# Patient Record
Sex: FEMALE | Race: WHITE | NOT HISPANIC OR LATINO | Employment: OTHER | URBAN - METROPOLITAN AREA
[De-identification: names, ages, dates, MRNs, and addresses within clinical notes are randomized per-mention and may not be internally consistent; named-entity substitution may affect disease eponyms.]

---

## 2017-04-18 DIAGNOSIS — I20.1 PRINZMETAL ANGINA (H): ICD-10-CM

## 2017-04-18 LAB
ALT SERPL W P-5'-P-CCNC: <5 U/L (ref 5–30)
CHOLEST SERPL-MCNC: 184 MG/DL
HDLC SERPL-MCNC: 75 MG/DL
LDLC SERPL CALC-MCNC: 85 MG/DL
NONHDLC SERPL-MCNC: 109 MG/DL
TRIGL SERPL-MCNC: 120 MG/DL

## 2017-04-18 PROCEDURE — 84460 ALANINE AMINO (ALT) (SGPT): CPT | Performed by: NURSE PRACTITIONER

## 2017-04-18 PROCEDURE — 36415 COLL VENOUS BLD VENIPUNCTURE: CPT | Performed by: NURSE PRACTITIONER

## 2017-04-18 PROCEDURE — 80061 LIPID PANEL: CPT | Performed by: NURSE PRACTITIONER

## 2017-04-24 ENCOUNTER — OFFICE VISIT (OUTPATIENT)
Dept: CARDIOLOGY | Facility: CLINIC | Age: 82
End: 2017-04-24
Attending: NURSE PRACTITIONER
Payer: MEDICARE

## 2017-04-24 VITALS
HEIGHT: 62 IN | BODY MASS INDEX: 23.41 KG/M2 | WEIGHT: 127.2 LBS | DIASTOLIC BLOOD PRESSURE: 52 MMHG | HEART RATE: 76 BPM | SYSTOLIC BLOOD PRESSURE: 136 MMHG

## 2017-04-24 DIAGNOSIS — I10 ESSENTIAL HYPERTENSION, BENIGN: Primary | ICD-10-CM

## 2017-04-24 DIAGNOSIS — E78.2 MIXED HYPERLIPIDEMIA: ICD-10-CM

## 2017-04-24 DIAGNOSIS — I47.10 PAROXYSMAL SUPRAVENTRICULAR TACHYCARDIA (H): ICD-10-CM

## 2017-04-24 DIAGNOSIS — I20.1 PRINZMETAL ANGINA (H): ICD-10-CM

## 2017-04-24 PROCEDURE — 99213 OFFICE O/P EST LOW 20 MIN: CPT | Performed by: INTERNAL MEDICINE

## 2017-04-24 RX ORDER — LOSARTAN POTASSIUM 25 MG/1
25 TABLET ORAL DAILY
COMMUNITY
End: 2017-08-04

## 2017-04-24 RX ORDER — NITROGLYCERIN 0.4 MG/1
TABLET SUBLINGUAL
Qty: 25 TABLET | Refills: 3 | Status: SHIPPED | OUTPATIENT
Start: 2017-04-24 | End: 2017-08-04

## 2017-04-24 RX ORDER — NITROGLYCERIN 0.4 MG/1
0.4 TABLET SUBLINGUAL EVERY 5 MIN PRN
COMMUNITY
End: 2019-06-20

## 2017-04-24 RX ORDER — DILTIAZEM HCL 60 MG
60 TABLET ORAL PRN
Qty: 10 TABLET | Refills: 2 | Status: SHIPPED | OUTPATIENT
Start: 2017-04-24 | End: 2019-07-30

## 2017-04-24 RX ORDER — ESTRADIOL 0.1 MG/G
2 CREAM VAGINAL WEEKLY
COMMUNITY
End: 2018-06-18

## 2017-04-24 NOTE — MR AVS SNAPSHOT
After Visit Summary   4/24/2017    Ralph Whitten    MRN: [id number]           Patient Information     Date Of Birth          10/16/1930        Visit Information        Provider Department      4/24/2017 3:45 PM Nnamdi De Jesus MD [facility]        Today's Diagnoses     Essential hypertension, benign    -  1    Prinzmetal angina (H)        Mixed hyperlipidemia        Paroxysmal supraventricular tachycardia (H)           Follow-ups after your visit        Additional Services     Follow-Up with Cardiologist                 Future tests that were ordered for you today     Open Future Orders        Priority Expected Expires Ordered    EKG 12-lead complete w/read - Clinics (to be scheduled) Routine 4/24/2018 5/29/2018 4/24/2017    Basic metabolic panel Routine 4/24/2018 5/29/2018 4/24/2017    Lipid Profile Routine 4/24/2018 5/29/2018 4/24/2017    ALT Routine 4/24/2018 5/29/2018 4/24/2017    Follow-Up with Cardiologist Routine 4/24/2018 9/6/2018 4/24/2017            Who to contact     If you have questions or need follow up information about today's clinic visit or your schedule please contact [facility] directly at [phone].  Normal or non-critical lab and imaging results will be communicated to you by MyChart, letter or phone within 4 business days after the clinic has received the results. If you do not hear from us within 7 days, please contact the clinic through [employer] or phone. If you have a critical or abnormal lab result, we will notify you by phone as soon as possible.  Submit refill requests through [employer] or call your pharmacy and they will forward the refill request to us. Please allow 3 business days for your refill to be completed.          Additional Information About Your Visit        MyChart Information     [employer] lets you send messages to your doctor, view your test results, renew your  "prescriptions, schedule appointments and more. To sign up, go to [email or web address] . Click on \"Log in\" on the left side of the screen, which will take you to the Welcome page. Then click on \"Sign up Now\" on the right side of the page.     You will be asked to enter the access code listed below, as well as some personal information. Please follow the directions to create your username and password.     Your access code is: [email or web address]  Expires: 2017  4:26 PM     Your access code will  in 90 days. If you need help or a new code, please call your [place] clinic or [phone].        Care EveryWhere ID     This is your Care EveryWhere ID. This could be used by other organizations to access your [place] medical records  [id number]        Your Vitals Were     Pulse Height BMI (Body Mass Index)             76 1.562 m (5' 1.5\") 23.65 kg/m2          Blood Pressure from Last 3 Encounters:   17 136/52   16 120/58   16 122/60    Weight from Last 3 Encounters:   17 57.7 kg (127 lb 3.2 oz)   16 60.8 kg (134 lb)   16 59.4 kg (131 lb)              We Performed the Following     Follow-Up with Cardiologist          Today's Medication Changes          These changes are accurate as of: 17  4:26 PM.  If you have any questions, ask your nurse or doctor.               These medicines have changed or have updated prescriptions.        Dose/Directions    * nitroglycerin 0.4 MG sublingual tablet   Commonly known as:  NITROSTAT   This may have changed:  Another medication with the same name was added. Make sure you understand how and when to take each.   Changed by:  Nnamdi De Jesus MD        Dose:  0.4 mg   Place 0.4 mg under the tongue every 5 minutes as needed for chest pain For chest pain place 1 tablet under the tongue every 5 minutes for 3 doses. If symptoms persist 5 minutes after 1st dose call 911.   Refills:  0       * nitroglycerin 0.4 MG sublingual tablet "   Commonly known as:  NITROSTAT   This may have changed:  You were already taking a medication with the same name, and this prescription was added. Make sure you understand how and when to take each.   Used for:  Prinzmetal angina (H)   Changed by:  Nnamdi De Jesus MD        For chest pain place 1 tablet under the tongue every 5 minutes for 3 doses. If symptoms persist 5 minutes after 1st dose call 911.   Quantity:  25 tablet   Refills:  3       * Notice:  This list has 2 medication(s) that are the same as other medications prescribed for you. Read the directions carefully, and ask your doctor or other care provider to review them with you.         Where to get your medicines      These medications were sent to [facility] [address] [place] [address] [facility]  [address] [place] [id number]     Phone:  [phone]     diltiazem 60 MG tablet    nitroglycerin 0.4 MG sublingual tablet                Primary Care Provider Office Phone # Fax #    Ramin Garvey [phone]       [facility] [address] [facility] [address]        Thank you!     Thank you for choosing [facility] [place]  for your care. Our goal is always to provide you with excellent care. Hearing back from our patients is one way we can continue to improve our services. Please take a few minutes to complete the written survey that you may receive in the mail after your visit with us. Thank you!             Your Updated Medication List - Protect others around you: Learn how to safely use, store and throw away your medicines at www.disposemymeds.org.          This list is accurate as of: 4/24/17  4:26 PM.  Always use your most recent med list.                   Brand Name Dispense Instructions for use    aspirin 81 MG tablet      Take 81 mg by mouth daily       DAILY MULTIVITAMIN PO      Take by mouth daily       * diltiazem 240 MG 24 hr capsule       Take 240 mg by mouth daily       * diltiazem 60 MG tablet    CARDIZEM    10 tablet    Take 1 tablet (60 mg) by mouth as needed Take 1 tablet only as needed for increased palpitatons       ESTRACE VAGINAL 0.1 MG/GM cream   Generic drug:  estradiol      Place 2 g vaginally once a week       LORAZEPAM PO      Take 0.5 mg by mouth daily as needed       losartan 25 MG tablet    COZAAR     Take 25 mg by mouth daily       metFORMIN 500 MG tablet    GLUCOPHAGE     Take 500 mg by mouth 2 times daily (with meals)       * nitroglycerin 0.4 MG sublingual tablet    NITROSTAT     Place 0.4 mg under the tongue every 5 minutes as needed for chest pain For chest pain place 1 tablet under the tongue every 5 minutes for 3 doses. If symptoms persist 5 minutes after 1st dose call 911.       * nitroglycerin 0.4 MG sublingual tablet    NITROSTAT    25 tablet    For chest pain place 1 tablet under the tongue every 5 minutes for 3 doses. If symptoms persist 5 minutes after 1st dose call 911.       OMEPRAZOLE PO      Take 20 mg by mouth       pravastatin 40 MG tablet    PRAVACHOL    90 tablet    Take 1 tablet (40 mg) by mouth At Bedtime       PREMARIN 0.3 MG tablet   Generic drug:  estrogens (conjugated)      Take 0.3 mg by mouth daily       PROBIOTIC ADVANCED Caps      Take by mouth daily       sertraline 50 MG tablet    ZOLOFT     Take 50 mg by mouth daily       SYNTHROID 75 MCG tablet   Generic drug:  levothyroxine      Take 75 mcg by mouth daily       VITAMIN D3 PO      Take by mouth daily       * Notice:  This list has 4 medication(s) that are the same as other medications prescribed for you. Read the directions carefully, and ask your doctor or other care provider to review them with you.

## 2017-04-24 NOTE — PROGRESS NOTES
HPI and Plan:   See dictation    Orders Placed This Encounter   Procedures     Basic metabolic panel     Lipid Profile     ALT     Follow-Up with Cardiologist     EKG 12-lead complete w/read - Clinics (to be scheduled)     Orders Placed This Encounter   Medications     estrogens, conjugated, (PREMARIN) 0.3 MG tablet     Sig: Take 0.3 mg by mouth daily     estradiol (ESTRACE VAGINAL) 0.1 MG/GM cream     Sig: Place 2 g vaginally once a week     sertraline (ZOLOFT) 50 MG tablet     Sig: Take 50 mg by mouth daily     Probiotic Product (PROBIOTIC ADVANCED) CAPS     Sig: Take by mouth daily     nitroglycerin (NITROSTAT) 0.4 MG sublingual tablet     Sig: Place 0.4 mg under the tongue every 5 minutes as needed for chest pain For chest pain place 1 tablet under the tongue every 5 minutes for 3 doses. If symptoms persist 5 minutes after 1st dose call 911.     losartan (COZAAR) 25 MG tablet     Sig: Take 25 mg by mouth daily     nitroglycerin (NITROSTAT) 0.4 MG sublingual tablet     Sig: For chest pain place 1 tablet under the tongue every 5 minutes for 3 doses. If symptoms persist 5 minutes after 1st dose call 911.     Dispense:  25 tablet     Refill:  3     diltiazem (CARDIZEM) 60 MG tablet     Sig: Take 1 tablet (60 mg) by mouth as needed Take 1 tablet only as needed for increased palpitatons     Dispense:  10 tablet     Refill:  2     Medications Discontinued During This Encounter   Medication Reason     calcium-vitamin D 500-125 MG-UNIT TABS Medication Reconciliation Clean Up     losartan-hydrochlorothiazide (HYZAAR) 50-12.5 MG per tablet Medication Reconciliation Clean Up     NITROGLYCERIN SL Medication Reconciliation Clean Up     diltiazem (CARDIZEM) 60 MG tablet Reorder         Encounter Diagnoses   Name Primary?     Prinzmetal angina (H)      Essential hypertension, benign Yes     Mixed hyperlipidemia      Paroxysmal supraventricular tachycardia (H)        CURRENT MEDICATIONS:  Current Outpatient Prescriptions    Medication Sig Dispense Refill     estrogens, conjugated, (PREMARIN) 0.3 MG tablet Take 0.3 mg by mouth daily       estradiol (ESTRACE VAGINAL) 0.1 MG/GM cream Place 2 g vaginally once a week       sertraline (ZOLOFT) 50 MG tablet Take 50 mg by mouth daily       Probiotic Product (PROBIOTIC ADVANCED) CAPS Take by mouth daily       nitroglycerin (NITROSTAT) 0.4 MG sublingual tablet Place 0.4 mg under the tongue every 5 minutes as needed for chest pain For chest pain place 1 tablet under the tongue every 5 minutes for 3 doses. If symptoms persist 5 minutes after 1st dose call 911.       losartan (COZAAR) 25 MG tablet Take 25 mg by mouth daily       nitroglycerin (NITROSTAT) 0.4 MG sublingual tablet For chest pain place 1 tablet under the tongue every 5 minutes for 3 doses. If symptoms persist 5 minutes after 1st dose call 911. 25 tablet 3     diltiazem (CARDIZEM) 60 MG tablet Take 1 tablet (60 mg) by mouth as needed Take 1 tablet only as needed for increased palpitatons 10 tablet 2     pravastatin (PRAVACHOL) 40 MG tablet Take 1 tablet (40 mg) by mouth At Bedtime 90 tablet 3     diltiazem 240 MG 24 hr ER capsule Take 240 mg by mouth daily       Multiple Vitamin (DAILY MULTIVITAMIN PO) Take by mouth daily        OMEPRAZOLE PO Take 20 mg by mouth       levothyroxine (SYNTHROID) 75 MCG tablet Take 75 mcg by mouth daily       Cholecalciferol (VITAMIN D3 PO) Take by mouth daily       aspirin 81 MG tablet Take 81 mg by mouth daily       LORAZEPAM PO Take 0.5 mg by mouth daily as needed        metFORMIN (GLUCOPHAGE) 500 MG tablet Take 500 mg by mouth 2 times daily (with meals)       [DISCONTINUED] NITROGLYCERIN SL Place 0.4 mg under the tongue       [DISCONTINUED] diltiazem (CARDIZEM) 60 MG tablet Take 1 tablet (60 mg) by mouth as needed Take 1 tablet only as needed for increased palpitatons 10 tablet 2       ALLERGIES     Allergies   Allergen Reactions     Diovan [Valsartan] Other (See Comments) and GI Disturbance      Arthralgia     Hmg-Coa-R Inhibitors Other (See Comments)     Statin Medications give patient Myalgias---simvastatin     Sulfa Drugs Rash       PAST MEDICAL HISTORY:  Past Medical History:   Diagnosis Date     Degenerative disk disease     C 6/7     Diabetes mellitus (H)      Diabetic neuropathy (H)      Essential hypertension, benign      Generalized osteoarthrosis, unspecified site (aka ARTHRITIS)     osteoporosis and osteoarthritis     Hyperlipidemia      Hypothyroid      Mitral valve disorder     mild/mod     PAT (paroxysmal atrial tachycardia) (H)      Prinzmetal angina (H)     no cad 2000     PUD (peptic ulcer disease)      Rheumatic fever      Tricuspid regurgitation     mod     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       PAST SURGICAL HISTORY:  Past Surgical History:   Procedure Laterality Date     APPENDECTOMY       C RAD RESEC TONSIL/PILLARS       CORONARY ANGIOGRAPHY ADULT ORDER  2002    normal Coronary arteries, Vasospasms     HYSTERECTOMY      total     rotator cuff surgery      right     TONSILLECTOMY         FAMILY HISTORY:  Family History   Problem Relation Age of Onset     Coronary Artery Disease Mother      Myocardial Infarction Mother      Heart Failure Father      Unknown/Adopted Maternal Grandmother      Unknown/Adopted Maternal Grandfather      Unknown/Adopted Paternal Grandmother      Unknown/Adopted Paternal Grandfather        SOCIAL HISTORY:  Social History     Social History     Marital status:      Spouse name: N/A     Number of children: N/A     Years of education: N/A     Social History Main Topics     Smoking status: Never Smoker     Smokeless tobacco: None     Alcohol use Yes      Comment: occ. wine     Drug use: None     Sexual activity: Not Asked     Other Topics Concern     Caffeine Concern No     coffee: 3-4 cups a day     Sleep Concern No     Stress Concern No     Weight Concern No     Special Diet Yes     diabetic diet, healthy      "Exercise Yes     walking x3 a week     Seat Belt Yes     Social History Narrative       Review of Systems:  Skin:  Negative     Eyes:  Positive for glasses  ENT:  Negative    Respiratory:  Negative    Cardiovascular:    Positive for;palpitations;chest pain;dizziness;lightheadedness;edema  Gastroenterology:      Genitourinary:  not assessed    Musculoskeletal:  Positive for joint pain  Neurologic:  Negative    Psychiatric:  Negative    Heme/Lymph/Imm:  Positive for allergies  Endocrine:  Positive for thyroid disorder;diabetes    Physical Exam:  Vitals: /52  Pulse 76  Ht 1.562 m (5' 1.5\")  Wt 57.7 kg (127 lb 3.2 oz)  BMI 23.65 kg/m2    Constitutional:  cooperative, alert and oriented, well developed, well nourished, in no acute distress        Skin:  warm and dry to the touch, no apparent skin lesions or masses noted        Head:  normocephalic, no masses or lesions        Eyes:  pupils equal and round, conjunctivae and lids unremarkable, sclera white, no xanthalasma, EOMS intact, no nystagmus        ENT:  no pallor or cyanosis, dentition good        Neck:  carotid pulses are full and equal bilaterally, JVP normal, no carotid bruit, no thyromegaly        Chest:  normal breath sounds, clear to auscultation, normal A-P diameter, normal symmetry, normal respiratory excursion, no use of accessory muscles          Cardiac: regular rhythm, normal S1/S2, no S3 or S4, apical impulse not displaced, no murmurs, gallops or rubs                  Abdomen:  abdomen soft, non-tender, BS normoactive, no mass, no HSM, no bruits        Vascular: pulses full and equal, no bruits auscultated                                        Extremities and Back:  no deformities, clubbing, cyanosis, erythema observed              Neurological:  affect appropriate, oriented to time, person and place          Recent Lab Results:  LIPID RESULTS:  Lab Results   Component Value Date    CHOL 184 04/18/2017    HDL 75 04/18/2017    LDL 85 " 04/18/2017    TRIG 120 04/18/2017    CHOLHDLRATIO 3.1 01/12/2011       LIVER ENZYME RESULTS:  Lab Results   Component Value Date    AST 23 04/22/2016    ALT <5 (L) 04/18/2017       CBC RESULTS:  Lab Results   Component Value Date    WBC 7.0 03/31/2015    RBC 3.97 03/31/2015    HGB 12.1 03/31/2015    HCT 36.7 03/31/2015    MCV 92 03/31/2015    MCH 30.5 03/31/2015    MCHC 33 03/31/2015    RDW 13.2 03/31/2015     03/31/2015       BMP RESULTS:  Lab Results   Component Value Date     03/31/2015    POTASSIUM 4.6 03/31/2015    CHLORIDE 105 03/31/2015    CO2 24 01/12/2011    ANIONGAP 11 03/31/2015     (A) 03/31/2015    BUN 23 03/31/2015    CR 1.13 03/31/2015    GFRESTBLACK 56 03/31/2015    CHUN 9.9 03/31/2015        A1C RESULTS:  Lab Results   Component Value Date    A1C 6.9 (A) 04/22/2016       INR RESULTS:  No results found for: INR        CC  Gwyn Camara, APRN CNP   [facility]  [address]  [place] [id number]

## 2017-04-24 NOTE — LETTER
4/24/2017    Ramin Garvey  [facility]   [address]  [facility] [address]    RE: Ralph Whitten       Dear Colleague,    I had the pleasure of seeing Ralph Whitten in the [facility].    I had the pleasure of following up on our mutual patient, Ralph Whitten.  She is a delightful 86-year-old woman who looks and acts much younger than stated age.  She has heart catheterization confirmed Printzmetal's angina.  She also has a brief history of PAT.  She reports that the PAT is not a problem but she has increased isolated flip-flops since she started sertraline for depression and anxiety from her 's recent medical condition.  I told the patient it is unusual for palpitations to be part of the SSRI drugs as opposed to other drugs such as Wellbutrin but indeed it is on the list and I told her basically if she is doing fine I would not do anything different.  She had a prescription for p.r.n. 60 mg of diltiazem for palpitation and she is welcome to use that on top of her standard long-acting diltiazem.  I do not know if it is a continued problem with a different member of the SSRI family would be useful.  I would not use Wellbutrin.  I would not use SNRI drugs, however.  I did tell her that that drug should not be stopped abruptly since there is a withdrawal syndrome of dizziness.  I also told her if it becomes more of a problem, we may have her wear a 30-day heart monitor just to make sure that we were not dealing with a secondary problem such as atrial fibrillation, etc.        With regard to her Printzmetal she had been quiescent for a very long time but she states that she had 1 particularly bad episode where she woke up at 5:00 in the morning with very significant chest tightness and throat tightness.  It went away before she even needed to take a nitroglycerin.  Again, I am going to assume its Printzmetal angina but I told her esophageal spasm could feel similar.  We  last looked at her arteries in 2009 with a stress test which was negative for ischemia and the heart catheterization in 2000 showed crystal clear arteries but only spasm.  I gave her instructions on how to use sublingual nitroglycerin and when to call the ambulance if it does not go away.        Blood work was reviewed today.  Her ALT was 5, total cholesterol 184, HDL 75, LDL 85.  Our goal is to keep it less than 70 and that would be ideal, but again she did not have fixed coronary disease.  Triglycerides were 120.  I thought those numbers were quite satisfactory.        Outpatient Encounter Prescriptions as of 4/24/2017   Medication Sig Dispense Refill     estrogens, conjugated, (PREMARIN) 0.3 MG tablet Take 0.3 mg by mouth daily       estradiol (ESTRACE VAGINAL) 0.1 MG/GM cream Place 2 g vaginally once a week       Probiotic Product (PROBIOTIC ADVANCED) CAPS Take by mouth daily       nitroglycerin (NITROSTAT) 0.4 MG sublingual tablet Place 0.4 mg under the tongue every 5 minutes as needed for chest pain For chest pain place 1 tablet under the tongue every 5 minutes for 3 doses. If symptoms persist 5 minutes after 1st dose call 911.       losartan (COZAAR) 25 MG tablet Take 25 mg by mouth daily       nitroglycerin (NITROSTAT) 0.4 MG sublingual tablet For chest pain place 1 tablet under the tongue every 5 minutes for 3 doses. If symptoms persist 5 minutes after 1st dose call 911. 25 tablet 3     diltiazem (CARDIZEM) 60 MG tablet Take 1 tablet (60 mg) by mouth as needed Take 1 tablet only as needed for increased palpitatons (Patient taking differently: Take 60 mg by mouth as needed ) 10 tablet 2     [DISCONTINUED] sertraline (ZOLOFT) 50 MG tablet Take 50 mg by mouth daily       pravastatin (PRAVACHOL) 40 MG tablet Take 1 tablet (40 mg) by mouth At Bedtime 90 tablet 3     diltiazem 240 MG 24 hr ER capsule Take 240 mg by mouth daily       Multiple Vitamin (DAILY MULTIVITAMIN PO) Take by mouth daily        OMEPRAZOLE PO  Take 20 mg by mouth       levothyroxine (SYNTHROID) 75 MCG tablet Take 75 mcg by mouth daily       Cholecalciferol (VITAMIN D3 PO) Take by mouth daily       aspirin 81 MG tablet Take 81 mg by mouth daily       LORAZEPAM PO Take 0.5 mg by mouth daily as needed        metFORMIN (GLUCOPHAGE) 500 MG tablet Take 500 mg by mouth 2 times daily (with meals)       [DISCONTINUED] losartan-hydrochlorothiazide (HYZAAR) 50-12.5 MG per tablet Take 1 tablet by mouth daily       [DISCONTINUED] NITROGLYCERIN SL Place 0.4 mg under the tongue       [DISCONTINUED] calcium-vitamin D 500-125 MG-UNIT TABS Take by mouth daily        [DISCONTINUED] diltiazem (CARDIZEM) 60 MG tablet Take 1 tablet (60 mg) by mouth as needed Take 1 tablet only as needed for increased palpitatons 10 tablet 2     No facility-administered encounter medications on file as of 4/24/2017.      At this juncture then I have made no additional changes.  I did review with her, again as I mentioned, when to take diltiazem, when to take nitroglycerin and when to call the ambulance.      This was a 30-minute visit; greater than 50% counseling.     Again, thank you for allowing me to participate in the care of your patient.      Sincerely,    Nnamdi De Jesus MD     [facility]

## 2017-04-25 NOTE — PROGRESS NOTES
HISTORY OF PRESENT ILLNESS:  I had the pleasure of following up on our mutual patient, Ralph Whitten.  She is a delightful 86-year-old woman who looks and acts much younger than stated age.  She has heart catheterization confirmed Printzmetal's angina.  She also has a brief history of PAT.  She reports that the PAT is not a problem but she has increased isolated flip-flops since she started sertraline for depression and anxiety from her 's recent medical condition.  I told the patient it is unusual for palpitations to be part of the SSRI drugs as opposed to other drugs such as Wellbutrin but indeed it is on the list and I told her basically if she is doing fine I would not do anything different.  She had a prescription for p.r.n. 60 mg of diltiazem for palpitation and she is welcome to use that on top of her standard long-acting diltiazem.  I do not know if it is a continued problem with a different member of the SSRI family would be useful.  I would not use Wellbutrin.  I would not use SNRI drugs, however.  I did tell her that that drug should not be stopped abruptly since there is a withdrawal syndrome of dizziness.  I also told her if it becomes more of a problem, we may have her wear a 30-day heart monitor just to make sure that we were not dealing with a secondary problem such as atrial fibrillation, etc.        With regard to her Printzmetal she had been quiescent for a very long time but she states that she had 1 particularly bad episode where she woke up at 5:00 in the morning with very significant chest tightness and throat tightness.  It went away before she even needed to take a nitroglycerin.  Again, I am going to assume its Printzmetal angina but I told her esophageal spasm could feel similar.  We last looked at her arteries in 2009 with a stress test which was negative for ischemia and the heart catheterization in 2000 showed crystal clear arteries but only spasm.  I gave her instructions on how  to use sublingual nitroglycerin and when to call the ambulance if it does not go away.        Blood work was reviewed today.  Her ALT was 5, total cholesterol 184, HDL 75, LDL 85.  Our goal is to keep it less than 70 and that would be ideal, but again she did not have fixed coronary disease.  Triglycerides were 120.  I thought those numbers were quite satisfactory.        At this juncture then I have made no additional changes.  I did review with her, again as I mentioned, when to take diltiazem, when to take nitroglycerin and when to call the ambulance.      This was a 30-minute visit; greater than 50% counseling.      Nnamdi Hannon MD      cc:   [place] [address]         NNAMDI HANNON MD             D: 2017 16:26   T: 2017 07:21   MT: [email or web address]      Name:     PARADISE TIWARI   MRN:      [id number]        Account:      [id number]   :      10/16/1930           Service Date: 2017      Document: [id number]

## 2017-05-18 ENCOUNTER — TELEPHONE (OUTPATIENT)
Dept: CARDIOLOGY | Facility: CLINIC | Age: 82
End: 2017-05-18

## 2017-05-18 NOTE — TELEPHONE ENCOUNTER
Call from PMD's office asking about Pt. She was in there today stating she had arm pain and CP, and it lasted one evening for 20 minutes.  Asked if Pt used nitro or diltiazem and RN was not sure. Went over Pt's Hx with PMD's  RN, and reviewed chart and Dr De Jesus last note he had just seen Pt at end of April. Offered to call Pt and RN said she would first speak with provider and call back. ISAAC Martinez RN

## 2017-05-18 NOTE — TELEPHONE ENCOUNTER
Call out to Pt to discuss angina. Left message with  to call clinic. Pt return call and did go over pain. Pt says yesterday she was in kitchen getting supper ready, and had chest pressure and left arm pain. Pt says pain in arm was 7 out of 0-10 scale.  Pt says pain lasted for 15-20 minutes and she sat down to rest until it went away. Pt did not use any nitro. Pt denies any pain at present or today. Went over use of nitro with Pt if she again has pain and going to ER if pain does not resolve with use of nitro up to 3 tablets .   Informed Pt she can see DR De Jesus 5/23/17 at 245 and she agreed to appointment. ISAAC Martinez RN

## 2017-05-23 ENCOUNTER — OFFICE VISIT (OUTPATIENT)
Dept: CARDIOLOGY | Facility: CLINIC | Age: 82
End: 2017-05-23
Payer: MEDICARE

## 2017-05-23 VITALS
WEIGHT: 127.9 LBS | SYSTOLIC BLOOD PRESSURE: 134 MMHG | BODY MASS INDEX: 23.53 KG/M2 | DIASTOLIC BLOOD PRESSURE: 52 MMHG | HEART RATE: 76 BPM | HEIGHT: 62 IN

## 2017-05-23 DIAGNOSIS — I47.10 PAROXYSMAL SUPRAVENTRICULAR TACHYCARDIA (H): ICD-10-CM

## 2017-05-23 DIAGNOSIS — I20.0 UNSTABLE ANGINA (H): Primary | ICD-10-CM

## 2017-05-23 PROCEDURE — 99215 OFFICE O/P EST HI 40 MIN: CPT | Performed by: INTERNAL MEDICINE

## 2017-05-23 RX ORDER — METOPROLOL SUCCINATE 25 MG/1
25 TABLET, EXTENDED RELEASE ORAL AT BEDTIME
Qty: 1 TABLET | Refills: 0 | Status: SHIPPED | OUTPATIENT
Start: 2017-05-23 | End: 2018-06-18

## 2017-05-23 RX ORDER — CITALOPRAM HYDROBROMIDE 10 MG/1
10 TABLET ORAL DAILY
COMMUNITY

## 2017-05-23 NOTE — LETTER
"5/23/2017    Ramin Garvey  [facility]   [address]  [facility] [address]    RE: Ralph Whitten       Dear Colleague,    I had the pleasure of seeing Ralph Whitten in the [facility].    I had the pleasure of following up on our mutual patient, Ralph Whitten.  As you know, she is an 86-year-old woman who looks and acts much younger.  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  She had a stress test in 2009 that was negative for ischemia.  Her last echo was in 2016 that showed preserved LV and RV function, mild MR and moderate TR.  The patient also has a history of PAT.  She was placed on diltiazem, both for coronary spasm and her PAT.  She called our office because for the last several weeks, particularly the last week or two, she has noticed left shoulder pain.  It comes on out of the blue, not related to arm motion, not related to exercise.  Usually the episodes lasted for 15-20 minutes.  She called our office.  We instructed her to try nitroglycerin the next day, and she used nitroglycerin and stated the pain went away promptly.  She will also get a nonspecific \"sick\" feeling in her chest we are not sure if that also means that she was having palpitations or not.  She has no dizziness, no syncope.  She does have a history of right rotator cuff surgery, but this is the left shoulder that is bothering her now.      At this juncture, I am going to recommend the following.  Although the story is moderately suspicious for angina, it does not tell me if it is vasospastic angina or new fixed disease which was not present years ago.  I am going to continue the medicines that she is on, and I told her to feel free to use the sublingual nitroglycerin.  We are going to order a CT coronary angiogram to determine if she has new fixed blockage of her coronary arteries.  If it is not covered by the insurance company, we could try a Lexiscan, " although she did not want to do that again.  I am not suspicious enough to go to a standard angiogram quite yet.  If the CT coronary angiogram is negative, then the decision is the left shoulder pain Prinzmetal angina which we are going to treat with escalating doses of nitrates and diltiazem or is it simply an orthopedic problem and I will ask you to weigh in on that if you could.  I am also going to have her wear a 30-day cardiac event monitor just to make sure it is not PAT that she is feeling since she states there was a nonspecific fullness or fluttering in her chest at the same time and she did have a history of PAT in the past.  We will probably want to do a followup echocardiogram next year to follow the tricuspid mitral valve insufficiency, but I do not think that is weighing in.  Lastly, I should comment that apparently she was just started on Celexa about 3 weeks ago.  We are not certain if that is connected or not, but the timing of the Celexa is similar to the timing of the shoulder pain.  Generally, the SSRI medicines do not have any cardiovascular effects, so I do not think that is likely an issue.  Thank you for allowing me to see Ms. Ralph Whitten.      This was a 40 minute visit; greater than 50% counseling.     Again, thank you for allowing me to participate in the care of your patient.      Sincerely,    Nnamdi De Jesus MD     [facility]

## 2017-05-23 NOTE — MR AVS SNAPSHOT
After Visit Summary   5/23/2017    Ralph Whitten    MRN: [id number]           Patient Information     Date Of Birth          10/16/1930        Visit Information        Provider Department      5/23/2017 2:45 PM Nnamdi De Jesus MD [facility] AT [place]        Today's Diagnoses     Unstable angina (H)    -  1    Paroxysmal supraventricular tachycardia (H)           Follow-ups after your visit        Additional Services     Follow-Up with Cardiac Advanced Practice Provider       Or itz or any np  After ct cor angiogram NOTE-IF CANT DO CT COR ANGIOGRAM THEN ORDER LEXISCAN NUC GXT                  Your next 10 appointments already scheduled     May 24, 2017  1:00 PM CDT   CTA ANGIOGRAM CORONARY ARTERY with SCICT1   [facility] (Cardiovascular Imaging at [facility])    [address] [place] [address]  [address]  [facility] [address] [phone]           Please allow two hours for this test.  Follow the instructions below:   The day before your exam, drink extra fluids at least six 8-ounce glasses (unless your doctor wants you to restrict your fluids).   No caffeine and no smoking the day of the test.   Do not eat or drink for 3 hours before your exam. You may take your morning medicines with small sips of water.   You may have or need a blood test (creatinine test) within 30 days of your exam. Go to your clinic or Diagnostic Imaging Department for this test.   If you take Viagra, Levitra or Cialis, stop taking it for 48 hours before your test.   If you are diabetic and take oral hypoglycemics, do not take them on the day of your test. Also, wait 48 hours before re-starting metformin (Avandamet, Glucophage, Glucovance, Metaglip).   Do not take diuretics on day of the test. This includes Furosemide (Lasix), Torsemide, Bumetanide (Bumex), Metolazone (Zaroxolyn) and Hydrochlorothiazide.   Do not take NSAIDS on the day of the test. This includes  ibuprofen (Advil or Motrin), Naproxen and Indomethacin.  Bring any scans or X-rays taken at other hospitals, if similar tests were done. Also bring a list of your medicines, including vitamins, minerals and over-the-counter drugs. It is safest to leave personal items at home.  Be sure to tell your doctor:   If you have any allergies, including any reaction to contrast.   If there s any chance you are pregnant.   If you are breastfeeding.   If you have any special needs.  Please wear loose clothing, such as a sweat suit or jogging clothes. Avoid snaps, zippers and other metal. We may ask you to undress and put on a hospital gown.  If you have any questions, please call the Imaging Department where you will have your exam.            May 24, 2017  2:00 PM CDT   Event Monitor with BRAD   [facility] ([facility])    [address]  [place] [address] [phone]              Future tests that were ordered for you today     Open Future Orders        Priority Expected Expires Ordered    Cardiac Event Monitor - Peds/Adult Routine 5/30/2017 5/23/2018 5/23/2017    CT Angiogram coronary artery Routine 5/30/2017 5/23/2018 5/23/2017    Follow-Up with Cardiac Advanced Practice Provider Routine 5/30/2017 5/23/2018 5/23/2017            Who to contact     If you have questions or need follow up information about today's clinic visit or your schedule please contact [facility] PHYS[facility] AT [place] directly at [phone].  Normal or non-critical lab and imaging results will be communicated to you by MyChart, letter or phone within 4 business days after the clinic has received the results. If you do not hear from us within 7 days, please contact the clinic through [employer] or phone. If you have a critical or abnormal lab result, we will notify you by phone as soon as possible.  Submit refill requests through [employer] or call your pharmacy and  "they will forward the refill request to us. Please allow 3 business days for your refill to be completed.          Additional Information About Your Visit        [employer] Information     [employer] lets you send messages to your doctor, view your test results, renew your prescriptions, schedule appointments and more. To sign up, go to [email or web address] . Click on \"Log in\" on the left side of the screen, which will take you to the Welcome page. Then click on \"Sign up Now\" on the right side of the page.     You will be asked to enter the access code listed below, as well as some personal information. Please follow the directions to create your username and password.     Your access code is: [email or web address]  Expires: 2017  4:26 PM     Your access code will  in 90 days. If you need help or a new code, please call your [place] clinic or [phone].        Care EveryWhere ID     This is your [facility] EveryWhere ID. This could be used by other organizations to access your [place] medical records  [id number]        Your Vitals Were     Pulse Height BMI (Body Mass Index)             76 1.562 m (5' 1.5\") 23.78 kg/m2          Blood Pressure from Last 3 Encounters:   17 134/52   17 136/52   16 120/58    Weight from Last 3 Encounters:   17 58 kg (127 lb 14.4 oz)   17 57.7 kg (127 lb 3.2 oz)   16 60.8 kg (134 lb)              We Performed the Following     EKG 12-lead complete w/read - Clinics (to be scheduled)          Today's Medication Changes          These changes are accurate as of: 17  3:37 PM.  If you have any questions, ask your nurse or doctor.               Start taking these medicines.        Dose/Directions    metoprolol 25 MG 24 hr tablet   Commonly known as:  TOPROL-XL   Used for:  Unstable angina (H)   Started by:  Nnamdi De Jesus MD        Dose:  25 mg   Take 1 tablet (25 mg) by mouth At Bedtime for 1 dose   Quantity:  1 tablet   Refills:  0         Stop " taking these medicines if you haven't already. Please contact your care team if you have questions.     sertraline 50 MG tablet   Commonly known as:  ZOLOFT   Stopped by:  Nnamdi De Jesus MD                Where to get your medicines      These medications were sent to [facility] [address] [place]  [address] [facility] [address]     Phone:  [phone]     metoprolol 25 MG 24 hr tablet                Primary Care Provider Office Phone # Fax #    Ramin Garvey [phone]       [facility] [address] [facility] [address]        Thank you!     Thank you for choosing [facility] AT [place]  for your care. Our goal is always to provide you with excellent care. Hearing back from our patients is one way we can continue to improve our services. Please take a few minutes to complete the written survey that you may receive in the mail after your visit with us. Thank you!             Your Updated Medication List - Protect others around you: Learn how to safely use, store and throw away your medicines at www.disposemymeds.org.          This list is accurate as of: 5/23/17  3:37 PM.  Always use your most recent med list.                   Brand Name Dispense Instructions for use    aspirin 81 MG tablet      Take 81 mg by mouth daily       celeXA 10 MG tablet   Generic drug:  citalopram      Take 10 mg by mouth daily       DAILY MULTIVITAMIN PO      Take by mouth daily       * diltiazem 240 MG 24 hr capsule      Take 240 mg by mouth daily       * diltiazem 60 MG tablet    CARDIZEM    10 tablet    Take 1 tablet (60 mg) by mouth as needed Take 1 tablet only as needed for increased palpitatons       ESTRACE VAGINAL 0.1 MG/GM cream   Generic drug:  estradiol      Place 2 g vaginally once a week       LORAZEPAM PO      Take 0.5 mg by mouth daily as needed       losartan 25 MG tablet    COZAAR     Take 25 mg by  mouth daily       metFORMIN 500 MG tablet    GLUCOPHAGE     Take 500 mg by mouth 2 times daily (with meals)       metoprolol 25 MG 24 hr tablet    TOPROL-XL    1 tablet    Take 1 tablet (25 mg) by mouth At Bedtime for 1 dose       * nitroglycerin 0.4 MG sublingual tablet    NITROSTAT     Place 0.4 mg under the tongue every 5 minutes as needed for chest pain For chest pain place 1 tablet under the tongue every 5 minutes for 3 doses. If symptoms persist 5 minutes after 1st dose call 911.       * nitroglycerin 0.4 MG sublingual tablet    NITROSTAT    25 tablet    For chest pain place 1 tablet under the tongue every 5 minutes for 3 doses. If symptoms persist 5 minutes after 1st dose call 911.       OMEPRAZOLE PO      Take 20 mg by mouth       pravastatin 40 MG tablet    PRAVACHOL    90 tablet    Take 1 tablet (40 mg) by mouth At Bedtime       PREMARIN 0.3 MG tablet   Generic drug:  estrogens (conjugated)      Take 0.3 mg by mouth daily       PROBIOTIC ADVANCED Caps      Take by mouth daily       SYNTHROID 75 MCG tablet   Generic drug:  levothyroxine      Take 75 mcg by mouth daily       VITAMIN D3 PO      Take by mouth daily       * Notice:  This list has 4 medication(s) that are the same as other medications prescribed for you. Read the directions carefully, and ask your doctor or other care provider to review them with you.

## 2017-05-24 ENCOUNTER — HOSPITAL ENCOUNTER (OUTPATIENT)
Dept: CARDIOLOGY | Facility: CLINIC | Age: 82
Discharge: HOME | End: 2017-05-24
Attending: INTERNAL MEDICINE | Admitting: INTERNAL MEDICINE
Payer: MEDICARE

## 2017-05-24 ENCOUNTER — HOSPITAL ENCOUNTER (OUTPATIENT)
Dept: CARDIOLOGY | Facility: CLINIC | Age: 82
End: 2017-05-24
Attending: INTERNAL MEDICINE
Payer: MEDICARE

## 2017-05-24 VITALS — DIASTOLIC BLOOD PRESSURE: 62 MMHG | SYSTOLIC BLOOD PRESSURE: 115 MMHG | HEART RATE: 57 BPM

## 2017-05-24 DIAGNOSIS — I20.0 UNSTABLE ANGINA (H): ICD-10-CM

## 2017-05-24 DIAGNOSIS — I47.10 PAROXYSMAL SUPRAVENTRICULAR TACHYCARDIA (H): ICD-10-CM

## 2017-05-24 LAB
CREAT BLD-MCNC: 1.1 MG/DL (ref 0.52–1.04)
GFR SERPL CREATININE-BSD FRML MDRD: 47 ML/MIN/1.7M2

## 2017-05-24 PROCEDURE — 93270 REMOTE 30 DAY ECG REV/REPORT: CPT

## 2017-05-24 PROCEDURE — 25000128 H RX IP 250 OP 636: Performed by: INTERNAL MEDICINE

## 2017-05-24 PROCEDURE — 75574 CT ANGIO HRT W/3D IMAGE: CPT

## 2017-05-24 PROCEDURE — 25000132 ZZH RX MED GY IP 250 OP 250 PS 637: Mod: GY | Performed by: INTERNAL MEDICINE

## 2017-05-24 PROCEDURE — 82565 ASSAY OF CREATININE: CPT

## 2017-05-24 PROCEDURE — 75574 CT ANGIO HRT W/3D IMAGE: CPT | Mod: 26 | Performed by: INTERNAL MEDICINE

## 2017-05-24 PROCEDURE — A9270 NON-COVERED ITEM OR SERVICE: HCPCS | Mod: GY | Performed by: INTERNAL MEDICINE

## 2017-05-24 PROCEDURE — 93272 ECG/REVIEW INTERPRET ONLY: CPT | Performed by: INTERNAL MEDICINE

## 2017-05-24 RX ORDER — DIPHENHYDRAMINE HYDROCHLORIDE 50 MG/ML
25-50 INJECTION INTRAMUSCULAR; INTRAVENOUS
Status: DISCONTINUED | OUTPATIENT
Start: 2017-05-24 | End: 2017-05-25 | Stop reason: HOSPADM

## 2017-05-24 RX ORDER — IOPAMIDOL 755 MG/ML
150 INJECTION, SOLUTION INTRAVASCULAR ONCE
Status: COMPLETED | OUTPATIENT
Start: 2017-05-24 | End: 2017-05-24

## 2017-05-24 RX ORDER — DIPHENHYDRAMINE HCL 25 MG
25 CAPSULE ORAL
Status: DISCONTINUED | OUTPATIENT
Start: 2017-05-24 | End: 2017-05-25 | Stop reason: HOSPADM

## 2017-05-24 RX ORDER — METHYLPREDNISOLONE SODIUM SUCCINATE 125 MG/2ML
125 INJECTION, POWDER, LYOPHILIZED, FOR SOLUTION INTRAMUSCULAR; INTRAVENOUS
Status: DISCONTINUED | OUTPATIENT
Start: 2017-05-24 | End: 2017-05-25 | Stop reason: HOSPADM

## 2017-05-24 RX ORDER — ONDANSETRON 2 MG/ML
4 INJECTION INTRAMUSCULAR; INTRAVENOUS
Status: DISCONTINUED | OUTPATIENT
Start: 2017-05-24 | End: 2017-05-25 | Stop reason: HOSPADM

## 2017-05-24 RX ORDER — ACYCLOVIR 200 MG/1
0-1 CAPSULE ORAL
Status: DISCONTINUED | OUTPATIENT
Start: 2017-05-24 | End: 2017-05-25 | Stop reason: HOSPADM

## 2017-05-24 RX ORDER — METOPROLOL TARTRATE 1 MG/ML
5-15 INJECTION, SOLUTION INTRAVENOUS
Status: DISCONTINUED | OUTPATIENT
Start: 2017-05-24 | End: 2017-05-25 | Stop reason: HOSPADM

## 2017-05-24 RX ORDER — NITROGLYCERIN 0.4 MG/1
0.4 TABLET SUBLINGUAL
Status: DISCONTINUED | OUTPATIENT
Start: 2017-05-24 | End: 2017-05-25 | Stop reason: HOSPADM

## 2017-05-24 RX ORDER — METOPROLOL TARTRATE 50 MG
50-100 TABLET ORAL
Status: DISCONTINUED | OUTPATIENT
Start: 2017-05-24 | End: 2017-05-25 | Stop reason: HOSPADM

## 2017-05-24 RX ADMIN — SODIUM CHLORIDE 100 ML: 9 INJECTION, SOLUTION INTRAVENOUS at 13:52

## 2017-05-24 RX ADMIN — IOPAMIDOL 110 ML: 755 INJECTION, SOLUTION INTRAVENOUS at 13:52

## 2017-05-24 RX ADMIN — NITROGLYCERIN 0.4 MG: 0.4 TABLET SUBLINGUAL at 13:41

## 2017-05-24 NOTE — PROGRESS NOTES
"HISTORY OF PRESENT ILLNESS:  I had the pleasure of following up on our mutual patient, Ralph Whitten.  As you know, she is an 86-year-old woman who looks and acts much younger.  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  She had a stress test in 2009 that was negative for ischemia.  Her last echo was in 2016 that showed preserved LV and RV function, mild MR and moderate TR.  The patient also has a history of PAT.  She was placed on diltiazem, both for coronary spasm and her PAT.  She called our office because for the last several weeks, particularly the last week or two, she has noticed left shoulder pain.  It comes on out of the blue, not related to arm motion, not related to exercise.  Usually the episodes lasted for 15-20 minutes.  She called our office.  We instructed her to try nitroglycerin the next day, and she used nitroglycerin and stated the pain went away promptly.  She will also get a nonspecific \"sick\" feeling in her chest we are not sure if that also means that she was having palpitations or not.  She has no dizziness, no syncope.  She does have a history of right rotator cuff surgery, but this is the left shoulder that is bothering her now.      At this juncture, I am going to recommend the following.  Although the story is moderately suspicious for angina, it does not tell me if it is vasospastic angina or new fixed disease which was not present years ago.  I am going to continue the medicines that she is on, and I told her to feel free to use the sublingual nitroglycerin.  We are going to order a CT coronary angiogram to determine if she has new fixed blockage of her coronary arteries.  If it is not covered by the insurance company, we could try a Lexiscan, although she did not want to do that again.  I am not suspicious enough to go to a standard angiogram quite yet.  If the CT coronary angiogram is negative, then the decision is the left shoulder pain " Prinzmetal angina which we are going to treat with escalating doses of nitrates and diltiazem or is it simply an orthopedic problem and I will ask you to weigh in on that if you could.  I am also going to have her wear a 30-day cardiac event monitor just to make sure it is not PAT that she is feeling since she states there was a nonspecific fullness or fluttering in her chest at the same time and she did have a history of PAT in the past.  We will probably want to do a followup echocardiogram next year to follow the tricuspid mitral valve insufficiency, but I do not think that is weighing in.  Lastly, I should comment that apparently she was just started on Celexa about 3 weeks ago.  We are not certain if that is connected or not, but the timing of the Celexa is similar to the timing of the shoulder pain.  Generally, the SSRI medicines do not have any cardiovascular effects, so I do not think that is likely an issue.  Thank you for allowing me to see Ms. Ralph Whitten.      This was a 40 minute visit; greater than 50% counseling.      cc:   [place] [address]         NEDA HANNON MD             D: 2017 15:36   T: 2017 15:39   MT: luke      Name:     RALPH WHITTEN   MRN:      [id number]        Account:      [id number]   :      10/16/1930           Service Date: 2017      Document: [id number]

## 2017-05-26 LAB
CREAT SERPL-MCNC: 1.07 MG/DL (ref 0.57–1.11)
GFR SERPL CREATININE-BSD FRML MDRD: 49 ML/MIN/1.73M2

## 2017-05-30 ENCOUNTER — DOCUMENTATION ONLY (OUTPATIENT)
Dept: CARDIOLOGY | Facility: CLINIC | Age: 82
End: 2017-05-30

## 2017-05-30 NOTE — PROGRESS NOTES
Received fax from [facility] with 5/26/17 Creatinine POCT results.  Results entered into Epic.  Per chart review, pt has OV with WENDY Gage tomorrow, 5/31/17.  DONNY Rosenbaum 2:30 PM 5/30/2017

## 2017-05-31 ENCOUNTER — OFFICE VISIT (OUTPATIENT)
Dept: CARDIOLOGY | Facility: CLINIC | Age: 82
End: 2017-05-31
Attending: INTERNAL MEDICINE
Payer: MEDICARE

## 2017-05-31 VITALS
DIASTOLIC BLOOD PRESSURE: 61 MMHG | HEART RATE: 76 BPM | SYSTOLIC BLOOD PRESSURE: 121 MMHG | BODY MASS INDEX: 23.37 KG/M2 | HEIGHT: 62 IN | WEIGHT: 127 LBS

## 2017-05-31 DIAGNOSIS — I20.1 PRINZMETAL'S ANGINA (H): Primary | ICD-10-CM

## 2017-05-31 DIAGNOSIS — R91.8 PULMONARY NODULES: ICD-10-CM

## 2017-05-31 DIAGNOSIS — I10 ESSENTIAL HYPERTENSION, BENIGN: ICD-10-CM

## 2017-05-31 DIAGNOSIS — R07.1 PAINFUL RESPIRATION: ICD-10-CM

## 2017-05-31 DIAGNOSIS — I20.0 UNSTABLE ANGINA (H): ICD-10-CM

## 2017-05-31 PROCEDURE — 99215 OFFICE O/P EST HI 40 MIN: CPT | Performed by: NURSE PRACTITIONER

## 2017-05-31 NOTE — MR AVS SNAPSHOT
After Visit Summary   5/31/2017    Ralph Whitten    MRN: [id number]           Patient Information     Date Of Birth          10/16/1930        Visit Information        Provider Department      5/31/2017 3:10 PM Gwyn Camara APRN CNP [facility]        Today's Diagnoses     Essential hypertension, benign    -  1    Unstable angina (H)        Painful respiration        Prinzmetal's angina (H)        Pulmonary nodules          Care Instructions    Follow up in 3 weeks    Scan of the right lung nodule in one year              Follow-ups after your visit        Additional Services     Follow-Up with Cardiac Advanced Practice Provider                 Future tests that were ordered for you today     Open Future Orders        Priority Expected Expires Ordered    CT Chest w/o Contrast Routine 5/24/2018 5/15/2019 5/31/2017    Follow-Up with Cardiac Advanced Practice Provider Routine 6/21/2017 5/31/2018 5/31/2017            Who to contact     If you have questions or need follow up information about today's clinic visit or your schedule please contact [facility] directly at [phone].  Normal or non-critical lab and imaging results will be communicated to you by [employer] Systemshart, letter or phone within 4 business days after the clinic has received the results. If you do not hear from us within 7 days, please contact the clinic through [employer] Messagingt or phone. If you have a critical or abnormal lab result, we will notify you by phone as soon as possible.  Submit refill requests through [employer]Jax or call your pharmacy and they will forward the refill request to us. Please allow 3 business days for your refill to be completed.          Additional Information About Your Visit        MyChart Information     [employer]ax lets you send messages to your doctor, view your test results, renew your prescriptions, schedule appointments and more. To  "sign up, go to [email or web address] . Click on \"Log in\" on the left side of the screen, which will take you to the Welcome page. Then click on \"Sign up Now\" on the right side of the page.     You will be asked to enter the access code listed below, as well as some personal information. Please follow the directions to create your username and password.     Your access code is: [email or web address]  Expires: 2017  4:26 PM     Your access code will  in 90 days. If you need help or a new code, please call your [facility] clinic or [phone].        Care EveryWhere ID     This is your Care EveryWhere ID. This could be used by other organizations to access your [facility] medical records  [id number]        Your Vitals Were     Pulse Height BMI (Body Mass Index)             76 1.562 m (5' 1.5\") 23.61 kg/m2          Blood Pressure from Last 3 Encounters:   17 121/61   17 115/62   17 134/52    Weight from Last 3 Encounters:   17 57.6 kg (127 lb)   17 58 kg (127 lb 14.4 oz)   17 57.7 kg (127 lb 3.2 oz)              We Performed the Following     Follow-Up with Cardiac Advanced Practice Provider          Today's Medication Changes          These changes are accurate as of: 17  3:58 PM.  If you have any questions, ask your nurse or doctor.               These medicines have changed or have updated prescriptions.        Dose/Directions    * diltiazem 240 MG 24 hr capsule   This may have changed:  Another medication with the same name was changed. Make sure you understand how and when to take each.   Changed by:  Nnamdi De Jesus MD        Dose:  240 mg   Take 240 mg by mouth daily   Refills:  0       * diltiazem 60 MG tablet   Commonly known as:  CARDIZEM   This may have changed:  additional instructions   Used for:  Paroxysmal supraventricular tachycardia (H)   Changed by:  Nnamdi De Jesus MD        Dose:  60 mg   Take 1 tablet (60 mg) by mouth as needed Take 1 tablet only as " needed for increased palpitatons   Quantity:  10 tablet   Refills:  2       * Notice:  This list has 2 medication(s) that are the same as other medications prescribed for you. Read the directions carefully, and ask your doctor or other care provider to review them with you.             Primary Care Provider Office Phone # Fax #    Ramin Garvey [phone]       [facility] [place] [address]  [facility] [address]        Thank you!     Thank you for choosing [facility] [place]  for your care. Our goal is always to provide you with excellent care. Hearing back from our patients is one way we can continue to improve our services. Please take a few minutes to complete the written survey that you may receive in the mail after your visit with us. Thank you!             Your Updated Medication List - Protect others around you: Learn how to safely use, store and throw away your medicines at www.disposemymeds.org.          This list is accurate as of: 5/31/17  3:58 PM.  Always use your most recent med list.                   Brand Name Dispense Instructions for use    aspirin 81 MG tablet      Take 81 mg by mouth daily       celeXA 10 MG tablet   Generic drug:  citalopram      Take 10 mg by mouth daily       DAILY MULTIVITAMIN PO      Take by mouth daily       * diltiazem 240 MG 24 hr capsule      Take 240 mg by mouth daily       * diltiazem 60 MG tablet    CARDIZEM    10 tablet    Take 1 tablet (60 mg) by mouth as needed Take 1 tablet only as needed for increased palpitatons       ESTRACE VAGINAL 0.1 MG/GM cream   Generic drug:  estradiol      Place 2 g vaginally once a week       LORAZEPAM PO      Take 0.5 mg by mouth daily as needed       losartan 25 MG tablet    COZAAR     Take 25 mg by mouth daily       metFORMIN 500 MG tablet    GLUCOPHAGE     Take 500 mg by mouth 2 times daily (with meals)       metoprolol 25 MG 24 hr tablet    TOPROL-XL    1 tablet    Take 1 tablet  (25 mg) by mouth At Bedtime for 1 dose       * nitroglycerin 0.4 MG sublingual tablet    NITROSTAT     Place 0.4 mg under the tongue every 5 minutes as needed for chest pain For chest pain place 1 tablet under the tongue every 5 minutes for 3 doses. If symptoms persist 5 minutes after 1st dose call 911.       * nitroglycerin 0.4 MG sublingual tablet    NITROSTAT    25 tablet    For chest pain place 1 tablet under the tongue every 5 minutes for 3 doses. If symptoms persist 5 minutes after 1st dose call 911.       OMEPRAZOLE PO      Take 20 mg by mouth       pravastatin 40 MG tablet    PRAVACHOL    90 tablet    Take 1 tablet (40 mg) by mouth At Bedtime       PREMARIN 0.3 MG tablet   Generic drug:  estrogens (conjugated)      Take 0.3 mg by mouth daily       PROBIOTIC ADVANCED Caps      Take by mouth daily       SYNTHROID 75 MCG tablet   Generic drug:  levothyroxine      Take 75 mcg by mouth daily       VITAMIN D3 PO      Take by mouth daily       * Notice:  This list has 4 medication(s) that are the same as other medications prescribed for you. Read the directions carefully, and ask your doctor or other care provider to review them with you.

## 2017-05-31 NOTE — PROGRESS NOTES
HPI and Plan:   See dictation  [id number]    Orders Placed This Encounter   Procedures     CT Chest w/o Contrast     Follow-Up with Cardiac Advanced Practice Provider       No orders of the defined types were placed in this encounter.      There are no discontinued medications.      Encounter Diagnoses   Name Primary?     Unstable angina (H)      Essential hypertension, benign Yes     Painful respiration      Prinzmetal's angina (H)      Pulmonary nodules        CURRENT MEDICATIONS:  Current Outpatient Prescriptions   Medication Sig Dispense Refill     citalopram (CELEXA) 10 MG tablet Take 10 mg by mouth daily       estrogens, conjugated, (PREMARIN) 0.3 MG tablet Take 0.3 mg by mouth daily       estradiol (ESTRACE VAGINAL) 0.1 MG/GM cream Place 2 g vaginally once a week       Probiotic Product (PROBIOTIC ADVANCED) CAPS Take by mouth daily       losartan (COZAAR) 25 MG tablet Take 25 mg by mouth daily       nitroglycerin (NITROSTAT) 0.4 MG sublingual tablet For chest pain place 1 tablet under the tongue every 5 minutes for 3 doses. If symptoms persist 5 minutes after 1st dose call 911. 25 tablet 3     diltiazem (CARDIZEM) 60 MG tablet Take 1 tablet (60 mg) by mouth as needed Take 1 tablet only as needed for increased palpitatons (Patient taking differently: Take 60 mg by mouth as needed ) 10 tablet 2     pravastatin (PRAVACHOL) 40 MG tablet Take 1 tablet (40 mg) by mouth At Bedtime 90 tablet 3     diltiazem 240 MG 24 hr ER capsule Take 240 mg by mouth daily       Multiple Vitamin (DAILY MULTIVITAMIN PO) Take by mouth daily        OMEPRAZOLE PO Take 20 mg by mouth       levothyroxine (SYNTHROID) 75 MCG tablet Take 75 mcg by mouth daily       Cholecalciferol (VITAMIN D3 PO) Take by mouth daily       aspirin 81 MG tablet Take 81 mg by mouth daily       LORAZEPAM PO Take 0.5 mg by mouth daily as needed        metFORMIN (GLUCOPHAGE) 500 MG tablet Take 500 mg by mouth 2 times daily (with meals)       metoprolol (TOPROL-XL) 25  MG 24 hr tablet Take 1 tablet (25 mg) by mouth At Bedtime for 1 dose 1 tablet 0     nitroglycerin (NITROSTAT) 0.4 MG sublingual tablet Place 0.4 mg under the tongue every 5 minutes as needed for chest pain For chest pain place 1 tablet under the tongue every 5 minutes for 3 doses. If symptoms persist 5 minutes after 1st dose call 911.         ALLERGIES     Allergies   Allergen Reactions     Diovan [Valsartan] Other (See Comments) and GI Disturbance     Arthralgia     Hmg-Coa-R Inhibitors Other (See Comments)     Statin Medications give patient Myalgias---simvastatin     Sulfa Drugs Rash       PAST MEDICAL HISTORY:  Past Medical History:   Diagnosis Date     Degenerative disk disease     C 6/7     Diabetes mellitus (H)      Diabetic neuropathy (H)      Essential hypertension, benign      Generalized osteoarthrosis, unspecified site (aka ARTHRITIS)     osteoporosis and osteoarthritis     Hyperlipidemia      Hypothyroid      Mitral valve disorder     mild/mod     PAT (paroxysmal atrial tachycardia) (H)      Prinzmetal angina (H)     no cad 2000     PUD (peptic ulcer disease)      Rheumatic fever      Tricuspid regurgitation     mod     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       PAST SURGICAL HISTORY:  Past Surgical History:   Procedure Laterality Date     APPENDECTOMY       C RAD RESEC TONSIL/PILLARS       CORONARY ANGIOGRAPHY ADULT ORDER  2002    normal Coronary arteries, Vasospasms     HYSTERECTOMY      total     rotator cuff surgery      right     TONSILLECTOMY         FAMILY HISTORY:  Family History   Problem Relation Age of Onset     Coronary Artery Disease Mother      Myocardial Infarction Mother      Heart Failure Father      Unknown/Adopted Maternal Grandmother      Unknown/Adopted Maternal Grandfather      Unknown/Adopted Paternal Grandmother      Unknown/Adopted Paternal Grandfather        SOCIAL HISTORY:  Social History     Social History     Marital status:       "Spouse name: N/A     Number of children: N/A     Years of education: N/A     Social History Main Topics     Smoking status: Never Smoker     Smokeless tobacco: Never Used     Alcohol use Yes      Comment: occ. wine     Drug use: None     Sexual activity: Not Asked     Other Topics Concern     Caffeine Concern No     coffee: 3-4 cups a day     Sleep Concern No     Stress Concern No     Weight Concern No     Special Diet Yes     diabetic diet, healthy     Exercise Yes     walking x3 a week     Seat Belt Yes     Social History Narrative       Review of Systems:  Skin:  Negative       Eyes:  Positive for glasses    ENT:  Negative      Respiratory:  Negative       Cardiovascular:  Negative      Gastroenterology: Negative      Genitourinary:         Musculoskeletal:  Positive for joint pain (left shoulder pain in the middle of night. did not take a nitro tablet.)  (slight discomfort this morning and took a nitro, pain was better)  Neurologic:  Negative      Psychiatric:  Negative      Heme/Lymph/Imm:  Positive for allergies    Endocrine:  Positive for thyroid disorder;diabetes      Physical Exam:  Vitals: /61  Pulse 76  Ht 1.562 m (5' 1.5\")  Wt 57.6 kg (127 lb)  BMI 23.61 kg/m2    Constitutional:  cooperative, alert and oriented, well developed, well nourished, in no acute distress appears younger than stated age      Skin:  warm and dry to the touch, no apparent skin lesions or masses noted        Head:  normocephalic, no masses or lesions        Eyes:  pupils equal and round, conjunctivae and lids unremarkable, sclera white, no xanthalasma, EOMS intact, no nystagmus        ENT:  no pallor or cyanosis, dentition good        Neck:  carotid pulses are full and equal bilaterally, JVP normal, no carotid bruit, no thyromegaly        Chest:  normal breath sounds, clear to auscultation, normal A-P diameter, normal symmetry, normal respiratory excursion, no use of accessory muscles          Cardiac: regular rhythm, " normal S1/S2, no S3 or S4, apical impulse not displaced, no murmurs, gallops or rubs       grade 1;systolic murmur     minimal syst murmru at apex ( echo mild mr, mild+ tr)    Abdomen:  abdomen soft, non-tender, BS normoactive, no mass, no HSM, no bruits        Vascular: pulses full and equal, no bruits auscultated                                        Extremities and Back:  no deformities, clubbing, cyanosis, erythema observed   bilateral LE edema;trace;R greater than L          Neurological:  affect appropriate, oriented to time, person and place              CC  Nnamdi De Jesus MD   [facility]  [address]  [place] [address]

## 2017-05-31 NOTE — LETTER
"5/31/2017    Ramin Garvey  [facility]   [address]  [facility] [address]      RE: Ralph Guthrierenato       Dear Colleague,    I had the pleasure of seeing Ralph Whitten in the [facility].    Ralph Whitten is a very pleasant, spry 86-year-old female followed here by Dr. De Jesus.  In 2000, she had a coronary angiogram showing no significant coronary disease.  She has a confirmed diagnosis of Prinzmetal angina and has done well on diltiazem.  Stress testing in 2009 was negative for ischemia.  Echocardiogram in 2016 showed preserved biventricular function with mild MR and moderate tricuspid insufficiency.      She has had some PAT and has been on diltiazem for both coronary spasm and PAT for a number of years.  When she skips her diltiazem by accident, she has significant chest tightening that she describes as shortness of breath occurring in her trachea.  This will resolve once the diltiazem is on board.      She has recently had episodic left shoulder pain which radiates up into her neck and even to her temporal area in her head.  She has tried nitroglycerin and it does seem to help this discomfort.  She has not used any nitro in the week since she has seen Dr. De Jesus.  Based on this recurrent symptom; however, she was sent for a CT coronary angiogram as well as a 30-day event recorder.  This CT coronary angiogram shows a perhaps 25%-49% stenosis in the mid LAD with a 25% stenosis in the proximal LAD.  The distal LAD courses intramyocardial and was not optimally visualized.  The circumflex and right coronary have no disease.  The total calcium score is 31.  The radiology portion of the test shows 5 mm right lower lobe lung nodule.  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  She " will scan this lung nodule in 1 year to look for any changes.      She has had struggles this year with some depression.  Her  had a lesion on his right temporal area that was deemed benign by exam.  However, when it was removed, it was a squamous cell cancer requiring extensive excision causing nerve paralysis and Bell palsy.  This has profoundly limited activity and social life.  She has become his caregiver, which has caused her a great deal of stress.  She is now on Celexa and this seems to be helping.      I have reviewed her angiogram results with Dr. De Jesus and the patient and I had an extensive discussion.  The option would be to continue medical therapy or do an angiogram with an acetylcholine challenge to assess the LAD occlusion and confirm her history of Prinzmetal angina or see primary care to have the left shoulder examined to rule out any structural causes and return for more discussion about an angiogram after that.  At that point her event recorder will be completed as well as she has only had it for 1 week and has had no palpitations or symptoms to record any tracings.  Exam today is unremarkable.      Lipids are controlled.     Outpatient Encounter Prescriptions as of 5/31/2017   Medication Sig Dispense Refill     citalopram (CELEXA) 10 MG tablet Take 10 mg by mouth daily       estrogens, conjugated, (PREMARIN) 0.3 MG tablet Take 0.3 mg by mouth daily       estradiol (ESTRACE VAGINAL) 0.1 MG/GM cream Place 2 g vaginally once a week       Probiotic Product (PROBIOTIC ADVANCED) CAPS Take by mouth daily       diltiazem (CARDIZEM) 60 MG tablet Take 1 tablet (60 mg) by mouth as needed Take 1 tablet only as needed for increased palpitatons (Patient taking differently: Take 60 mg by mouth as needed ) 10 tablet 2     [DISCONTINUED] losartan (COZAAR) 25 MG tablet Take 25 mg by mouth daily       [DISCONTINUED] nitroglycerin (NITROSTAT) 0.4 MG sublingual tablet For chest pain place 1 tablet under the  tongue every 5 minutes for 3 doses. If symptoms persist 5 minutes after 1st dose call 911. 25 tablet 3     pravastatin (PRAVACHOL) 40 MG tablet Take 1 tablet (40 mg) by mouth At Bedtime 90 tablet 3     diltiazem 240 MG 24 hr ER capsule Take 240 mg by mouth daily       Multiple Vitamin (DAILY MULTIVITAMIN PO) Take by mouth daily        OMEPRAZOLE PO Take 20 mg by mouth       levothyroxine (SYNTHROID) 75 MCG tablet Take 75 mcg by mouth daily       Cholecalciferol (VITAMIN D3 PO) Take by mouth daily       aspirin 81 MG tablet Take 81 mg by mouth daily       LORAZEPAM PO Take 0.5 mg by mouth daily as needed        metFORMIN (GLUCOPHAGE) 500 MG tablet Take 500 mg by mouth 2 times daily (with meals)       metoprolol (TOPROL-XL) 25 MG 24 hr tablet Take 1 tablet (25 mg) by mouth At Bedtime for 1 dose (Patient not taking: Reported on 8/4/2017) 1 tablet 0     nitroglycerin (NITROSTAT) 0.4 MG sublingual tablet Place 0.4 mg under the tongue every 5 minutes as needed for chest pain For chest pain place 1 tablet under the tongue every 5 minutes for 3 doses. If symptoms persist 5 minutes after 1st dose call 911.       No facility-administered encounter medications on file as of 5/31/2017.       IMPRESSION AND PLAN:   1.  Prinzmetal angina with some progression of her LAD, but certainly not flow limiting.  She is having minimal shortness of breath which is typically her angina, but the shoulder discomfort is present but rare, responds to nitroglycerin and unclear what the etiology is.  She will see her primary care physician.  She will return and see me in 3 weeks and if there is no structural cause for her shoulder pain and she continues to have responsiveness to nitro, we will likely pursue a coronary angiogram with acetylcholine challenge on Dr. De Jesus' Cath schedule.  For now, continue her diltiazem and her metoprolol.   2.  Dyslipidemia, controlled.  Continue pravastatin.   3.  Recent depression.  Caregiver for her ,  on Celexa tolerating this well.   4.  History of PAT in the past.  No significant palpitations but we will await the final results of her event recorder and assess at the next visit.     5.  Right lower lobe lung nodule with secondhand smoke exposure.  We will do a CT in 1 year to followup.      Greater than 50% of this 45-minute visit today was spent in counseling.       addendum: monitor strips so far show self limiting atrial tachycardia without associated symptoms. dtk     Again, thank you for allowing me to participate in the care of your patient.      Sincerely,    ANDREZ Arcos CNP     [facility]

## 2017-06-01 ENCOUNTER — TELEPHONE (OUTPATIENT)
Dept: CARDIOLOGY | Facility: CLINIC | Age: 82
End: 2017-06-01

## 2017-06-01 ENCOUNTER — DOCUMENTATION ONLY (OUTPATIENT)
Dept: CARDIOLOGY | Facility: CLINIC | Age: 82
End: 2017-06-01

## 2017-06-01 NOTE — PROGRESS NOTES
Cardionet strips 5/24/17 showing HR 53, 52, 55.  Strip from 5/29/17 showing Non sustained atrial tachycardia HR 89 reviewed by DR JACOME. ISAAC Martinez RN

## 2017-06-01 NOTE — PROGRESS NOTES
"HISTORY OF PRESENT ILLNESS:  Ralph Whitten is a very pleasant, spry 86-year-old female followed here by Dr. De Jesus.  In 2000, she had a coronary angiogram showing no significant coronary disease.  She has a confirmed diagnosis of Prinzmetal angina and has done well on diltiazem.  Stress testing in 2009 was negative for ischemia.  Echocardiogram in 2016 showed preserved biventricular function with mild MR and moderate tricuspid insufficiency.      She has had some PAT and has been on diltiazem for both coronary spasm and PAT for a number of years.  When she skips her diltiazem by accident, she has significant chest tightening that she describes as shortness of breath occurring in her trachea.  This will resolve once the diltiazem is on board.      She has recently had episodic left shoulder pain which radiates up into her neck and even to her temporal area in her head.  She has tried nitroglycerin and it does seem to help this discomfort.  She has not used any nitro in the week since she has seen Dr. De Jesus.  Based on this recurrent symptom; however, she was sent for a CT coronary angiogram as well as a 30-day event recorder.  This CT coronary angiogram shows a perhaps 25%-49% stenosis in the mid LAD with a 25% stenosis in the proximal LAD.  The distal LAD courses intramyocardial and was not optimally visualized.  The circumflex and right coronary have no disease.  The total calcium score is 31.  The radiology portion of the test shows 5 mm right lower lobe lung nodule.  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  She will scan this lung nodule in 1 year to look for any changes.      She has had struggles this year with some depression.  Her  had a lesion on his right temporal area that was deemed benign by exam. "  However, when it was removed, it was a squamous cell cancer requiring extensive excision causing nerve paralysis and Bell palsy.  This has profoundly limited activity and social life.  She has become his caregiver, which has caused her a great deal of stress.  She is now on Celexa and this seems to be helping.      I have reviewed her angiogram results with Dr. De Jesus and the patient and I had an extensive discussion.  The option would be to continue medical therapy or do an angiogram with an acetylcholine challenge to assess the LAD occlusion and confirm her history of Prinzmetal angina or see primary care to have the left shoulder examined to rule out any structural causes and return for more discussion about an angiogram after that.  At that point her event recorder will be completed as well as she has only had it for 1 week and has had no palpitations or symptoms to record any tracings.  Exam today is unremarkable.      Lipids are controlled.      IMPRESSION AND PLAN:   1.  Prinzmetal angina with some progression of her LAD, but certainly not flow limiting.  She is having minimal shortness of breath which is typically her angina, but the shoulder discomfort is present but rare, responds to nitroglycerin and unclear what the etiology is.  She will see her primary care physician.  She will return and see me in 3 weeks and if there is no structural cause for her shoulder pain and she continues to have responsiveness to nitro, we will likely pursue a coronary angiogram with acetylcholine challenge on Dr. De Jesus' Cath schedule.  For now, continue her diltiazem and her metoprolol.   2.  Dyslipidemia, controlled.  Continue pravastatin.   3.  Recent depression.  Caregiver for her , on Celexa tolerating this well.   4.  History of PAT in the past.  No significant palpitations but we will await the final results of her event recorder and assess at the next visit.     5.  Right lower lobe lung nodule with  secondhand smoke exposure.  We will do a CT in 1 year to followup.      Greater than 50% of this 45-minute visit today was spent in counseling.         MARIELA WADE NP        addendum: monitor strips so far show self limiting atrial tachycardia without associated symptoms. sherrie     D: 2017 16:23   T: 2017 11:51   MT: [email or web address]      Name:     PARADISE TIWARI   MRN:      [id number]        Account:      [id number]   :      10/16/1930           Service Date: 2017      Document: [id number]

## 2017-06-13 ENCOUNTER — DOCUMENTATION ONLY (OUTPATIENT)
Dept: CARDIOLOGY | Facility: CLINIC | Age: 82
End: 2017-06-13

## 2017-06-13 NOTE — PROGRESS NOTES
Cardionet strips showing 5/31/17 HR 91 SR, 6/6/17 possible sinus rhythm with afib,/flutter less than 10 seconds.  6/10/17 SR HR 91. Results to arley Martinez RN

## 2017-06-14 ENCOUNTER — TRANSFERRED RECORDS (OUTPATIENT)
Dept: CARDIOLOGY | Facility: CLINIC | Age: 82
End: 2017-06-14

## 2017-06-22 ENCOUNTER — CARE COORDINATION (OUTPATIENT)
Dept: CARDIOLOGY | Facility: CLINIC | Age: 82
End: 2017-06-22

## 2017-06-22 NOTE — PROGRESS NOTES
"Received VM from pt stating that she needs to reschedule 6/27/17 OV with [facility] due to schedule conflict.  She reports she saw PCP regarding shoulder pain, shoulder X-ray did show some structural issues, she got a cortisone injection, which has helped with the shoulder pain.   Pt is wondering if she needs to reschedule f/u with [facility] since shoulder pain seems to be non-cardiac related and is being followed by her PCP.    Per [facility]'s OV note on 5/31/17, \"She will return and see me in 3 weeks and if there is no structural cause for her shoulder pain and she continues to have responsiveness to nitro, we will likely pursue a coronary angiogram with acetylcholine challenge on Dr. De Jesus' Cath schedule.\" \  Prior to calling pt back, will route to [facility] to inquire if/when pt should reschedule OV since shoulder pain is structural in nature.  DONNY Rosenbaum 1:55 PM 6/22/2017    "

## 2017-06-22 NOTE — PROGRESS NOTES
Called pt and reviewed that per [facility], ok for her to reschedule appt to August.  Pt agrees to OV with [facility] 8/4/17 at 10:10am.  Per request, cancelled OV on 6/27.  DONNY Rosenbaum 3:49 PM 6/22/2017

## 2017-08-04 ENCOUNTER — OFFICE VISIT (OUTPATIENT)
Dept: CARDIOLOGY | Facility: CLINIC | Age: 82
End: 2017-08-04
Payer: MEDICARE

## 2017-08-04 VITALS
BODY MASS INDEX: 23.98 KG/M2 | HEIGHT: 61 IN | HEART RATE: 64 BPM | DIASTOLIC BLOOD PRESSURE: 60 MMHG | WEIGHT: 127 LBS | SYSTOLIC BLOOD PRESSURE: 137 MMHG

## 2017-08-04 DIAGNOSIS — I20.1 PRINZMETAL'S ANGINA (H): Primary | ICD-10-CM

## 2017-08-04 DIAGNOSIS — I10 BENIGN ESSENTIAL HYPERTENSION: ICD-10-CM

## 2017-08-04 PROCEDURE — 99214 OFFICE O/P EST MOD 30 MIN: CPT | Performed by: NURSE PRACTITIONER

## 2017-08-04 RX ORDER — LOSARTAN POTASSIUM 50 MG/1
50 TABLET ORAL DAILY
Qty: 90 TABLET | Refills: 3 | Status: SHIPPED | OUTPATIENT
Start: 2017-08-04 | End: 2019-06-20

## 2017-08-04 NOTE — LETTER
8/4/2017    Ramin Garvey  [facility]  [address] [place] [address]    RE: Ralph Whitten       Dear Colleague,    I had the pleasure of seeing Ralph Whitten in the [facility].    History of Present Illness:  Ralph Whitten is a 86 year old female followed here by Dr. De Jesus. She returns today for follow-up. She been having episodic left shoulder pain radiating into her neck. 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    Ralph  had an invasive angiogram in 2000 showing no significant coronary artery disease. She has confirmed diagnosis of Prinzmetal angina. She's done very well on diltiazem. Stress testing in 2009 was unremarkable for ischemia. Echocardiography in 2016 shows preserved biventricular function with mild mitral regurgitation and moderate tricuspid insufficiency    She's had some paroxysmal atrial tachycardia. An event recorder was done relatively recent and shows only short episodes. The computer read this is atrial flutter/fibrillation; this was reviewed by one of our electrophysiologists who stated it was SVT and I concur    She saw an orthopedist recently who injected her left shoulder and interestingly all the discomfort has resolved, as has her neck pain. She now has twinges of discomfort only after physical therapy. She has not used nitroglycerin since her last office visit.     Based on some change in symptoms she underwent a CT coronary angiogram in May showing 25-40% stenosis in the LAD, 25% in the proximal LAD, and with poor visualization of the distal LAD. Circumflex and right coronary had no stenosis. The calcium score total was 31. An incidental finding of a 5 mm right lower lobe lung nodule was found; she will have a repeat CT next spring. 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 She  is now being treated for some depression. Her  had a lesion in his right temporal area that was deemed benign by exam. However when it was surgically removed, it turned out to be an extensive squamous cell carcinoma resulting in nerve paralysis and Bell's palsy.. This has limited his activity and socialization. They were very active couple prior to this and the changes cause her much stress and anxiety.    I note today that her blood pressure here and at her office visits at primary care have been slightly elevated. She's been taking a lot of her blood pressures at home and her cuff has been checked for accuracy. I will increase her losartan and have her come back for a follow-up visit.    She is asking for a referral to a female Primary Care MD within the [facility]. I have given her some names.      Impression/Plan:   1. Prinzmetal angina currently under good control. We had considered a coronary angiogram given her shoulder discomfort which at times was responsive to nitroglycerin. However this has responded beautifully to an injection of steroids through orthopedics as well as physical therapy. For now we will continue to monitor closely.  2. Hypertension uncontrolled increase losartan to 50 mg per day. See me back in 4-6 weeks with a basic metabolic panel  3. Situational depression. On Celexa. This has helped but she still has periods of time where she is feeling quite anxious.  4. Palpitations with event recorder identifying self-limited supra-ventricular tachycardia--continue diltiazem  5. Dyslipidemia LDL 85 HDL 75 continue pravastatin 40 mg daily     It has been a pleasure seeing Ralph Whitten today. I will see her back in 2-4 weks       Gwyn Camara, MSN, APRN-BC, CNP  Cardiology    Orders Placed This Encounter   Procedures     Basic metabolic panel     Follow-Up with Cardiac Advanced Practice Provider     Orders Placed This Encounter   Medications     losartan (COZAAR) 50 MG tablet      Sig: Take 1 tablet (50 mg) by mouth daily     Dispense:  90 tablet     Refill:  3     Medications Discontinued During This Encounter   Medication Reason     nitroglycerin (NITROSTAT) 0.4 MG sublingual tablet Erroneous Entry     losartan (COZAAR) 25 MG tablet Reorder         Encounter Diagnoses   Name Primary?     Prinzmetal's angina (H) Yes     Benign essential hypertension        CURRENT MEDICATIONS:  Current Outpatient Prescriptions   Medication Sig Dispense Refill     losartan (COZAAR) 50 MG tablet Take 1 tablet (50 mg) by mouth daily 90 tablet 3     citalopram (CELEXA) 10 MG tablet Take 10 mg by mouth daily       estrogens, conjugated, (PREMARIN) 0.3 MG tablet Take 0.3 mg by mouth daily       estradiol (ESTRACE VAGINAL) 0.1 MG/GM cream Place 2 g vaginally once a week       Probiotic Product (PROBIOTIC ADVANCED) CAPS Take by mouth daily       nitroglycerin (NITROSTAT) 0.4 MG sublingual tablet Place 0.4 mg under the tongue every 5 minutes as needed for chest pain For chest pain place 1 tablet under the tongue every 5 minutes for 3 doses. If symptoms persist 5 minutes after 1st dose call 911.       diltiazem (CARDIZEM) 60 MG tablet Take 1 tablet (60 mg) by mouth as needed Take 1 tablet only as needed for increased palpitatons (Patient taking differently: Take 60 mg by mouth as needed ) 10 tablet 2     pravastatin (PRAVACHOL) 40 MG tablet Take 1 tablet (40 mg) by mouth At Bedtime 90 tablet 3     diltiazem 240 MG 24 hr ER capsule Take 240 mg by mouth daily       Multiple Vitamin (DAILY MULTIVITAMIN PO) Take by mouth daily        OMEPRAZOLE PO Take 20 mg by mouth       levothyroxine (SYNTHROID) 75 MCG tablet Take 75 mcg by mouth daily       Cholecalciferol (VITAMIN D3 PO) Take by mouth daily       aspirin 81 MG tablet Take 81 mg by mouth daily       LORAZEPAM PO Take 0.5 mg by mouth daily as needed        metFORMIN (GLUCOPHAGE) 500 MG tablet Take 500 mg by mouth 2 times daily (with meals)       metoprolol (TOPROL-XL)  25 MG 24 hr tablet Take 1 tablet (25 mg) by mouth At Bedtime for 1 dose (Patient not taking: Reported on 8/4/2017) 1 tablet 0     [DISCONTINUED] losartan (COZAAR) 25 MG tablet Take 25 mg by mouth daily       [DISCONTINUED] nitroglycerin (NITROSTAT) 0.4 MG sublingual tablet For chest pain place 1 tablet under the tongue every 5 minutes for 3 doses. If symptoms persist 5 minutes after 1st dose call 911. 25 tablet 3       ALLERGIES     Allergies   Allergen Reactions     Diovan [Valsartan] Other (See Comments) and GI Disturbance     Arthralgia     Hmg-Coa-R Inhibitors Other (See Comments)     Statin Medications give patient Myalgias---simvastatin     Sulfa Drugs Rash       PAST MEDICAL HISTORY:  Past Medical History:   Diagnosis Date     Degenerative disk disease     C 6/7     Diabetes mellitus (H)      Diabetic neuropathy (H)      Essential hypertension, benign      Generalized osteoarthrosis, unspecified site (aka ARTHRITIS)     osteoporosis and osteoarthritis     Hyperlipidemia      Hypothyroid      Mitral valve disorder     mild/mod     PAT (paroxysmal atrial tachycardia) (H)      Prinzmetal angina (H)     no cad 2000     PUD (peptic ulcer disease)      Rheumatic fever      Tricuspid regurgitation     mod     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       PAST SURGICAL HISTORY:  Past Surgical History:   Procedure Laterality Date     APPENDECTOMY       C RAD RESEC TONSIL/PILLARS       CORONARY ANGIOGRAPHY ADULT ORDER  2002    normal Coronary arteries, Vasospasms     HYSTERECTOMY      total     rotator cuff surgery      right     TONSILLECTOMY         FAMILY HISTORY:  Family History   Problem Relation Age of Onset     Coronary Artery Disease Mother      Myocardial Infarction Mother      Heart Failure Father      Unknown/Adopted Maternal Grandmother      Unknown/Adopted Maternal Grandfather      Unknown/Adopted Paternal Grandmother      Unknown/Adopted Paternal Grandfather   "      SOCIAL HISTORY:  Social History     Social History     Marital status:      Spouse name: N/A     Number of children: N/A     Years of education: N/A     Social History Main Topics     Smoking status: Never Smoker     Smokeless tobacco: Never Used     Alcohol use Yes      Comment: occ. wine     Drug use: None     Sexual activity: Not Asked     Other Topics Concern     Caffeine Concern No     coffee: 3-4 cups a day     Sleep Concern No     Stress Concern No     Weight Concern No     Special Diet Yes     diabetic diet, healthy     Exercise Yes     walking x3 a week     Seat Belt Yes     Social History Narrative       Review of Systems:  Skin:  Negative     Eyes:  Positive for glasses  ENT:  Negative    Respiratory:  Negative    Cardiovascular:  Negative    Gastroenterology: Negative    Genitourinary:  Positive for urinary frequency  Musculoskeletal:  Positive for joint pain;arthritis (left shoulder pain in the middle of night. did not take a nitro tablet.)  Neurologic:  Negative headaches;numbness or tingling of feet;numbness or tingling of hands  Psychiatric:  Negative    Heme/Lymph/Imm:  Positive for allergies;easy bruising  Endocrine:  Positive for thyroid disorder;diabetes    Physical Exam:  Vitals: /60  Pulse 64  Ht 1.562 m (5' 1.5\")  Wt 57.6 kg (127 lb)  BMI 23.61 kg/m2    Constitutional:  cooperative, alert and oriented, well developed, well nourished, in no acute distress appears younger than stated age      Skin:  warm and dry to the touch, no apparent skin lesions or masses noted        Head:  normocephalic, no masses or lesions        Eyes:  pupils equal and round, conjunctivae and lids unremarkable, sclera white, no xanthalasma, EOMS intact, no nystagmus        ENT:  no pallor or cyanosis, dentition good        Neck:  carotid pulses are full and equal bilaterally, JVP normal, no carotid bruit, no thyromegaly        Chest:  normal breath sounds, clear to auscultation, normal A-P " diameter, normal symmetry, normal respiratory excursion, no use of accessory muscles        Cardiac: regular rhythm, normal S1/S2, no S3 or S4, apical impulse not displaced, no murmurs, gallops or rubs       grade 1;systolic murmur     minimal syst murmru at apex ( echo mild mr, mild+ tr)    Abdomen:  abdomen soft, non-tender, BS normoactive, no mass, no HSM, no bruits        Vascular: pulses full and equal, no bruits auscultated                                      Extremities and Back:  no deformities, clubbing, cyanosis, erythema observed        Neurological:  affect appropriate, oriented to time, person and place          Recent Lab Results:  LIPID RESULTS:  Lab Results   Component Value Date    CHOL 184 04/18/2017    HDL 75 04/18/2017    LDL 85 04/18/2017    TRIG 120 04/18/2017    CHOLHDLRATIO 3.1 01/12/2011       LIVER ENZYME RESULTS:  Lab Results   Component Value Date    AST 23 04/22/2016    ALT <5 (L) 04/18/2017       CBC RESULTS:  Lab Results   Component Value Date    WBC 7.0 03/31/2015    RBC 3.97 03/31/2015    HGB 12.1 03/31/2015    HCT 36.7 03/31/2015    MCV 92 03/31/2015    MCH 30.5 03/31/2015    MCHC 33 03/31/2015    RDW 13.2 03/31/2015     03/31/2015       BMP RESULTS:  Lab Results   Component Value Date     03/31/2015    POTASSIUM 4.6 03/31/2015    CHLORIDE 105 03/31/2015    CO2 24 01/12/2011    ANIONGAP 11 03/31/2015     (A) 03/31/2015    BUN 23 03/31/2015    CR 1.07 05/26/2017    GFRESTIMATED 49 05/26/2017    GFRESTBLACK 59 05/26/2017    CHUN 9.9 03/31/2015        A1C RESULTS:  Lab Results   Component Value Date    A1C 6.9 (A) 04/22/2016       INR RESULTS:  No results found for: INR    Thank you for allowing me to participate in the care of your patient.    Sincerely,     ANDREZ Arcos [facility]

## 2017-08-04 NOTE — PATIENT INSTRUCTIONS
Increase Losartan to 50mg daily    See me back in 4-6 weeks with nonfasting labs    Check a few blood pressure at home and bring those

## 2017-08-04 NOTE — PROGRESS NOTES
History of Present Illness:  Ralph Whitten is a 86 year old female followed here by Dr. De Jesus. She returns today for follow-up. She been having episodic left shoulder pain radiating into her neck. 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    Ralph  had an invasive angiogram in 2000 showing no significant coronary artery disease. She has confirmed diagnosis of Prinzmetal angina. She's done very well on diltiazem. Stress testing in 2009 was unremarkable for ischemia. Echocardiography in 2016 shows preserved biventricular function with mild mitral regurgitation and moderate tricuspid insufficiency    She's had some paroxysmal atrial tachycardia. An event recorder was done relatively recent and shows only short episodes. The computer read this is atrial flutter/fibrillation; this was reviewed by one of our electrophysiologists who stated it was SVT and I concur    She saw an orthopedist recently who injected her left shoulder and interestingly all the discomfort has resolved, as has her neck pain. She now has twinges of discomfort only after physical therapy. She has not used nitroglycerin since her last office visit.     Based on some change in symptoms she underwent a CT coronary angiogram in May showing 25-40% stenosis in the LAD, 25% in the proximal LAD, and with poor visualization of the distal LAD. Circumflex and right coronary had no stenosis. The calcium score total was 31. An incidental finding of a 5 mm right lower lobe lung nodule was found; she will have a repeat CT next spring. 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 She is now being treated for some depression. Her  had a lesion in his right temporal area that was deemed benign by exam. 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 This has limited his activity and socialization. They were very active couple prior to this and the changes cause her much stress and anxiety.    I note today that her blood pressure here and at her office visits at primary care have been slightly elevated. She's been taking a lot of her blood pressures at home and her cuff has been checked for accuracy. I will increase her losartan and have her come back for a follow-up visit.    She is asking for a referral to a female Primary Care MD within [facility]. I have given her some names.      Impression/Plan:   1. Prinzmetal angina currently under good control. We had considered a coronary angiogram given her shoulder discomfort which at times was responsive to nitroglycerin. However this has responded beautifully to an injection of steroids through orthopedics as well as physical therapy. For now we will continue to monitor closely.  2. Hypertension uncontrolled increase losartan to 50 mg per day. See me back in 4-6 weeks with a basic metabolic panel  3. Situational depression. On Celexa. This has helped but she still has periods of time where she is feeling quite anxious.  4. Palpitations with event recorder identifying self-limited supra-ventricular tachycardia--continue diltiazem  5. Dyslipidemia LDL 85 HDL 75 continue pravastatin 40 mg daily     It has been a pleasure seeing Ralph Whitten today. I will see her back in 2-4 weks       Gwyn Camara, MSN, APRN-BC, CNP  Cardiology    Orders Placed This Encounter   Procedures     Basic metabolic panel     Follow-Up with Cardiac Advanced Practice Provider     Orders Placed This Encounter   Medications     losartan (COZAAR) 50 MG tablet     Sig: Take 1 tablet (50 mg) by mouth daily     Dispense:  90 tablet     Refill:  3     Medications Discontinued During This Encounter   Medication Reason     nitroglycerin (NITROSTAT) 0.4 MG sublingual tablet Erroneous  Entry     losartan (COZAAR) 25 MG tablet Reorder         Encounter Diagnoses   Name Primary?     Prinzmetal's angina (H) Yes     Benign essential hypertension        CURRENT MEDICATIONS:  Current Outpatient Prescriptions   Medication Sig Dispense Refill     losartan (COZAAR) 50 MG tablet Take 1 tablet (50 mg) by mouth daily 90 tablet 3     citalopram (CELEXA) 10 MG tablet Take 10 mg by mouth daily       estrogens, conjugated, (PREMARIN) 0.3 MG tablet Take 0.3 mg by mouth daily       estradiol (ESTRACE VAGINAL) 0.1 MG/GM cream Place 2 g vaginally once a week       Probiotic Product (PROBIOTIC ADVANCED) CAPS Take by mouth daily       nitroglycerin (NITROSTAT) 0.4 MG sublingual tablet Place 0.4 mg under the tongue every 5 minutes as needed for chest pain For chest pain place 1 tablet under the tongue every 5 minutes for 3 doses. If symptoms persist 5 minutes after 1st dose call 911.       diltiazem (CARDIZEM) 60 MG tablet Take 1 tablet (60 mg) by mouth as needed Take 1 tablet only as needed for increased palpitatons (Patient taking differently: Take 60 mg by mouth as needed ) 10 tablet 2     pravastatin (PRAVACHOL) 40 MG tablet Take 1 tablet (40 mg) by mouth At Bedtime 90 tablet 3     diltiazem 240 MG 24 hr ER capsule Take 240 mg by mouth daily       Multiple Vitamin (DAILY MULTIVITAMIN PO) Take by mouth daily        OMEPRAZOLE PO Take 20 mg by mouth       levothyroxine (SYNTHROID) 75 MCG tablet Take 75 mcg by mouth daily       Cholecalciferol (VITAMIN D3 PO) Take by mouth daily       aspirin 81 MG tablet Take 81 mg by mouth daily       LORAZEPAM PO Take 0.5 mg by mouth daily as needed        metFORMIN (GLUCOPHAGE) 500 MG tablet Take 500 mg by mouth 2 times daily (with meals)       metoprolol (TOPROL-XL) 25 MG 24 hr tablet Take 1 tablet (25 mg) by mouth At Bedtime for 1 dose (Patient not taking: Reported on 8/4/2017) 1 tablet 0     [DISCONTINUED] losartan (COZAAR) 25 MG tablet Take 25 mg by mouth daily        [DISCONTINUED] nitroglycerin (NITROSTAT) 0.4 MG sublingual tablet For chest pain place 1 tablet under the tongue every 5 minutes for 3 doses. If symptoms persist 5 minutes after 1st dose call 911. 25 tablet 3       ALLERGIES     Allergies   Allergen Reactions     Diovan [Valsartan] Other (See Comments) and GI Disturbance     Arthralgia     Hmg-Coa-R Inhibitors Other (See Comments)     Statin Medications give patient Myalgias---simvastatin     Sulfa Drugs Rash       PAST MEDICAL HISTORY:  Past Medical History:   Diagnosis Date     Degenerative disk disease     C 6/7     Diabetes mellitus (H)      Diabetic neuropathy (H)      Essential hypertension, benign      Generalized osteoarthrosis, unspecified site (aka ARTHRITIS)     osteoporosis and osteoarthritis     Hyperlipidemia      Hypothyroid      Mitral valve disorder     mild/mod     PAT (paroxysmal atrial tachycardia) (H)      Prinzmetal angina (H)     no cad 2000     PUD (peptic ulcer disease)      Rheumatic fever      Tricuspid regurgitation     mod     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       PAST SURGICAL HISTORY:  Past Surgical History:   Procedure Laterality Date     APPENDECTOMY       C RAD RESEC TONSIL/PILLARS       CORONARY ANGIOGRAPHY ADULT ORDER  2002    normal Coronary arteries, Vasospasms     HYSTERECTOMY      total     rotator cuff surgery      right     TONSILLECTOMY         FAMILY HISTORY:  Family History   Problem Relation Age of Onset     Coronary Artery Disease Mother      Myocardial Infarction Mother      Heart Failure Father      Unknown/Adopted Maternal Grandmother      Unknown/Adopted Maternal Grandfather      Unknown/Adopted Paternal Grandmother      Unknown/Adopted Paternal Grandfather        SOCIAL HISTORY:  Social History     Social History     Marital status:      Spouse name: N/A     Number of children: N/A     Years of education: N/A     Social History Main Topics     Smoking status: Never  "Smoker     Smokeless tobacco: Never Used     Alcohol use Yes      Comment: occ. wine     Drug use: None     Sexual activity: Not Asked     Other Topics Concern     Caffeine Concern No     coffee: 3-4 cups a day     Sleep Concern No     Stress Concern No     Weight Concern No     Special Diet Yes     diabetic diet, healthy     Exercise Yes     walking x3 a week     Seat Belt Yes     Social History Narrative       Review of Systems:  Skin:  Negative     Eyes:  Positive for glasses  ENT:  Negative    Respiratory:  Negative    Cardiovascular:  Negative    Gastroenterology: Negative    Genitourinary:  Positive for urinary frequency  Musculoskeletal:  Positive for joint pain;arthritis (left shoulder pain in the middle of night. did not take a nitro tablet.)  Neurologic:  Negative headaches;numbness or tingling of feet;numbness or tingling of hands  Psychiatric:  Negative    Heme/Lymph/Imm:  Positive for allergies;easy bruising  Endocrine:  Positive for thyroid disorder;diabetes    Physical Exam:  Vitals: /60  Pulse 64  Ht 1.562 m (5' 1.5\")  Wt 57.6 kg (127 lb)  BMI 23.61 kg/m2    Constitutional:  cooperative, alert and oriented, well developed, well nourished, in no acute distress appears younger than stated age      Skin:  warm and dry to the touch, no apparent skin lesions or masses noted        Head:  normocephalic, no masses or lesions        Eyes:  pupils equal and round, conjunctivae and lids unremarkable, sclera white, no xanthalasma, EOMS intact, no nystagmus        ENT:  no pallor or cyanosis, dentition good        Neck:  carotid pulses are full and equal bilaterally, JVP normal, no carotid bruit, no thyromegaly        Chest:  normal breath sounds, clear to auscultation, normal A-P diameter, normal symmetry, normal respiratory excursion, no use of accessory muscles        Cardiac: regular rhythm, normal S1/S2, no S3 or S4, apical impulse not displaced, no murmurs, gallops or rubs       grade 1;systolic " murmur     minimal syst murmru at apex ( echo mild mr, mild+ tr)    Abdomen:  abdomen soft, non-tender, BS normoactive, no mass, no HSM, no bruits        Vascular: pulses full and equal, no bruits auscultated                                      Extremities and Back:  no deformities, clubbing, cyanosis, erythema observed        Neurological:  affect appropriate, oriented to time, person and place          Recent Lab Results:  LIPID RESULTS:  Lab Results   Component Value Date    CHOL 184 04/18/2017    HDL 75 04/18/2017    LDL 85 04/18/2017    TRIG 120 04/18/2017    CHOLHDLRATIO 3.1 01/12/2011       LIVER ENZYME RESULTS:  Lab Results   Component Value Date    AST 23 04/22/2016    ALT <5 (L) 04/18/2017       CBC RESULTS:  Lab Results   Component Value Date    WBC 7.0 03/31/2015    RBC 3.97 03/31/2015    HGB 12.1 03/31/2015    HCT 36.7 03/31/2015    MCV 92 03/31/2015    MCH 30.5 03/31/2015    MCHC 33 03/31/2015    RDW 13.2 03/31/2015     03/31/2015       BMP RESULTS:  Lab Results   Component Value Date     03/31/2015    POTASSIUM 4.6 03/31/2015    CHLORIDE 105 03/31/2015    CO2 24 01/12/2011    ANIONGAP 11 03/31/2015     (A) 03/31/2015    BUN 23 03/31/2015    CR 1.07 05/26/2017    GFRESTIMATED 49 05/26/2017    GFRESTBLACK 59 05/26/2017    CHUN 9.9 03/31/2015        A1C RESULTS:  Lab Results   Component Value Date    A1C 6.9 (A) 04/22/2016       INR RESULTS:  No results found for: INR        CC  [facility] [place]  [address] [place] [address]

## 2017-09-21 ENCOUNTER — OFFICE VISIT (OUTPATIENT)
Dept: CARDIOLOGY | Facility: CLINIC | Age: 82
End: 2017-09-21
Attending: NURSE PRACTITIONER
Payer: MEDICARE

## 2017-09-21 VITALS
BODY MASS INDEX: 24.74 KG/M2 | DIASTOLIC BLOOD PRESSURE: 60 MMHG | SYSTOLIC BLOOD PRESSURE: 132 MMHG | WEIGHT: 126 LBS | HEART RATE: 60 BPM | HEIGHT: 60 IN

## 2017-09-21 DIAGNOSIS — I20.1 PRINZMETAL'S ANGINA (H): ICD-10-CM

## 2017-09-21 LAB
ANION GAP SERPL CALCULATED.3IONS-SCNC: 12.1 MMOL/L (ref 6–17)
BUN SERPL-MCNC: 19 MG/DL (ref 7–30)
CALCIUM SERPL-MCNC: 9.5 MG/DL (ref 8.5–10.5)
CHLORIDE SERPL-SCNC: 102 MMOL/L (ref 98–107)
CO2 SERPL-SCNC: 27 MMOL/L (ref 23–29)
CREAT SERPL-MCNC: 1.13 MG/DL (ref 0.7–1.3)
GFR SERPL CREATININE-BSD FRML MDRD: 46 ML/MIN/1.7M2
GLUCOSE SERPL-MCNC: 191 MG/DL (ref 70–105)
POTASSIUM SERPL-SCNC: 4.1 MMOL/L (ref 3.5–5.1)
SODIUM SERPL-SCNC: 137 MMOL/L (ref 136–145)

## 2017-09-21 PROCEDURE — 36415 COLL VENOUS BLD VENIPUNCTURE: CPT | Performed by: NURSE PRACTITIONER

## 2017-09-21 PROCEDURE — 99214 OFFICE O/P EST MOD 30 MIN: CPT | Performed by: NURSE PRACTITIONER

## 2017-09-21 PROCEDURE — 80048 BASIC METABOLIC PNL TOTAL CA: CPT | Performed by: NURSE PRACTITIONER

## 2017-09-21 NOTE — MR AVS SNAPSHOT
"              After Visit Summary   9/21/2017    Ralph Whitten    MRN: [id number]           Patient Information     Date Of Birth          10/16/1930        Visit Information        Provider Department      9/21/2017 10:50 AM Gwyn Camara APRN CNP [facility] [place]        Today's Diagnoses     Prinzmetal's angina (H)          Care Instructions    No changes today    See us back in May with fasting labs          Follow-ups after your visit        Additional Services     Follow-Up with Cardiologist                 Future tests that were ordered for you today     Open Future Orders        Priority Expected Expires Ordered    Follow-Up with Cardiologist Routine 5/18/2018 9/21/2018 9/21/2017    Lipid Profile Routine 5/18/2018 9/21/2018 9/21/2017    Basic metabolic panel Routine 5/18/2018 9/21/2018 9/21/2017            Who to contact     If you have questions or need follow up information about today's clinic visit or your schedule please contact [facility] directly at [phone].  Normal or non-critical lab and imaging results will be communicated to you by MyChart, letter or phone within 4 business days after the clinic has received the results. If you do not hear from us within 7 days, please contact the clinic through MyChart or phone. If you have a critical or abnormal lab result, we will notify you by phone as soon as possible.  Submit refill requests through [employer] or call your pharmacy and they will forward the refill request to us. Please allow 3 business days for your refill to be completed.          Additional Information About Your Visit        Care EveryWhere ID     This is your Care EveryWhere ID. This could be used by other organizations to access your [place] medical records  [id number]        Your Vitals Were     Pulse Height BMI (Body Mass Index)             60 1.526 m (5' 0.08\") 24.54 kg/m2          Blood " Pressure from Last 3 Encounters:   09/21/17 132/60   08/04/17 137/60   05/31/17 121/61    Weight from Last 3 Encounters:   09/21/17 57.2 kg (126 lb)   08/04/17 57.6 kg (127 lb)   05/31/17 57.6 kg (127 lb)              We Performed the Following     Follow-Up with Cardiac Advanced Practice Provider          Today's Medication Changes          These changes are accurate as of: 9/21/17 11:16 AM.  If you have any questions, ask your nurse or doctor.               These medicines have changed or have updated prescriptions.        Dose/Directions    * diltiazem 240 MG 24 hr capsule   This may have changed:  Another medication with the same name was changed. Make sure you understand how and when to take each.   Changed by:  Nnamdi De Jesus MD        Dose:  240 mg   Take 240 mg by mouth daily   Refills:  0       * diltiazem 60 MG tablet   Commonly known as:  CARDIZEM   This may have changed:  additional instructions   Used for:  Paroxysmal supraventricular tachycardia (H)   Changed by:  Nnamdi De Jesus MD        Dose:  60 mg   Take 1 tablet (60 mg) by mouth as needed Take 1 tablet only as needed for increased palpitatons   Quantity:  10 tablet   Refills:  2       * Notice:  This list has 2 medication(s) that are the same as other medications prescribed for you. Read the directions carefully, and ask your doctor or other care provider to review them with you.             Primary Care Provider Office Phone # Fax #    Ramin Garvey [phone]       [facility] [address] [address]        Equal Access to Services     [facility] AH: Hadii aad ku hadasho Soomaali, waaxda luqadaha, qaybta kaalmada adeegyada, waxay vin cage . So [facility] [phone].    ATENCIÓN: Si habla español, tiene a singh disposición servicios gratuitos de asistencia lingüística. Llame al [phone].    We comply with applicable federal civil rights laws and [place] laws. We do not  discriminate on the basis of race, color, national origin, age, disability sex, sexual orientation or gender identity.            Thank you!     Thank you for choosing [facility] [place]  for your care. Our goal is always to provide you with excellent care. Hearing back from our patients is one way we can continue to improve our services. Please take a few minutes to complete the written survey that you may receive in the mail after your visit with us. Thank you!             Your Updated Medication List - Protect others around you: Learn how to safely use, store and throw away your medicines at www.disposemymeds.org.          This list is accurate as of: 9/21/17 11:16 AM.  Always use your most recent med list.                   Brand Name Dispense Instructions for use Diagnosis    aspirin 81 MG tablet      Take 81 mg by mouth daily        celeXA 10 MG tablet   Generic drug:  citalopram      Take 10 mg by mouth daily        DAILY MULTIVITAMIN PO      Take by mouth daily        * diltiazem 240 MG 24 hr capsule      Take 240 mg by mouth daily        * diltiazem 60 MG tablet    CARDIZEM    10 tablet    Take 1 tablet (60 mg) by mouth as needed Take 1 tablet only as needed for increased palpitatons    Paroxysmal supraventricular tachycardia (H)       ESTRACE VAGINAL 0.1 MG/GM cream   Generic drug:  estradiol      Place 2 g vaginally once a week        LORAZEPAM PO      Take 0.5 mg by mouth daily as needed        losartan 50 MG tablet    COZAAR    90 tablet    Take 1 tablet (50 mg) by mouth daily    Benign essential hypertension       metFORMIN 500 MG tablet    GLUCOPHAGE     Take 500 mg by mouth 2 times daily (with meals)        metoprolol 25 MG 24 hr tablet    TOPROL-XL    1 tablet    Take 1 tablet (25 mg) by mouth At Bedtime for 1 dose    Unstable angina (H)       nitroGLYcerin 0.4 MG sublingual tablet    NITROSTAT     Place 0.4 mg under the tongue every 5 minutes as needed for chest  pain For chest pain place 1 tablet under the tongue every 5 minutes for 3 doses. If symptoms persist 5 minutes after 1st dose call 911.        OMEPRAZOLE PO      Take 20 mg by mouth        pravastatin 40 MG tablet    PRAVACHOL    90 tablet    Take 1 tablet (40 mg) by mouth At Bedtime    Prinzmetal angina (H)       PREMARIN 0.3 MG tablet   Generic drug:  estrogens (conjugated)      Take 0.3 mg by mouth daily        PROBIOTIC ADVANCED Caps      Take by mouth daily        SYNTHROID 75 MCG tablet   Generic drug:  levothyroxine      Take 75 mcg by mouth daily        VITAMIN D3 PO      Take by mouth daily        * Notice:  This list has 2 medication(s) that are the same as other medications prescribed for you. Read the directions carefully, and ask your doctor or other care provider to review them with you.

## 2017-09-21 NOTE — PROGRESS NOTES
History of Present Illness:    Ralph Whitten is a 86 year old female followed here by Dr. De Jesus. She is one of her most delightful patients. 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 She was experienced left shoulder pain radiating to her neck and we were uncertain if this represented angina.    She had an invasive angiogram in 2000 showing no significant coronary artery disease but confirmed diagnosis of Prinzmetal angina. She has done well on diltiazem. Stress testing in 2009 was unremarkable for ischemia. Based on her change in symptoms of left shoulder pain she recently underwent a CT coronary in May of this year showing a 25-40% stenosis in the LAD, 25% proximal LAD with poor visualization of the distal LAD. The circumflex and right coronary had no stenosis. Total calcium score was 31. There was an incidental finding of a 5 mm right lower lung lower lobe lung nodule and she will have a repeat CT scan spring. 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    She has a history of paroxysmal atrial tachycardia. Event recorder most recently showed SVT. The computer read this out as atrial flutter/fibrillation but after reviewing with an electrophysiologist A stated it was SVT.    She has significant social stressors in her life. 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 This is limited their activity, socialization, and caused stress and anxiety for her. She has recently had more recurrence with Mohs procedures. He also fell and fractured ribs so the stressors are ongoing    Her blood pressure has been borderline controlled and Losartan was increased. Today her blood pressure is 132/60 which is reasonable and the same at home on her cuff, which has  been checked for accuracy.    She actually underwent a left shoulder injection and this summer in physical therapy was started which now she is not able to attend due to the ongoing issues with her . This has essentially resolved her shoulder pain so it is unlikely that that was actually angina. She is otherwise doing well.      Impression/Plan:     1. Prinzmetal angina currently under good control. We had considered an invasive coronary angiogram but her shoulder pain has responded to an injection of steroids and physical therapy via the orthopedic clinic. We will continue her medical management.  2. Hypertension improved control continue losartan 50 mg per day. Her basic metabolic panel is normal.  3. Situational depression on Celexa--this is likely in part due to the stress being a caregiver for her -details outlined above  4. Palpitations. Event recorder identified at self-limited supraventricular tachycardia continue diltiazem  5. Dyslipidemia treated with LDL 85--continue pravastatin 40 mg day. Recheck labs in the spring    I have asked her to return to see Dr. De Jesus in May of next year. I would be happy to see her at any point should she have resurgence of chest discomfort.      Gwyn Camara, MSN, APRN-BC, CNP  Cardiology    No orders of the defined types were placed in this encounter.    No orders of the defined types were placed in this encounter.    There are no discontinued medications.      Encounter Diagnosis   Name Primary?     Prinzmetal's angina (H)        CURRENT MEDICATIONS:  Current Outpatient Prescriptions   Medication Sig Dispense Refill     losartan (COZAAR) 50 MG tablet Take 1 tablet (50 mg) by mouth daily 90 tablet 3     citalopram (CELEXA) 10 MG tablet Take 10 mg by mouth daily       estrogens, conjugated, (PREMARIN) 0.3 MG tablet Take 0.3 mg by mouth daily       estradiol (ESTRACE VAGINAL) 0.1 MG/GM cream Place 2 g vaginally once a week       Probiotic Product  (PROBIOTIC ADVANCED) CAPS Take by mouth daily       nitroglycerin (NITROSTAT) 0.4 MG sublingual tablet Place 0.4 mg under the tongue every 5 minutes as needed for chest pain For chest pain place 1 tablet under the tongue every 5 minutes for 3 doses. If symptoms persist 5 minutes after 1st dose call 911.       diltiazem (CARDIZEM) 60 MG tablet Take 1 tablet (60 mg) by mouth as needed Take 1 tablet only as needed for increased palpitatons (Patient taking differently: Take 60 mg by mouth as needed ) 10 tablet 2     pravastatin (PRAVACHOL) 40 MG tablet Take 1 tablet (40 mg) by mouth At Bedtime 90 tablet 3     diltiazem 240 MG 24 hr ER capsule Take 240 mg by mouth daily       Multiple Vitamin (DAILY MULTIVITAMIN PO) Take by mouth daily        OMEPRAZOLE PO Take 20 mg by mouth       levothyroxine (SYNTHROID) 75 MCG tablet Take 75 mcg by mouth daily       Cholecalciferol (VITAMIN D3 PO) Take by mouth daily       aspirin 81 MG tablet Take 81 mg by mouth daily       metFORMIN (GLUCOPHAGE) 500 MG tablet Take 500 mg by mouth 2 times daily (with meals)       metoprolol (TOPROL-XL) 25 MG 24 hr tablet Take 1 tablet (25 mg) by mouth At Bedtime for 1 dose (Patient not taking: Reported on 8/4/2017) 1 tablet 0     LORAZEPAM PO Take 0.5 mg by mouth daily as needed          ALLERGIES     Allergies   Allergen Reactions     Diovan [Valsartan] Other (See Comments) and GI Disturbance     Arthralgia     Hmg-Coa-R Inhibitors Other (See Comments)     Statin Medications give patient Myalgias---simvastatin     Sulfa Drugs Rash       PAST MEDICAL HISTORY:  Past Medical History:   Diagnosis Date     Degenerative disk disease     C 6/7     Diabetes mellitus (H)      Diabetic neuropathy (H)      Essential hypertension, benign      Generalized osteoarthrosis, unspecified site (aka ARTHRITIS)     osteoporosis and osteoarthritis     Hyperlipidemia      Hypothyroid      Mitral valve disorder     mild/mod     PAT (paroxysmal atrial tachycardia) (H)       Prinzmetal angina (H)     no cad 2000     PUD (peptic ulcer disease)      Rheumatic fever      Tricuspid regurgitation     mod     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       PAST SURGICAL HISTORY:  Past Surgical History:   Procedure Laterality Date     APPENDECTOMY       C RAD RESEC TONSIL/PILLARS       CORONARY ANGIOGRAPHY ADULT ORDER  2002    normal Coronary arteries, Vasospasms     HYSTERECTOMY      total     rotator cuff surgery      right     TONSILLECTOMY         FAMILY HISTORY:  Family History   Problem Relation Age of Onset     Coronary Artery Disease Mother      Myocardial Infarction Mother      Heart Failure Father      Unknown/Adopted Maternal Grandmother      Unknown/Adopted Maternal Grandfather      Unknown/Adopted Paternal Grandmother      Unknown/Adopted Paternal Grandfather        SOCIAL HISTORY:  Social History     Social History     Marital status:      Spouse name: N/A     Number of children: N/A     Years of education: N/A     Social History Main Topics     Smoking status: Never Smoker     Smokeless tobacco: Never Used     Alcohol use Yes      Comment: occ. wine     Drug use: None     Sexual activity: Not Asked     Other Topics Concern     Parent/Sibling W/ Cabg, Mi Or Angioplasty Before 65f 55m? No     Caffeine Concern No     coffee: 3-4 cups a day     Sleep Concern No     Stress Concern No     Weight Concern No     Special Diet Yes     diabetic diet, healthy     Exercise Yes     walking x3 a week     Seat Belt Yes     Social History Narrative       Review of Systems:  Skin:  Negative     Eyes:  Positive for glasses  ENT:  Negative    Respiratory:  Positive for cough  Cardiovascular:  Negative Positive for;palpitations  Gastroenterology: Positive for heartburn;reflux  Genitourinary:  Positive for urinary frequency;urgency  Musculoskeletal:  Positive for joint pain;arthritis;joint swelling (left shoulder pain in the middle of night. did not take a  "nitro tablet.)  Neurologic:  Negative headaches;numbness or tingling of feet;numbness or tingling of hands  Psychiatric:  Negative    Heme/Lymph/Imm:  Positive for allergies;easy bruising  Endocrine:  Positive for thyroid disorder;diabetes    Physical Exam:  Vitals: /64  Pulse 60  Ht 1.526 m (5' 0.08\")  Wt 57.2 kg (126 lb)  BMI 24.54 kg/m2    Constitutional:           Skin:           Head:           Eyes:           ENT:           Neck:           Chest:           Cardiac:                    Abdomen:           Vascular:                                        Extremities and Back:           Neurological:             Recent Lab Results:  LIPID RESULTS:  Lab Results   Component Value Date    CHOL 184 04/18/2017    HDL 75 04/18/2017    LDL 85 04/18/2017    TRIG 120 04/18/2017    CHOLHDLRATIO 3.1 01/12/2011       LIVER ENZYME RESULTS:  Lab Results   Component Value Date    AST 23 04/22/2016    ALT <5 (L) 04/18/2017       CBC RESULTS:  Lab Results   Component Value Date    WBC 7.0 03/31/2015    RBC 3.97 03/31/2015    HGB 12.1 03/31/2015    HCT 36.7 03/31/2015    MCV 92 03/31/2015    MCH 30.5 03/31/2015    MCHC 33 03/31/2015    RDW 13.2 03/31/2015     03/31/2015       BMP RESULTS:  Lab Results   Component Value Date     09/21/2017    POTASSIUM 4.1 09/21/2017    CHLORIDE 102 09/21/2017    CO2 27 09/21/2017    ANIONGAP 12.1 09/21/2017     (H) 09/21/2017    BUN 19 09/21/2017    CR 1.13 09/21/2017    GFRESTIMATED 46 (L) 09/21/2017    GFRESTBLACK 55 (L) 09/21/2017    CHUN 9.5 09/21/2017        A1C RESULTS:  Lab Results   Component Value Date    A1C 6.9 (A) 04/22/2016       INR RESULTS:  No results found for: INR        CC  Gwyn Camara, APRN CNP  [address]  [place] [address]                "

## 2017-09-21 NOTE — LETTER
9/21/2017    Ramin Garvey  [facility]   [address]  [facility] [address]    RE: Ralph Whitten       Dear Colleague,    I had the pleasure of seeing Ralhp Whitten in the [facility].    History of Present Illness:    Ralph Whitten is a 86 year old female followed here by Dr. De Jesus. She is one of her most delightful patients. 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 She was experienced left shoulder pain radiating to her neck and we were uncertain if this represented angina.    She had an invasive angiogram in 2000 showing no significant coronary artery disease but confirmed diagnosis of Prinzmetal angina. She has done well on diltiazem. Stress testing in 2009 was unremarkable for ischemia. Based on her change in symptoms of left shoulder pain she recently underwent a CT coronary in May of this year showing a 25-40% stenosis in the LAD, 25% proximal LAD with poor visualization of the distal LAD. The circumflex and right coronary had no stenosis. Total calcium score was 31. There was an incidental finding of a 5 mm right lower lung lower lobe lung nodule and she will have a repeat CT scan spring. 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    She has a history of paroxysmal atrial tachycardia. Event recorder most recently showed SVT. The computer read this out as atrial flutter/fibrillation but after reviewing with an electrophysiologist A stated it was SVT.    She has significant social stressors in her life. 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 This is limited their activity, socialization, and caused stress and anxiety for her. She has recently had more recurrence with Mohs procedures. He also fell  and fractured ribs so the stressors are ongoing    Her blood pressure has been borderline controlled and Losartan was increased. Today her blood pressure is 132/60 which is reasonable and the same at home on her cuff, which has been checked for accuracy.    She actually underwent a left shoulder injection and this summer in physical therapy was started which now she is not able to attend due to the ongoing issues with her . This has essentially resolved her shoulder pain so it is unlikely that that was actually angina. She is otherwise doing well.      Impression/Plan:     1. Prinzmetal angina currently under good control. We had considered an invasive coronary angiogram but her shoulder pain has responded to an injection of steroids and physical therapy via the orthopedic clinic. We will continue her medical management.  2. Hypertension improved control continue losartan 50 mg per day. Her basic metabolic panel is normal.  3. Situational depression on Celexa--this is likely in part due to the stress being a caregiver for her -details outlined above  4. Palpitations. Event recorder identified at self-limited supraventricular tachycardia continue diltiazem  5. Dyslipidemia treated with LDL 85--continue pravastatin 40 mg day. Recheck labs in the spring    I have asked her to return to see Dr. De Jesus in May of next year. I would be happy to see her at any point should she have resurgence of chest discomfort.      Gwyn Camara, MSN, APRN-BC, CNP  Cardiology    No orders of the defined types were placed in this encounter.    No orders of the defined types were placed in this encounter.    There are no discontinued medications.      Encounter Diagnosis   Name Primary?     Prinzmetal's angina (H)        CURRENT MEDICATIONS:  Current Outpatient Prescriptions   Medication Sig Dispense Refill     losartan (COZAAR) 50 MG tablet Take 1 tablet (50 mg) by mouth daily 90 tablet 3     citalopram (CELEXA) 10  MG tablet Take 10 mg by mouth daily       estrogens, conjugated, (PREMARIN) 0.3 MG tablet Take 0.3 mg by mouth daily       estradiol (ESTRACE VAGINAL) 0.1 MG/GM cream Place 2 g vaginally once a week       Probiotic Product (PROBIOTIC ADVANCED) CAPS Take by mouth daily       nitroglycerin (NITROSTAT) 0.4 MG sublingual tablet Place 0.4 mg under the tongue every 5 minutes as needed for chest pain For chest pain place 1 tablet under the tongue every 5 minutes for 3 doses. If symptoms persist 5 minutes after 1st dose call 911.       diltiazem (CARDIZEM) 60 MG tablet Take 1 tablet (60 mg) by mouth as needed Take 1 tablet only as needed for increased palpitatons (Patient taking differently: Take 60 mg by mouth as needed ) 10 tablet 2     pravastatin (PRAVACHOL) 40 MG tablet Take 1 tablet (40 mg) by mouth At Bedtime 90 tablet 3     diltiazem 240 MG 24 hr ER capsule Take 240 mg by mouth daily       Multiple Vitamin (DAILY MULTIVITAMIN PO) Take by mouth daily        OMEPRAZOLE PO Take 20 mg by mouth       levothyroxine (SYNTHROID) 75 MCG tablet Take 75 mcg by mouth daily       Cholecalciferol (VITAMIN D3 PO) Take by mouth daily       aspirin 81 MG tablet Take 81 mg by mouth daily       metFORMIN (GLUCOPHAGE) 500 MG tablet Take 500 mg by mouth 2 times daily (with meals)       metoprolol (TOPROL-XL) 25 MG 24 hr tablet Take 1 tablet (25 mg) by mouth At Bedtime for 1 dose (Patient not taking: Reported on 8/4/2017) 1 tablet 0     LORAZEPAM PO Take 0.5 mg by mouth daily as needed          ALLERGIES     Allergies   Allergen Reactions     Diovan [Valsartan] Other (See Comments) and GI Disturbance     Arthralgia     Hmg-Coa-R Inhibitors Other (See Comments)     Statin Medications give patient Myalgias---simvastatin     Sulfa Drugs Rash       PAST MEDICAL HISTORY:  Past Medical History:   Diagnosis Date     Degenerative disk disease     C 6/7     Diabetes mellitus (H)      Diabetic neuropathy (H)      Essential hypertension, benign       Generalized osteoarthrosis, unspecified site (aka ARTHRITIS)     osteoporosis and osteoarthritis     Hyperlipidemia      Hypothyroid      Mitral valve disorder     mild/mod     PAT (paroxysmal atrial tachycardia) (H)      Prinzmetal angina (H)     no cad 2000     PUD (peptic ulcer disease)      Rheumatic fever      Tricuspid regurgitation     mod     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       PAST SURGICAL HISTORY:  Past Surgical History:   Procedure Laterality Date     APPENDECTOMY       C RAD RESEC TONSIL/PILLARS       CORONARY ANGIOGRAPHY ADULT ORDER  2002    normal Coronary arteries, Vasospasms     HYSTERECTOMY      total     rotator cuff surgery      right     TONSILLECTOMY         FAMILY HISTORY:  Family History   Problem Relation Age of Onset     Coronary Artery Disease Mother      Myocardial Infarction Mother      Heart Failure Father      Unknown/Adopted Maternal Grandmother      Unknown/Adopted Maternal Grandfather      Unknown/Adopted Paternal Grandmother      Unknown/Adopted Paternal Grandfather        SOCIAL HISTORY:  Social History     Social History     Marital status:      Spouse name: N/A     Number of children: N/A     Years of education: N/A     Social History Main Topics     Smoking status: Never Smoker     Smokeless tobacco: Never Used     Alcohol use Yes      Comment: occ. wine     Drug use: None     Sexual activity: Not Asked     Other Topics Concern     Parent/Sibling W/ Cabg, Mi Or Angioplasty Before 65f 55m? No     Caffeine Concern No     coffee: 3-4 cups a day     Sleep Concern No     Stress Concern No     Weight Concern No     Special Diet Yes     diabetic diet, healthy     Exercise Yes     walking x3 a week     Seat Belt Yes     Social History Narrative       Review of Systems:  Skin:  Negative     Eyes:  Positive for glasses  ENT:  Negative    Respiratory:  Positive for cough  Cardiovascular:  Negative Positive  "for;palpitations  Gastroenterology: Positive for heartburn;reflux  Genitourinary:  Positive for urinary frequency;urgency  Musculoskeletal:  Positive for joint pain;arthritis;joint swelling (left shoulder pain in the middle of night. did not take a nitro tablet.)  Neurologic:  Negative headaches;numbness or tingling of feet;numbness or tingling of hands  Psychiatric:  Negative    Heme/Lymph/Imm:  Positive for allergies;easy bruising  Endocrine:  Positive for thyroid disorder;diabetes    Physical Exam:  Vitals: /64  Pulse 60  Ht 1.526 m (5' 0.08\")  Wt 57.2 kg (126 lb)  BMI 24.54 kg/m2    Constitutional:           Skin:           Head:           Eyes:           ENT:           Neck:           Chest:           Cardiac:                    Abdomen:           Vascular:                                        Extremities and Back:           Neurological:             Recent Lab Results:  LIPID RESULTS:  Lab Results   Component Value Date    CHOL 184 04/18/2017    HDL 75 04/18/2017    LDL 85 04/18/2017    TRIG 120 04/18/2017    CHOLHDLRATIO 3.1 01/12/2011       LIVER ENZYME RESULTS:  Lab Results   Component Value Date    AST 23 04/22/2016    ALT <5 (L) 04/18/2017       CBC RESULTS:  Lab Results   Component Value Date    WBC 7.0 03/31/2015    RBC 3.97 03/31/2015    HGB 12.1 03/31/2015    HCT 36.7 03/31/2015    MCV 92 03/31/2015    MCH 30.5 03/31/2015    MCHC 33 03/31/2015    RDW 13.2 03/31/2015     03/31/2015       BMP RESULTS:  Lab Results   Component Value Date     09/21/2017    POTASSIUM 4.1 09/21/2017    CHLORIDE 102 09/21/2017    CO2 27 09/21/2017    ANIONGAP 12.1 09/21/2017     (H) 09/21/2017    BUN 19 09/21/2017    CR 1.13 09/21/2017    GFRESTIMATED 46 (L) 09/21/2017    GFRESTBLACK 55 (L) 09/21/2017    CHUN 9.5 09/21/2017        A1C RESULTS:  Lab Results   Component Value Date    A1C 6.9 (A) 04/22/2016       INR RESULTS:  No results found for: INR    Thank you for allowing me to participate " in the care of your patient.    Sincerely,     ANDREZ Arcos [facility]

## 2017-10-05 DIAGNOSIS — I20.1 PRINZMETAL ANGINA (H): ICD-10-CM

## 2017-10-05 RX ORDER — PRAVASTATIN SODIUM 40 MG
40 TABLET ORAL AT BEDTIME
Qty: 90 TABLET | Refills: 3 | Status: SHIPPED | OUTPATIENT
Start: 2017-10-05

## 2018-05-23 ENCOUNTER — HOSPITAL ENCOUNTER (OUTPATIENT)
Dept: CARDIOLOGY | Facility: CLINIC | Age: 83
Discharge: HOME | End: 2018-05-23
Attending: NURSE PRACTITIONER | Admitting: NURSE PRACTITIONER
Payer: MEDICARE

## 2018-05-23 DIAGNOSIS — R91.8 PULMONARY NODULES: ICD-10-CM

## 2018-05-23 PROCEDURE — 71250 CT THORAX DX C-: CPT

## 2018-05-29 ENCOUNTER — CARE COORDINATION (OUTPATIENT)
Dept: CARDIOLOGY | Facility: CLINIC | Age: 83
End: 2018-05-29

## 2018-05-29 NOTE — PROGRESS NOTES
Spoke w/ pt and reviewed Gwyn's msg above. Told her I'd call PCP office and update that team, and that they would let her know if any addnl imaging is needed. Pt notes that she's been seeing Dr. Chad Mazariegos through [facility]. Updated PCP info in Epic.     Called [facility] and spoke w/ nursing staff Marla. Reviewed that pt had a cardiac CTA 2017, w/ incidental finding of lung nodules and rec was for f/u CT scan this year. Reviewed that cardiology provider requesting Dr. Berry review report and that their office call pt if any addnl f/u is needed. She agreed.     Jackie Lindsey [facility] RN 11:02 AM 05/29/18  [phone]

## 2018-06-18 ENCOUNTER — OFFICE VISIT (OUTPATIENT)
Dept: CARDIOLOGY | Facility: CLINIC | Age: 83
End: 2018-06-18
Attending: NURSE PRACTITIONER
Payer: MEDICARE

## 2018-06-18 VITALS
HEART RATE: 59 BPM | SYSTOLIC BLOOD PRESSURE: 134 MMHG | BODY MASS INDEX: 23 KG/M2 | WEIGHT: 125 LBS | HEIGHT: 62 IN | DIASTOLIC BLOOD PRESSURE: 68 MMHG | OXYGEN SATURATION: 98 %

## 2018-06-18 DIAGNOSIS — I47.10 PAROXYSMAL SUPRAVENTRICULAR TACHYCARDIA (H): Primary | ICD-10-CM

## 2018-06-18 DIAGNOSIS — I20.1 PRINZMETAL'S ANGINA (H): ICD-10-CM

## 2018-06-18 LAB
ANION GAP SERPL CALCULATED.3IONS-SCNC: 13.7 MMOL/L (ref 6–17)
BUN SERPL-MCNC: 24 MG/DL (ref 7–30)
CALCIUM SERPL-MCNC: 10.1 MG/DL (ref 8.5–10.5)
CHLORIDE SERPL-SCNC: 103 MMOL/L (ref 98–107)
CHOLEST SERPL-MCNC: 155 MG/DL
CO2 SERPL-SCNC: 24 MMOL/L (ref 23–29)
CREAT SERPL-MCNC: 1.11 MG/DL (ref 0.7–1.3)
GFR SERPL CREATININE-BSD FRML MDRD: 46 ML/MIN/1.7M2
GLUCOSE SERPL-MCNC: 127 MG/DL (ref 70–105)
HDLC SERPL-MCNC: 69 MG/DL
LDLC SERPL CALC-MCNC: 73 MG/DL
NONHDLC SERPL-MCNC: 86 MG/DL
POTASSIUM SERPL-SCNC: 4.7 MMOL/L (ref 3.5–5.1)
SODIUM SERPL-SCNC: 136 MMOL/L (ref 136–145)
TRIGL SERPL-MCNC: 63 MG/DL

## 2018-06-18 PROCEDURE — 99214 OFFICE O/P EST MOD 30 MIN: CPT | Performed by: INTERNAL MEDICINE

## 2018-06-18 PROCEDURE — 80048 BASIC METABOLIC PNL TOTAL CA: CPT | Performed by: NURSE PRACTITIONER

## 2018-06-18 PROCEDURE — 36415 COLL VENOUS BLD VENIPUNCTURE: CPT | Performed by: NURSE PRACTITIONER

## 2018-06-18 PROCEDURE — 80061 LIPID PANEL: CPT | Performed by: NURSE PRACTITIONER

## 2018-06-18 NOTE — LETTER
6/18/2018      NOLBERTO DURON  [address]  [facility] [address]      RE: Ralph Whitten       Dear Colleague,    I had the pleasure of seeing Ralph Whitten in the [facility].    Service Date: 06/18/2018      HISTORY OF PRESENT ILLNESS:  I had the pleasure following up on mutual patient, Ralph Whitten.  As you know, she is a delightful 87-year-old woman.  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  She had an angiogram in 2000 that documented Prinzmetal angina with trivial coronary disease.  A followup CAT scan, calcium scoring, angiogram showed she had 40% lesions or less last year.  She was having left shoulder pain which we did not necessarily think was angina.  Subsequently, she was given a steroid injection and that left arm pain is gone.  She still occasionally takes some sublingual nitroglycerin but it is very rare.      Another issue which is much more important, I think, is that she has a history of SVT but not atrial fibrillation.  She wore an event monitor in 2017.  This was reviewed with the electrophysiologist.  Although the computer read it as atrial fib, the strips actually showed SVT, confirmed by our electrophysiologist.  The reason this is so important is, as you know, the patient had a stroke in 02/2018.  She was seen at [facility].  She was treated with tPA.  I do not have all the data but it looks like they did an echocardiogram which is relatively unremarkable.  On MRI of the head including the carotid arteries, they do not make mention of any significant disease.  I am concerned that she is only on a baby aspirin now and the actual cause of this stroke was never found unless they just assumed it was platelet plaque from her mild carotid disease but given the history of possible SVT or atrial fib in the past, I have a very low threshold to placing her on a NOAC agent.  I am going to have her wear a 30-day CardioNet event  monitor and if we see anything that looks like atrial fib, I would suggest that we stop the aspirin and place her on a NOAC agent.  Of note, she was placed on Celexa for some anxiety and depression.  She notes a little bruising.  That agent is actually a mild blood thinner.  On top of the aspirin and age, I told her that is probably why she had some bruising but it is very mild.  Of course, if she goes on a NOAC agent, that is going to be a bigger issue.        LABORATORIES:  Electrolytes and lipids were drawn today.  I think they are reasonable.  Sodium 136, potassium 4.7, creatinine 1.11.  Her glucose is 127.  She tells me her hemoglobin A1c through your office was less than 7%.  HDL 69, LDL 73 with a goal of 70 or less.  I think at 3 points over, that is fine.  We have the option of increasing Pravachol from 40 to 80 or going to a higher potency drug such as Lipitor or Crestor and again I would have a very low threshold, given that she had a stroke, to go to one of the stronger agents but I did not make that change today.  Her triglycerides were 63.        An incidental lung nodule was found when we did a CT of her coronary arteries last year.  She had a followup one this year suggesting that these are all low risk, under 6 mm and additional testing was not necessary.  We will see the patient back in about a month after she wears the event monitor.       Thank you for allowing me to see Ms. Whitten.  Today's visit was 30 minutes, greater than 50% counseling.      Nnamdi Hannon MD       cc:   Chad Berry MD    [address], [place] [address]         NNAMDI HANNON MD             D: 2018   T: 2018   MT: YANIQUE      Name:     PARADISE WHITTEN   MRN:      [id number]        Account:      [id number]   :      10/16/1930           Service Date: 2018      Document: [id number]         Outpatient Encounter Prescriptions as of 2018   Medication Sig Dispense  Refill     aspirin 81 MG tablet Take 81 mg by mouth daily       Cholecalciferol (VITAMIN D3 PO) Take by mouth daily       citalopram (CELEXA) 10 MG tablet Take 10 mg by mouth daily       diltiazem (CARDIZEM) 60 MG tablet Take 1 tablet (60 mg) by mouth as needed Take 1 tablet only as needed for increased palpitatons (Patient taking differently: Take 60 mg by mouth as needed ) 10 tablet 2     diltiazem 240 MG 24 hr ER capsule Take 240 mg by mouth daily       levothyroxine (SYNTHROID) 75 MCG tablet Take 75 mcg by mouth daily       LORAZEPAM PO Take 0.5 mg by mouth daily as needed        losartan (COZAAR) 50 MG tablet Take 1 tablet (50 mg) by mouth daily 90 tablet 3     metFORMIN (GLUCOPHAGE) 500 MG tablet Take 500 mg by mouth 2 times daily (with meals)       Multiple Vitamin (DAILY MULTIVITAMIN PO) Take by mouth daily        nitroglycerin (NITROSTAT) 0.4 MG sublingual tablet Place 0.4 mg under the tongue every 5 minutes as needed for chest pain For chest pain place 1 tablet under the tongue every 5 minutes for 3 doses. If symptoms persist 5 minutes after 1st dose call 911.       OMEPRAZOLE PO Take 20 mg by mouth       pravastatin (PRAVACHOL) 40 MG tablet Take 1 tablet (40 mg) by mouth At Bedtime 90 tablet 3     [DISCONTINUED] estradiol (ESTRACE VAGINAL) 0.1 MG/GM cream Place 2 g vaginally once a week       [DISCONTINUED] estrogens, conjugated, (PREMARIN) 0.3 MG tablet Take 0.3 mg by mouth daily       [DISCONTINUED] metoprolol (TOPROL-XL) 25 MG 24 hr tablet Take 1 tablet (25 mg) by mouth At Bedtime for 1 dose 1 tablet 0     [DISCONTINUED] Probiotic Product (PROBIOTIC ADVANCED) CAPS Take by mouth daily       No facility-administered encounter medications on file as of 6/18/2018.        Again, thank you for allowing me to participate in the care of your patient.      Sincerely,    Nnamdi De Jesus MD     [facility]

## 2018-06-18 NOTE — PROGRESS NOTES
HPI and Plan:   See dictation    Orders Placed This Encounter   Procedures     Follow-Up with Cardiac Advanced Practice Provider     Cardiac Event Monitor - Peds/Adult     No orders of the defined types were placed in this encounter.    Medications Discontinued During This Encounter   Medication Reason     estradiol (ESTRACE VAGINAL) 0.1 MG/GM cream Medication Reconciliation Clean Up     estrogens, conjugated, (PREMARIN) 0.3 MG tablet Medication Reconciliation Clean Up     Probiotic Product (PROBIOTIC ADVANCED) CAPS Medication Reconciliation Clean Up     metoprolol (TOPROL-XL) 25 MG 24 hr tablet          Encounter Diagnoses   Name Primary?     Prinzmetal's angina (H)      Paroxysmal supraventricular tachycardia (H) Yes       CURRENT MEDICATIONS:  Current Outpatient Prescriptions   Medication Sig Dispense Refill     aspirin 81 MG tablet Take 81 mg by mouth daily       Cholecalciferol (VITAMIN D3 PO) Take by mouth daily       citalopram (CELEXA) 10 MG tablet Take 10 mg by mouth daily       diltiazem (CARDIZEM) 60 MG tablet Take 1 tablet (60 mg) by mouth as needed Take 1 tablet only as needed for increased palpitatons (Patient taking differently: Take 60 mg by mouth as needed ) 10 tablet 2     diltiazem 240 MG 24 hr ER capsule Take 240 mg by mouth daily       levothyroxine (SYNTHROID) 75 MCG tablet Take 75 mcg by mouth daily       LORAZEPAM PO Take 0.5 mg by mouth daily as needed        losartan (COZAAR) 50 MG tablet Take 1 tablet (50 mg) by mouth daily 90 tablet 3     metFORMIN (GLUCOPHAGE) 500 MG tablet Take 500 mg by mouth 2 times daily (with meals)       Multiple Vitamin (DAILY MULTIVITAMIN PO) Take by mouth daily        nitroglycerin (NITROSTAT) 0.4 MG sublingual tablet Place 0.4 mg under the tongue every 5 minutes as needed for chest pain For chest pain place 1 tablet under the tongue every 5 minutes for 3 doses. If symptoms persist 5 minutes after 1st dose call 911.       OMEPRAZOLE PO Take 20 mg by mouth        pravastatin (PRAVACHOL) 40 MG tablet Take 1 tablet (40 mg) by mouth At Bedtime 90 tablet 3       ALLERGIES     Allergies   Allergen Reactions     Diovan [Valsartan] Other (See Comments) and GI Disturbance     Arthralgia     Hmg-Coa-R Inhibitors Other (See Comments)     Statin Medications give patient Myalgias---simvastatin     Sulfa Drugs Rash       PAST MEDICAL HISTORY:  Past Medical History:   Diagnosis Date     CVA (cerebral vascular accident) (H) 02/2018    ANW-details unk, treated with t-pa (dizziness presenting sx)     Degenerative disk disease     C 6/7     Diabetes mellitus (H)      Diabetic neuropathy (H)      Essential hypertension, benign      Generalized osteoarthrosis, unspecified site (aka ARTHRITIS)     osteoporosis and osteoarthritis     Hyperlipidemia      Hypothyroid      Mitral valve disorder     mild/mod     PAT (paroxysmal atrial tachycardia) (H)      Prinzmetal angina (H)     no cad 2000     PUD (peptic ulcer disease)      Rheumatic fever      Tricuspid regurgitation     mod     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       PAST SURGICAL HISTORY:  Past Surgical History:   Procedure Laterality Date     APPENDECTOMY       C RAD RESEC TONSIL/PILLARS       CORONARY ANGIOGRAPHY ADULT ORDER  2002    normal Coronary arteries, Vasospasms     HYSTERECTOMY      total     rotator cuff surgery      right     TONSILLECTOMY         FAMILY HISTORY:  Family History   Problem Relation Age of Onset     Coronary Artery Disease Mother      Myocardial Infarction Mother      Heart Failure Father      Unknown/Adopted Maternal Grandmother      Unknown/Adopted Maternal Grandfather      Unknown/Adopted Paternal Grandmother      Unknown/Adopted Paternal Grandfather        SOCIAL HISTORY:  Social History     Social History     Marital status:      Spouse name: N/A     Number of children: N/A     Years of education: N/A     Social History Main Topics     Smoking status: Never  "Smoker     Smokeless tobacco: Never Used     Alcohol use Yes      Comment: occ. wine     Drug use: None     Sexual activity: Not Asked     Other Topics Concern     Parent/Sibling W/ Cabg, Mi Or Angioplasty Before 65f 55m? No     Caffeine Concern No     coffee: 3-4 cups a day     Sleep Concern No     Stress Concern No     Weight Concern No     Special Diet Yes     diabetic diet, healthy     Exercise Yes     walking x3 a week     Seat Belt Yes     Social History Narrative       Review of Systems:  Skin:  Negative     Eyes:  Positive for glasses  ENT:  Negative    Respiratory:  Positive for cough  Cardiovascular:  Negative Positive for;fatigue  Gastroenterology: Positive for heartburn;reflux  Genitourinary:  Positive for urinary frequency;urgency  Musculoskeletal:  Positive for joint pain;arthritis;joint swelling  Neurologic:  Positive for headaches;numbness or tingling of feet;numbness or tingling of hands  Psychiatric:  Negative    Heme/Lymph/Imm:  Positive for allergies;easy bruising  Endocrine:  Positive for thyroid disorder;diabetes    Physical Exam:  Vitals: /68 (BP Location: Right arm, Patient Position: Chair, Cuff Size: Adult Regular)  Pulse 59  Ht 1.562 m (5' 1.5\")  Wt 56.7 kg (125 lb)  SpO2 98%  BMI 23.24 kg/m2    Constitutional:  cooperative, alert and oriented, well developed, well nourished, in no acute distress appears younger than stated age      Skin:  warm and dry to the touch, no apparent skin lesions or masses noted          Head:  normocephalic, no masses or lesions        Eyes:  pupils equal and round, conjunctivae and lids unremarkable, sclera white, no xanthalasma, EOMS intact, no nystagmus        Lymph:No Cervical lymphadenopathy present     ENT:  no pallor or cyanosis, dentition good        Neck:  carotid pulses are full and equal bilaterally, JVP normal, no carotid bruit        Respiratory:  normal breath sounds, clear to auscultation, normal A-P diameter, normal symmetry, normal " respiratory excursion, no use of accessory muscles         Cardiac: regular rhythm, normal S1/S2, no S3 or S4, apical impulse not displaced, no murmurs, gallops or rubs       grade 1;systolic murmur        pulses full and equal, no bruits auscultated                                        GI:  abdomen soft, non-tender, BS normoactive, no mass, no HSM, no bruits        Extremities and Muscular Skeletal:  no deformities, clubbing, cyanosis, erythema observed;no edema              Neurological:  no gross motor deficits        Psych:  Alert and Oriented x 3      Recent Lab Results:  LIPID RESULTS:  Lab Results   Component Value Date    CHOL 155 06/18/2018    HDL 69 06/18/2018    LDL 73 06/18/2018    TRIG 63 06/18/2018    CHOLHDLRATIO 3.1 01/12/2011       LIVER ENZYME RESULTS:  Lab Results   Component Value Date    AST 23 04/22/2016    ALT <5 (L) 04/18/2017       CBC RESULTS:  Lab Results   Component Value Date    WBC 7.0 03/31/2015    RBC 3.97 03/31/2015    HGB 12.1 03/31/2015    HCT 36.7 03/31/2015    MCV 92 03/31/2015    MCH 30.5 03/31/2015    MCHC 33 03/31/2015    RDW 13.2 03/31/2015     03/31/2015       BMP RESULTS:  Lab Results   Component Value Date     06/18/2018    POTASSIUM 4.7 06/18/2018    CHLORIDE 103 06/18/2018    CO2 24 06/18/2018    ANIONGAP 13.7 06/18/2018     (H) 06/18/2018    BUN 24 06/18/2018    CR 1.11 06/18/2018    GFRESTIMATED 46 (L) 06/18/2018    GFRESTBLACK 56 (L) 06/18/2018    CHUN 10.1 06/18/2018        A1C RESULTS:  Lab Results   Component Value Date    A1C 6.9 (A) 04/22/2016       INR RESULTS:  No results found for: INR        CC  Gwyn Camara, APRN CNP  [address]  [place] [address]   no

## 2018-06-18 NOTE — PROGRESS NOTES
Service Date: 06/18/2018      HISTORY OF PRESENT ILLNESS:  I had the pleasure following up on mutual patient, Ralph Whitten.  As you know, she is a delightful 87-year-old woman.  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  She had an angiogram in 2000 that documented Prinzmetal angina with trivial coronary disease.  A followup CAT scan, calcium scoring, angiogram showed she had 40% lesions or less last year.  She was having left shoulder pain which we did not necessarily think was angina.  Subsequently, she was given a steroid injection and that left arm pain is gone.  She still occasionally takes some sublingual nitroglycerin but it is very rare.      Another issue which is much more important, I think, is that she has a history of SVT but not atrial fibrillation.  She wore an event monitor in 2017.  This was reviewed with the electrophysiologist.  Although the computer read it as atrial fib, the strips actually showed SVT, confirmed by our electrophysiologist.  The reason this is so important is, as you know, the patient had a stroke in 02/2018.  She was seen at [facility].  She was treated with tPA.  I do not have all the data but it looks like they did an echocardiogram which is relatively unremarkable.  On MRI of the head including the carotid arteries, they do not make mention of any significant disease.  I am concerned that she is only on a baby aspirin now and the actual cause of this stroke was never found unless they just assumed it was platelet plaque from her mild carotid disease but given the history of possible SVT or atrial fib in the past, I have a very low threshold to placing her on a NOAC agent.  I am going to have her wear a 30-day CardioNet event monitor and if we see anything that looks like atrial fib, I would suggest that we stop the aspirin and place her on a NOAC agent.  Of note, she was placed on Celexa for some anxiety and depression.  She notes a little bruising.  That agent  is actually a mild blood thinner.  On top of the aspirin and age, I told her that is probably why she had some bruising but it is very mild.  Of course, if she goes on a NOAC agent, that is going to be a bigger issue.        LABORATORIES:  Electrolytes and lipids were drawn today.  I think they are reasonable.  Sodium 136, potassium 4.7, creatinine 1.11.  Her glucose is 127.  She tells me her hemoglobin A1c through your office was less than 7%.  HDL 69, LDL 73 with a goal of 70 or less.  I think at 3 points over, that is fine.  We have the option of increasing Pravachol from 40 to 80 or going to a higher potency drug such as Lipitor or Crestor and again I would have a very low threshold, given that she had a stroke, to go to one of the stronger agents but I did not make that change today.  Her triglycerides were 63.        An incidental lung nodule was found when we did a CT of her coronary arteries last year.  She had a followup one this year suggesting that these are all low risk, under 6 mm and additional testing was not necessary.  We will see the patient back in about a month after she wears the event monitor.       Thank you for allowing me to see Ms. Whitten.  Today's visit was 30 minutes, greater than 50% counseling.      Nnamdi Hannon MD       cc:   Chad Berry MD    [address], [place] [address]         NNAMDI HANNON MD             D: 2018   T: 2018   MT: YANIQUE      Name:     PARADISE WHITTEN   MRN:      [id number]        Account:      [id number]   :      10/16/1930           Service Date: 2018      Document: [id number]

## 2018-06-18 NOTE — LETTER
6/18/2018    NOLBERTO DURON  [address]  [facility] [address]    RE: Ralph Whitten       Dear Colleague,    I had the pleasure of seeing Ralph Whitten in the [facility].    HPI and Plan:   See dictation    Orders Placed This Encounter   Procedures     Follow-Up with Cardiac Advanced Practice Provider     Cardiac Event Monitor - Peds/Adult     No orders of the defined types were placed in this encounter.    Medications Discontinued During This Encounter   Medication Reason     estradiol (ESTRACE VAGINAL) 0.1 MG/GM cream Medication Reconciliation Clean Up     estrogens, conjugated, (PREMARIN) 0.3 MG tablet Medication Reconciliation Clean Up     Probiotic Product (PROBIOTIC ADVANCED) CAPS Medication Reconciliation Clean Up     metoprolol (TOPROL-XL) 25 MG 24 hr tablet          Encounter Diagnoses   Name Primary?     Prinzmetal's angina (H)      Paroxysmal supraventricular tachycardia (H) Yes       CURRENT MEDICATIONS:  Current Outpatient Prescriptions   Medication Sig Dispense Refill     aspirin 81 MG tablet Take 81 mg by mouth daily       Cholecalciferol (VITAMIN D3 PO) Take by mouth daily       citalopram (CELEXA) 10 MG tablet Take 10 mg by mouth daily       diltiazem (CARDIZEM) 60 MG tablet Take 1 tablet (60 mg) by mouth as needed Take 1 tablet only as needed for increased palpitatons (Patient taking differently: Take 60 mg by mouth as needed ) 10 tablet 2     diltiazem 240 MG 24 hr ER capsule Take 240 mg by mouth daily       levothyroxine (SYNTHROID) 75 MCG tablet Take 75 mcg by mouth daily       LORAZEPAM PO Take 0.5 mg by mouth daily as needed        losartan (COZAAR) 50 MG tablet Take 1 tablet (50 mg) by mouth daily 90 tablet 3     metFORMIN (GLUCOPHAGE) 500 MG tablet Take 500 mg by mouth 2 times daily (with meals)       Multiple Vitamin (DAILY MULTIVITAMIN PO) Take by mouth daily        nitroglycerin (NITROSTAT) 0.4 MG sublingual tablet Place 0.4  mg under the tongue every 5 minutes as needed for chest pain For chest pain place 1 tablet under the tongue every 5 minutes for 3 doses. If symptoms persist 5 minutes after 1st dose call 911.       OMEPRAZOLE PO Take 20 mg by mouth       pravastatin (PRAVACHOL) 40 MG tablet Take 1 tablet (40 mg) by mouth At Bedtime 90 tablet 3       ALLERGIES     Allergies   Allergen Reactions     Diovan [Valsartan] Other (See Comments) and GI Disturbance     Arthralgia     Hmg-Coa-R Inhibitors Other (See Comments)     Statin Medications give patient Myalgias---simvastatin     Sulfa Drugs Rash       PAST MEDICAL HISTORY:  Past Medical History:   Diagnosis Date     CVA (cerebral vascular accident) (H) 02/2018    ANW-details unk, treated with t-pa (dizziness presenting sx)     Degenerative disk disease     C 6/7     Diabetes mellitus (H)      Diabetic neuropathy (H)      Essential hypertension, benign      Generalized osteoarthrosis, unspecified site (aka ARTHRITIS)     osteoporosis and osteoarthritis     Hyperlipidemia      Hypothyroid      Mitral valve disorder     mild/mod     PAT (paroxysmal atrial tachycardia) (H)      Prinzmetal angina (H)     no cad 2000     PUD (peptic ulcer disease)      Rheumatic fever      Tricuspid regurgitation     mod     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       PAST SURGICAL HISTORY:  Past Surgical History:   Procedure Laterality Date     APPENDECTOMY       C RAD RESEC TONSIL/PILLARS       CORONARY ANGIOGRAPHY ADULT ORDER  2002    normal Coronary arteries, Vasospasms     HYSTERECTOMY      total     rotator cuff surgery      right     TONSILLECTOMY         FAMILY HISTORY:  Family History   Problem Relation Age of Onset     Coronary Artery Disease Mother      Myocardial Infarction Mother      Heart Failure Father      Unknown/Adopted Maternal Grandmother      Unknown/Adopted Maternal Grandfather      Unknown/Adopted Paternal Grandmother      Unknown/Adopted  "Paternal Grandfather        SOCIAL HISTORY:  Social History     Social History     Marital status:      Spouse name: N/A     Number of children: N/A     Years of education: N/A     Social History Main Topics     Smoking status: Never Smoker     Smokeless tobacco: Never Used     Alcohol use Yes      Comment: occ. wine     Drug use: None     Sexual activity: Not Asked     Other Topics Concern     Parent/Sibling W/ Cabg, Mi Or Angioplasty Before 65f 55m? No     Caffeine Concern No     coffee: 3-4 cups a day     Sleep Concern No     Stress Concern No     Weight Concern No     Special Diet Yes     diabetic diet, healthy     Exercise Yes     walking x3 a week     Seat Belt Yes     Social History Narrative       Review of Systems:  Skin:  Negative     Eyes:  Positive for glasses  ENT:  Negative    Respiratory:  Positive for cough  Cardiovascular:  Negative Positive for;fatigue  Gastroenterology: Positive for heartburn;reflux  Genitourinary:  Positive for urinary frequency;urgency  Musculoskeletal:  Positive for joint pain;arthritis;joint swelling  Neurologic:  Positive for headaches;numbness or tingling of feet;numbness or tingling of hands  Psychiatric:  Negative    Heme/Lymph/Imm:  Positive for allergies;easy bruising  Endocrine:  Positive for thyroid disorder;diabetes    Physical Exam:  Vitals: /68 (BP Location: Right arm, Patient Position: Chair, Cuff Size: Adult Regular)  Pulse 59  Ht 1.562 m (5' 1.5\")  Wt 56.7 kg (125 lb)  SpO2 98%  BMI 23.24 kg/m2    Constitutional:  cooperative, alert and oriented, well developed, well nourished, in no acute distress appears younger than stated age      Skin:  warm and dry to the touch, no apparent skin lesions or masses noted          Head:  normocephalic, no masses or lesions        Eyes:  pupils equal and round, conjunctivae and lids unremarkable, sclera white, no xanthalasma, EOMS intact, no nystagmus        Lymph:No Cervical lymphadenopathy present     ENT:  " no pallor or cyanosis, dentition good        Neck:  carotid pulses are full and equal bilaterally, JVP normal, no carotid bruit        Respiratory:  normal breath sounds, clear to auscultation, normal A-P diameter, normal symmetry, normal respiratory excursion, no use of accessory muscles         Cardiac: regular rhythm, normal S1/S2, no S3 or S4, apical impulse not displaced, no murmurs, gallops or rubs       grade 1;systolic murmur        pulses full and equal, no bruits auscultated                                        GI:  abdomen soft, non-tender, BS normoactive, no mass, no HSM, no bruits        Extremities and Muscular Skeletal:  no deformities, clubbing, cyanosis, erythema observed;no edema              Neurological:  no gross motor deficits        Psych:  Alert and Oriented x 3      Recent Lab Results:  LIPID RESULTS:  Lab Results   Component Value Date    CHOL 155 06/18/2018    HDL 69 06/18/2018    LDL 73 06/18/2018    TRIG 63 06/18/2018    CHOLHDLRATIO 3.1 01/12/2011       LIVER ENZYME RESULTS:  Lab Results   Component Value Date    AST 23 04/22/2016    ALT <5 (L) 04/18/2017       CBC RESULTS:  Lab Results   Component Value Date    WBC 7.0 03/31/2015    RBC 3.97 03/31/2015    HGB 12.1 03/31/2015    HCT 36.7 03/31/2015    MCV 92 03/31/2015    MCH 30.5 03/31/2015    MCHC 33 03/31/2015    RDW 13.2 03/31/2015     03/31/2015       BMP RESULTS:  Lab Results   Component Value Date     06/18/2018    POTASSIUM 4.7 06/18/2018    CHLORIDE 103 06/18/2018    CO2 24 06/18/2018    ANIONGAP 13.7 06/18/2018     (H) 06/18/2018    BUN 24 06/18/2018    CR 1.11 06/18/2018    GFRESTIMATED 46 (L) 06/18/2018    GFRESTBLACK 56 (L) 06/18/2018    CHUN 10.1 06/18/2018        A1C RESULTS:  Lab Results   Component Value Date    A1C 6.9 (A) 04/22/2016       INR RESULTS:  No results found for: INR        CC  Gwyn Camara, APRN CNP  [address]  [place] [address]    Thank you for allowing me to  participate in the care of your patient.      Sincerely,     Nnamdi De Jesus MD     [facility]    cc:   Gwyn Camara, APRN CNP  [address]  [place] [address]

## 2018-06-18 NOTE — MR AVS SNAPSHOT
After Visit Summary   6/18/2018    Ralph Whitten    MRN: [id number]           Patient Information     Date Of Birth          10/16/1930        Visit Information        Provider Department      6/18/2018 8:45 AM Nnamdi De Jesus MD [facility]        Today's Diagnoses     Paroxysmal supraventricular tachycardia (H)    -  1    Prinzmetal's angina (H)           Follow-ups after your visit        Additional Services     Follow-Up with Cardiac Advanced Practice Provider       F/U MARIELA IN 1 MONTH (AFTER THE 30 DAYS OF EVENT MONITOR)                  Your next 10 appointments already scheduled     Jun 19, 2018  2:00 PM CDT   Event Monitor with [facility] - [facility] ([facility])    [address]  [facility] [id number] [phone]            Jul 25, 2018 10:10 AM CDT   Return Visit with ANDREZ Arcos CNP   [facility] ([facility])    [address]  [facility] [id number] [phone] OPT 2              Future tests that were ordered for you today     Open Future Orders        Priority Expected Expires Ordered    Cardiac Event Monitor - Peds/Adult Routine 6/25/2018 6/18/2019 6/18/2018    Follow-Up with Cardiac Advanced Practice Provider Routine 6/25/2018 6/18/2019 6/18/2018            Who to contact     If you have questions or need follow up information about today's clinic visit or your schedule please contact [facility] directly at [phone].  Normal or non-critical lab and imaging results will be communicated to you by MyChart, letter or phone within 4 business days after the clinic has received the results. If you do not hear from us within 7 days, please contact the clinic through MyChart or phone. If you have a critical or abnormal lab result, we will notify you by phone as soon  "as possible.  Submit refill requests through [employer] or call your pharmacy and they will forward the refill request to us. Please allow 3 business days for your refill to be completed.          Additional Information About Your Visit        [employer] Information     [employer] lets you send messages to your doctor, view your test results, renew your prescriptions, schedule appointments and more. To sign up, go to [email or web address] . Click on \"Log in\" on the left side of the screen, which will take you to the Welcome page. Then click on \"Sign up Now\" on the right side of the page.     You will be asked to enter the access code listed below, as well as some personal information. Please follow the directions to create your username and password.     Your access code is: [email or web address]  Expires: 2018 12:40 PM     Your access code will  in 90 days. If you need help or a new code, please call your [facility] clinic or [phone].        Care EveryWhere ID     This is your Care EveryWhere ID. This could be used by other organizations to access your [facility] medical records  [id number]        Your Vitals Were     Pulse Height Pulse Oximetry BMI (Body Mass Index)          59 1.562 m (5' 1.5\") 98% 23.24 kg/m2         Blood Pressure from Last 3 Encounters:   18 134/68   17 132/60   17 137/60    Weight from Last 3 Encounters:   18 56.7 kg (125 lb)   17 57.2 kg (126 lb)   17 57.6 kg (127 lb)              We Performed the Following     Follow-Up with Cardiologist          Today's Medication Changes          These changes are accurate as of 18  9:59 AM.  If you have any questions, ask your nurse or doctor.               These medicines have changed or have updated prescriptions.        Dose/Directions    * diltiazem 240 MG 24 hr capsule   This may have changed:  Another medication with the same name was changed. Make sure you understand how and when to take each.        Dose:  " 240 mg   Take 240 mg by mouth daily   Refills:  0       * diltiazem 60 MG tablet   Commonly known as:  CARDIZEM   This may have changed:  additional instructions   Used for:  Paroxysmal supraventricular tachycardia (H)        Dose:  60 mg   Take 1 tablet (60 mg) by mouth as needed Take 1 tablet only as needed for increased palpitatons   Quantity:  10 tablet   Refills:  2       * Notice:  This list has 2 medication(s) that are the same as other medications prescribed for you. Read the directions carefully, and ask your doctor or other care provider to review them with you.      Stop taking these medicines if you haven't already. Please contact your care team if you have questions.     metoprolol succinate 25 MG 24 hr tablet   Commonly known as:  TOPROL-XL   Stopped by:  Nnamdi De Jesus MD                    Primary Care Provider Office Phone # Fax #    Prernayi Jamal [phone]       [address] SMITH [address] [place] [id number]        Equal Access to Services     LAURIE [facility] CenterEVELIA AH: Hadii aad ku hadasho Soomaali, waaxda luqadaha, qaybta kaalmada adeegyada, waxay idiin hayaan michelle khomar cage . So [facility] [phone].    ATENCIÓN: Si habla español, tiene a singh disposición servicios gratuitos de asistencia lingüística. Llame al [phone].    We comply with applicable federal civil rights laws and [place] laws. We do not discriminate on the basis of race, color, national origin, age, disability, sex, sexual orientation, or gender identity.            Thank you!     Thank you for choosing [facility]  for your care. Our goal is always to provide you with excellent care. Hearing back from our patients is one way we can continue to improve our services. Please take a few minutes to complete the written survey that you may receive in the mail after your visit with us. Thank you!             Your Updated Medication List - Protect others around you:  Learn how to safely use, store and throw away your medicines at www.disposemymeds.org.          This list is accurate as of 6/18/18  9:59 AM.  Always use your most recent med list.                   Brand Name Dispense Instructions for use Diagnosis    aspirin 81 MG tablet      Take 81 mg by mouth daily        celeXA 10 MG tablet   Generic drug:  citalopram      Take 10 mg by mouth daily        DAILY MULTIVITAMIN PO      Take by mouth daily        * diltiazem 240 MG 24 hr capsule      Take 240 mg by mouth daily        * diltiazem 60 MG tablet    CARDIZEM    10 tablet    Take 1 tablet (60 mg) by mouth as needed Take 1 tablet only as needed for increased palpitatons    Paroxysmal supraventricular tachycardia (H)       LORAZEPAM PO      Take 0.5 mg by mouth daily as needed        losartan 50 MG tablet    COZAAR    90 tablet    Take 1 tablet (50 mg) by mouth daily    Benign essential hypertension       metFORMIN 500 MG tablet    GLUCOPHAGE     Take 500 mg by mouth 2 times daily (with meals)        nitroGLYcerin 0.4 MG sublingual tablet    NITROSTAT     Place 0.4 mg under the tongue every 5 minutes as needed for chest pain For chest pain place 1 tablet under the tongue every 5 minutes for 3 doses. If symptoms persist 5 minutes after 1st dose call 911.        OMEPRAZOLE PO      Take 20 mg by mouth        pravastatin 40 MG tablet    PRAVACHOL    90 tablet    Take 1 tablet (40 mg) by mouth At Bedtime    Prinzmetal angina (H)       SYNTHROID 75 MCG tablet   Generic drug:  levothyroxine      Take 75 mcg by mouth daily        VITAMIN D3 PO      Take by mouth daily        * Notice:  This list has 2 medication(s) that are the same as other medications prescribed for you. Read the directions carefully, and ask your doctor or other care provider to review them with you.

## 2018-06-19 ENCOUNTER — HOSPITAL ENCOUNTER (OUTPATIENT)
Dept: CARDIOLOGY | Facility: CLINIC | Age: 83
Discharge: HOME | End: 2018-06-19
Attending: INTERNAL MEDICINE | Admitting: INTERNAL MEDICINE
Payer: MEDICARE

## 2018-06-19 DIAGNOSIS — I47.10 PAROXYSMAL SUPRAVENTRICULAR TACHYCARDIA (H): ICD-10-CM

## 2018-06-19 DIAGNOSIS — I20.1 PRINZMETAL'S ANGINA (H): ICD-10-CM

## 2018-06-19 PROCEDURE — 93272 ECG/REVIEW INTERPRET ONLY: CPT | Performed by: INTERNAL MEDICINE

## 2018-06-19 PROCEDURE — 93270 REMOTE 30 DAY ECG REV/REPORT: CPT

## 2018-07-10 ENCOUNTER — TELEPHONE (OUTPATIENT)
Dept: CARDIOLOGY | Facility: CLINIC | Age: 83
End: 2018-07-10

## 2018-07-10 NOTE — TELEPHONE ENCOUNTER
Have strip from [facility] 7/2/18 showing sinus rhythm with atrial fibrillation  lasting less than 10 seconds. Per Cardiologist's note: the patient had a stroke in 02/2018.  She was seen at [facility].  She was treated with tPA.  I do not have all the data but it looks like they did an echocardiogram which is relatively unremarkable.  On MRI of the head including the carotid arteries, they do not make mention of any significant disease.  I am concerned that she is only on a baby aspirin now and the actual cause of this stroke was never found unless they just assumed it was platelet plaque from her mild carotid disease but given the history of possible SVT or atrial fib in the past, I have a very low threshold to placing her on a NOAC agent.  I am going to have her wear a 30-day [employer] event monitor and if we see anything that looks like atrial fib, I would suggest that we stop the aspirin and place her on a NOAC agent. Pt has F/U OV 7/25/18 with CB Martinez RN

## 2018-07-12 NOTE — TELEPHONE ENCOUNTER
Call out to Pt to discuss diagnosis of afib. Dr De Jesus would like Pt started on Eliquis or warfarin. Pt has had previous stroke. ISAAC Martinez RN

## 2018-07-13 ENCOUNTER — TELEPHONE (OUTPATIENT)
Dept: CARDIOLOGY | Facility: CLINIC | Age: 83
End: 2018-07-13

## 2018-07-13 NOTE — TELEPHONE ENCOUNTER
"RN called pt back in response to a voicemail left. RN discussed Dr. De Jesus's recommendation per Mendy \" Call out to Pt to discuss diagnosis of afib. Dr De Jesus would like Pt started on Eliquis or warfarin. Pt has had previous stroke\". RN discussed pt's diagnosis of Afib with patient. Pt to be seen by Gwyn Camara 7/25/18. Per Mendy -pt may need to be seen sooner by DANIELLE to discuss the options for anticoagulation. RN to discuss with Dr. De Jesus.     Per Dr. De Jesus-pt can wait to be seen on 7/25/18 by Gwyn Dawson to discuss anticoagulation options.   "

## 2018-07-25 ENCOUNTER — OFFICE VISIT (OUTPATIENT)
Dept: CARDIOLOGY | Facility: CLINIC | Age: 83
End: 2018-07-25
Payer: MEDICARE

## 2018-07-25 VITALS
BODY MASS INDEX: 21.17 KG/M2 | DIASTOLIC BLOOD PRESSURE: 60 MMHG | SYSTOLIC BLOOD PRESSURE: 120 MMHG | HEIGHT: 64 IN | HEART RATE: 68 BPM | WEIGHT: 124 LBS

## 2018-07-25 DIAGNOSIS — I20.1 PRINZMETAL'S ANGINA (H): ICD-10-CM

## 2018-07-25 DIAGNOSIS — I47.10 PAROXYSMAL SUPRAVENTRICULAR TACHYCARDIA (H): ICD-10-CM

## 2018-07-25 DIAGNOSIS — I48.0 PAROXYSMAL ATRIAL FIBRILLATION (H): Primary | ICD-10-CM

## 2018-07-25 DIAGNOSIS — I10 ESSENTIAL HYPERTENSION, BENIGN: ICD-10-CM

## 2018-07-25 PROCEDURE — 99214 OFFICE O/P EST MOD 30 MIN: CPT | Performed by: NURSE PRACTITIONER

## 2018-07-25 NOTE — MR AVS SNAPSHOT
After Visit Summary   7/25/2018    Ralph Whitten    MRN: [id number]           Patient Information     Date Of Birth          10/16/1930        Visit Information        Provider Department      7/25/2018 10:10 AM Gwyn Camara APRN CNP [facility]        Today's Diagnoses     Essential hypertension, benign    -  1    Prinzmetal's angina (H)        Paroxysmal supraventricular tachycardia (H)        Paroxysmal atrial fibrillation (H)          Care Instructions    Stop plavix    On Saturday start Eliquis 2.5mg twice daily    Call us or your primary MD if you want to switch to warfarin          Follow-ups after your visit        Additional Services     Follow-Up with Cardiac Advanced Practice Provider           Follow-Up with Cardiologist                 Future tests that were ordered for you today     Open Future Orders        Priority Expected Expires Ordered    Follow-Up with Cardiac Advanced Practice Provider Routine 10/4/2018 7/25/2019 7/25/2018    Follow-Up with Cardiologist Routine 5/22/2019 7/26/2019 7/25/2018            Who to contact     If you have questions or need follow up information about today's clinic visit or your schedule please contact [facility] directly at [phone].  Normal or non-critical lab and imaging results will be communicated to you by MyChart, letter or phone within 4 business days after the clinic has received the results. If you do not hear from us within 7 days, please contact the clinic through [employer] or phone. If you have a critical or abnormal lab result, we will notify you by phone as soon as possible.  Submit refill requests through Kony or call your pharmacy and they will forward the refill request to us. Please allow 3 business days for your refill to be completed.          Additional Information About Your Visit        MyChart Information     Kony lets you send  "messages to your doctor, view your test results, renew your prescriptions, schedule appointments and more. To sign up, go to [email or web address] . Click on \"Log in\" on the left side of the screen, which will take you to the Welcome page. Then click on \"Sign up Now\" on the right side of the page.     You will be asked to enter the access code listed below, as well as some personal information. Please follow the directions to create your username and password.     Your access code is: [email or web address]  Expires: 10/23/2018  9:52 AM     Your access code will  in 90 days. If you need help or a new code, please call your [facility] clinic or [phone].        Care EveryWhere ID     This is your Care EveryWhere ID. This could be used by other organizations to access your [facility] medical records  [id number]        Your Vitals Were     Pulse Height BMI (Body Mass Index)             68 1.613 m (5' 3.5\") 21.62 kg/m2          Blood Pressure from Last 3 Encounters:   18 120/60   18 134/68   17 132/60    Weight from Last 3 Encounters:   18 56.2 kg (124 lb)   18 56.7 kg (125 lb)   17 57.2 kg (126 lb)              We Performed the Following     Follow-Up with Cardiac Advanced Practice Provider          Today's Medication Changes          These changes are accurate as of 18 10:59 AM.  If you have any questions, ask your nurse or doctor.               Start taking these medicines.        Dose/Directions    apixaban ANTICOAGULANT 2.5 MG tablet   Commonly known as:  ELIQUIS   Used for:  Paroxysmal atrial fibrillation (H)   Started by:  Gwyn Camara APRN CNP        Dose:  2.5 mg   Take 1 tablet (2.5 mg) by mouth 2 times daily   Quantity:  60 tablet   Refills:  11         Stop taking these medicines if you haven't already. Please contact your care team if you have questions.     PLAVIX PO   Stopped by:  Gwyn Camara APRN CNP                Where to get your " medicines      These medications were sent to [facility] - [place], [place] - [address]  [address] [place] [address]     Phone:  [phone]     apixaban ANTICOAGULANT 2.5 MG tablet                Primary Care Provider Office Phone # Fax #    Chad Berry [phone]       [address]  [facility] [address]        Equal Access to Services     JOBY MCDANIEL : Hadii aad ku hadasho Soomaali, waaxda luqadaha, qaybta kaalmada adeegyada, waxay idiin hayaan adeeg kharash la'aan ah. So [facility] [phone].    ATENCIÓN: Si habla espjennyfer, tiene a singh disposición servicios gratuitos de asistencia lingüística. Adwoaame al [phone].    We comply with applicable federal civil rights laws and [place] laws. We do not discriminate on the basis of race, color, national origin, age, disability, sex, sexual orientation, or gender identity.            Thank you!     Thank you for choosing [facility]  for your care. Our goal is always to provide you with excellent care. Hearing back from our patients is one way we can continue to improve our services. Please take a few minutes to complete the written survey that you may receive in the mail after your visit with us. Thank you!             Your Updated Medication List - Protect others around you: Learn how to safely use, store and throw away your medicines at www.disposemymeds.org.          This list is accurate as of 7/25/18 10:59 AM.  Always use your most recent med list.                   Brand Name Dispense Instructions for use Diagnosis    apixaban ANTICOAGULANT 2.5 MG tablet    ELIQUIS    60 tablet    Take 1 tablet (2.5 mg) by mouth 2 times daily    Paroxysmal atrial fibrillation (H)       aspirin 81 MG tablet      Take 81 mg by mouth daily        celeXA 10 MG tablet   Generic drug:  citalopram      Take 10 mg by mouth daily        DAILY MULTIVITAMIN PO      Take by mouth daily         * diltiazem 240 MG 24 hr capsule      Take 240 mg by mouth daily        * diltiazem 60 MG tablet    CARDIZEM    10 tablet    Take 1 tablet (60 mg) by mouth as needed Take 1 tablet only as needed for increased palpitatons    Paroxysmal supraventricular tachycardia (H)       LORAZEPAM PO      Take 0.5 mg by mouth daily as needed        losartan 50 MG tablet    COZAAR    90 tablet    Take 1 tablet (50 mg) by mouth daily    Benign essential hypertension       metFORMIN 500 MG tablet    GLUCOPHAGE     Take 500 mg by mouth 2 times daily (with meals)        nitroGLYcerin 0.4 MG sublingual tablet    NITROSTAT     Place 0.4 mg under the tongue every 5 minutes as needed for chest pain For chest pain place 1 tablet under the tongue every 5 minutes for 3 doses. If symptoms persist 5 minutes after 1st dose call 911.        OMEPRAZOLE PO      Take 20 mg by mouth        pravastatin 40 MG tablet    PRAVACHOL    90 tablet    Take 1 tablet (40 mg) by mouth At Bedtime    Prinzmetal angina (H)       SYNTHROID 75 MCG tablet   Generic drug:  levothyroxine      Take 75 mcg by mouth daily        VITAMIN D3 PO      Take by mouth daily        * Notice:  This list has 2 medication(s) that are the same as other medications prescribed for you. Read the directions carefully, and ask your doctor or other care provider to review them with you.

## 2018-07-25 NOTE — LETTER
7/25/2018    NOLBERTO DURON  [address]  [facility] [address]    RE: Ralph Whitten       Dear Colleague,    I had the pleasure of seeing Ralph Whitten in the [facility].    History of Present Illness:     Ralph Whitten is a 86 year old female followed here by Dr. De Jesus. She is one of her most delightful patients. She returns today to follow-up on her event recorder which actually has now shown episodes of paroxysmal atrial fibrillation.  Interestingly she had a CVA in February of this year which started with dizziness.  When she got to the emergency room she had right leg weakness and was given lytic therapy.  It was felt that this was due to small vessel ischemic disease.  But now that we have seen paroxysmal  atrial fibrillation it certainly possible it could have been from an arrhythmia that was undetected.     She also has Prinzmetal's angina but her chest discomfort has been quite stable lately.  Her shoulder pain improved with an injection.      She had an invasive angiogram in 2000 showing no significant coronary artery disease but confirmed diagnosis of Prinzmetal angina. She has done well on diltiazem. Stress testing in 2009 was unremarkable for ischemia. Based on her change in symptoms of left shoulder pain she underwent a CT coronary in May of 2017 showing a 25-40% stenosis in the LAD, 25% proximal LAD with poor visualization of the distal LAD. The circumflex and right coronary had no stenosis. Total calcium score was 31. There was an incidental finding of a 5 mm right lower lung lower lobe lung nodule; repeat scan was done in May and showed a few new small nodules.  She does not likely meet criteria to have these rescreened and we sent the primary care physician these results to follow-up.  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     She has a history of paroxysmal atrial tachycardia.  Recent event recorder  documented self-limited episodes of paroxysmal atrial fibrillation.  Her [id number] score is 6 if you do not include vascular disease.  However she has an LAD lesion of approximately for 40% by CT angiogram so her [id number] could be 7 which gives her an 9% plus risk of stroke without higher level anticoagulation.  I have instructed her to come off of her omega-3 as we start Eliquis.  We will also stop her Plavix that was started after her stroke.  I have price checked Eliquis and this seems to be affordable for her.  If not, she would gladly switch to warfarin which we would have primary care manage.     She has significant social stressors in her life. 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 This is limited their activity, socialization, and caused stress and anxiety for her.  She recently sold her house and is moving to a senior building that has the ability to transition to assisted living or memory care.     Blood pressures well controlled after increase in losartan.   She has no other complaints today and is not aware of her atrial fibrillation.        Impression/Plan:      1. Prinzmetal angina currently under good control. We had considered an invasive coronary angiogram but her shoulder pain has responded to an injection of steroids and physical therapy via the orthopedic clinic. We will continue her medical management.  2. Hypertension improved control continue losartan 50 mg per day.   3. Situational depression on Celexa--this is likely in part due to the stress being a caregiver for her ; this medication adds an additional blood thinner effect.  4.Parosxymal atrial fibrillation-asymptomatic with elevated [id number] score. Given age and weight, start Eliquis 2.5mg twice daily; if this becomes unaffordable, she would  be willing to change to Warfarin and have this managed at PMD as he  takes warfarin and goes to the INR clinic there.  5. Dyslipidemia treated with LDL 73--continue pravastatin 40 mg day.    Follow up with me in the fall and Dr. De Jesus in the spring.    As, always it has been pleasure.  Greater than 50% of this 30 minute visit was spent in counseling    Gwyn Camara, MSN, APRN-BC, CNP  Cardiology    Orders Placed This Encounter   Procedures     Follow-Up with Cardiologist     Follow-Up with Cardiac Advanced Practice Provider     Orders Placed This Encounter   Medications     DISCONTD: Clopidogrel Bisulfate (PLAVIX PO)     Sig: Take 75 mg by mouth daily     DISCONTD: apixaban ANTICOAGULANT (ELIQUIS) 2.5 MG tablet     Sig: Take 1 tablet (2.5 mg) by mouth 2 times daily     Dispense:  60 tablet     Refill:  3     apixaban ANTICOAGULANT (ELIQUIS) 2.5 MG tablet     Sig: Take 1 tablet (2.5 mg) by mouth 2 times daily     Dispense:  60 tablet     Refill:  11     Added more refills     Medications Discontinued During This Encounter   Medication Reason     apixaban ANTICOAGULANT (ELIQUIS) 2.5 MG tablet Reorder     Clopidogrel Bisulfate (PLAVIX PO)          Encounter Diagnoses   Name Primary?     Prinzmetal's angina (H)      Paroxysmal supraventricular tachycardia (H)      Essential hypertension, benign Yes     Paroxysmal atrial fibrillation (H)        CURRENT MEDICATIONS:  Current Outpatient Prescriptions   Medication Sig Dispense Refill     apixaban ANTICOAGULANT (ELIQUIS) 2.5 MG tablet Take 1 tablet (2.5 mg) by mouth 2 times daily 60 tablet 11     aspirin 81 MG tablet Take 81 mg by mouth daily       Cholecalciferol (VITAMIN D3 PO) Take by mouth daily       citalopram (CELEXA) 10 MG tablet Take 10 mg by mouth daily       diltiazem 240 MG 24 hr ER capsule Take 240 mg by mouth daily       levothyroxine (SYNTHROID) 75 MCG tablet Take 75 mcg by mouth daily       LORAZEPAM PO Take 0.5 mg by mouth daily as needed         losartan (COZAAR) 50 MG tablet Take 1 tablet (50 mg) by mouth daily 90 tablet 3     metFORMIN (GLUCOPHAGE) 500 MG tablet Take 500 mg by mouth 2 times daily (with meals)       Multiple Vitamin (DAILY MULTIVITAMIN PO) Take by mouth daily        nitroglycerin (NITROSTAT) 0.4 MG sublingual tablet Place 0.4 mg under the tongue every 5 minutes as needed for chest pain For chest pain place 1 tablet under the tongue every 5 minutes for 3 doses. If symptoms persist 5 minutes after 1st dose call 911.       OMEPRAZOLE PO Take 20 mg by mouth       pravastatin (PRAVACHOL) 40 MG tablet Take 1 tablet (40 mg) by mouth At Bedtime 90 tablet 3     diltiazem (CARDIZEM) 60 MG tablet Take 1 tablet (60 mg) by mouth as needed Take 1 tablet only as needed for increased palpitatons (Patient not taking: Reported on 7/25/2018) 10 tablet 2       ALLERGIES     Allergies   Allergen Reactions     Diovan [Valsartan] Other (See Comments) and GI Disturbance     Arthralgia     Hmg-Coa-R Inhibitors Other (See Comments)     Statin Medications give patient Myalgias---simvastatin     Sulfa Drugs Rash       PAST MEDICAL HISTORY:  Past Medical History:   Diagnosis Date     CVA (cerebral vascular accident) (H) 02/2018    ANW-details unk, treated with t-pa (dizziness presenting sx)     Degenerative disk disease     C 6/7     Diabetes mellitus (H)      Diabetic neuropathy (H)      Essential hypertension, benign      Generalized osteoarthrosis, unspecified site (aka ARTHRITIS)     osteoporosis and osteoarthritis     Hyperlipidemia      Hypothyroid      Mitral valve disorder     mild/mod     PAT (paroxysmal atrial tachycardia) (H)      Prinzmetal angina (H)     no cad 2000     PUD (peptic ulcer disease)      Rheumatic fever      Tricuspid regurgitation     mod     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       PAST SURGICAL HISTORY:  Past Surgical History:   Procedure Laterality Date     APPENDECTOMY       C RAD RESEC  "TONSIL/PILLARS       CORONARY ANGIOGRAPHY ADULT ORDER  2002    normal Coronary arteries, Vasospasms     HYSTERECTOMY      total     rotator cuff surgery      right     TONSILLECTOMY         FAMILY HISTORY:  Family History   Problem Relation Age of Onset     Coronary Artery Disease Mother      Myocardial Infarction Mother      Heart Failure Father      Unknown/Adopted Maternal Grandmother      Unknown/Adopted Maternal Grandfather      Unknown/Adopted Paternal Grandmother      Unknown/Adopted Paternal Grandfather        SOCIAL HISTORY:  Social History     Social History     Marital status:      Spouse name: N/A     Number of children: N/A     Years of education: N/A     Social History Main Topics     Smoking status: Never Smoker     Smokeless tobacco: Never Used     Alcohol use Yes      Comment: occ. wine     Drug use: None     Sexual activity: Not Asked     Other Topics Concern     Parent/Sibling W/ Cabg, Mi Or Angioplasty Before 65f 55m? No     Caffeine Concern No     coffee: 3-4 cups a day     Sleep Concern No     Stress Concern No     Weight Concern No     Special Diet Yes     diabetic diet, healthy     Exercise Yes     walking x3 a week     Seat Belt Yes     Social History Narrative       Review of Systems:  Skin:  Positive for bruising     Eyes:  Positive for glasses    ENT:  Negative      Respiratory:  Negative       Cardiovascular:    Positive for;palpitations    Gastroenterology:        Genitourinary:  not assessed      Musculoskeletal:  Positive for joint pain;arthritis;joint swelling    Neurologic:  Positive for headaches;numbness or tingling of feet;numbness or tingling of hands;stroke    Psychiatric:  Negative      Heme/Lymph/Imm:  Positive for allergies;easy bruising    Endocrine:  Positive for thyroid disorder;diabetes      Physical Exam:  Vitals: /60  Pulse 68  Ht 1.613 m (5' 3.5\")  Wt 56.2 kg (124 lb)  BMI 21.62 kg/m2    Constitutional:  cooperative, alert and oriented, well " developed, well nourished, in no acute distress appears younger than stated age      Skin:  warm and dry to the touch, no apparent skin lesions or masses noted          Head:  normocephalic, no masses or lesions        Eyes:  pupils equal and round, conjunctivae and lids unremarkable, sclera white, no xanthalasma, EOMS intact, no nystagmus        Lymph:No Cervical lymphadenopathy present     ENT:  no pallor or cyanosis, dentition good        Neck:  carotid pulses are full and equal bilaterally, JVP normal, no carotid bruit        Respiratory:  normal breath sounds, clear to auscultation, normal A-P diameter, normal symmetry, normal respiratory excursion, no use of accessory muscles         Cardiac: regular rhythm, normal S1/S2, no S3 or S4, apical impulse not displaced, no murmurs, gallops or rubs       grade 1;systolic murmur     minimal syst murmru at apex ( echo mild mr, mild+ tr)  pulses full and equal, no bruits auscultated                                        GI:  abdomen soft, non-tender, BS normoactive, no mass, no HSM, no bruits        Extremities and Muscular Skeletal:  no deformities, clubbing, cyanosis, erythema observed;no edema   bilateral LE edema;trace;R greater than L          Neurological:  no gross motor deficits        Psych:  Alert and Oriented x 3      Recent Lab Results:  LIPID RESULTS:  Lab Results   Component Value Date    CHOL 155 06/18/2018    HDL 69 06/18/2018    LDL 73 06/18/2018    TRIG 63 06/18/2018    CHOLHDLRATIO 3.1 01/12/2011       LIVER ENZYME RESULTS:  Lab Results   Component Value Date    AST 23 04/22/2016    ALT <5 (L) 04/18/2017       CBC RESULTS:  Lab Results   Component Value Date    WBC 7.0 03/31/2015    RBC 3.97 03/31/2015    HGB 12.1 03/31/2015    HCT 36.7 03/31/2015    MCV 92 03/31/2015    MCH 30.5 03/31/2015    MCHC 33 03/31/2015    RDW 13.2 03/31/2015     03/31/2015       BMP RESULTS:  Lab Results   Component Value Date     06/18/2018    POTASSIUM 4.7  06/18/2018    CHLORIDE 103 06/18/2018    CO2 24 06/18/2018    ANIONGAP 13.7 06/18/2018     (H) 06/18/2018    BUN 24 06/18/2018    CR 1.11 06/18/2018    GFRESTIMATED 46 (L) 06/18/2018    GFRESTBLACK 56 (L) 06/18/2018    CHUN 10.1 06/18/2018        A1C RESULTS:  Lab Results   Component Value Date    A1C 6.9 (A) 04/22/2016       INR RESULTS:  No results found for: INR      Thank you for allowing me to participate in the care of your patient.    Sincerely,     ANDREZ Arcos [facility]

## 2018-07-25 NOTE — PROGRESS NOTES
History of Present Illness:     Ralph Whitten is a 86 year old female followed here by Dr. De Jesus. She is one of her most delightful patients. She returns today to follow-up on her event recorder which actually has now shown episodes of paroxysmal atrial fibrillation.  Interestingly she had a CVA in February of this year which started with dizziness.  When she got to the emergency room she had right leg weakness and was given lytic therapy.  It was felt that this was due to small vessel ischemic disease.  But now that we have seen paroxysmal  atrial fibrillation it certainly possible it could have been from an arrhythmia that was undetected.     She also has Prinzmetal's angina but her chest discomfort has been quite stable lately.  Her shoulder pain improved with an injection.      She had an invasive angiogram in 2000 showing no significant coronary artery disease but confirmed diagnosis of Prinzmetal angina. She has done well on diltiazem. Stress testing in 2009 was unremarkable for ischemia. Based on her change in symptoms of left shoulder pain she underwent a CT coronary in May of 2017 showing a 25-40% stenosis in the LAD, 25% proximal LAD with poor visualization of the distal LAD. The circumflex and right coronary had no stenosis. Total calcium score was 31. There was an incidental finding of a 5 mm right lower lung lower lobe lung nodule; repeat scan was done in May and showed a few new small nodules.  She does not likely meet criteria to have these rescreened and we sent the primary care physician these results to follow-up.  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     She has a history of paroxysmal atrial tachycardia.  Recent event recorder documented self-limited episodes of paroxysmal atrial fibrillation.  Her [id number] score is 6 if you do not include vascular disease.  However she has an LAD lesion of approximately for 40% by CT angiogram so her [id number] could be 7  which gives her an 9% plus risk of stroke without higher level anticoagulation.  I have instructed her to come off of her omega-3 as we start Eliquis.  We will also stop her Plavix that was started after her stroke.  I have price checked Eliquis and this seems to be affordable for her.  If not, she would gladly switch to warfarin which we would have primary care manage.     She has significant social stressors in her life. 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 This is limited their activity, socialization, and caused stress and anxiety for her.  She recently sold her house and is moving to a senior building that has the ability to transition to assisted living or memory care.     Blood pressures well controlled after increase in losartan.   She has no other complaints today and is not aware of her atrial fibrillation.        Impression/Plan:      1. Prinzmetal angina currently under good control. We had considered an invasive coronary angiogram but her shoulder pain has responded to an injection of steroids and physical therapy via the orthopedic clinic. We will continue her medical management.  2. Hypertension improved control continue losartan 50 mg per day.   3. Situational depression on Celexa--this is likely in part due to the stress being a caregiver for her ; this medication adds an additional blood thinner effect.  4.Parosxymal atrial fibrillation-asymptomatic with elevated [id number] score. Given age and weight, start Eliquis 2.5mg twice daily; if this becomes unaffordable, she would be willing to change to Warfarin and have this managed at [facility] as he  takes warfarin and goes to the INR clinic there.  5. Dyslipidemia treated with LDL 73--continue pravastatin 40 mg day.    Follow up with me in the fall and   [place] in the spring.    As, always it has been pleasure.  Greater than 50% of this 30 minute visit was spent in counseling    Gwyn Camara, MSN, APRN-BC, CNP  Cardiology    Orders Placed This Encounter   Procedures     Follow-Up with Cardiologist     Follow-Up with Cardiac Advanced Practice Provider     Orders Placed This Encounter   Medications     DISCONTD: Clopidogrel Bisulfate (PLAVIX PO)     Sig: Take 75 mg by mouth daily     DISCONTD: apixaban ANTICOAGULANT (ELIQUIS) 2.5 MG tablet     Sig: Take 1 tablet (2.5 mg) by mouth 2 times daily     Dispense:  60 tablet     Refill:  3     apixaban ANTICOAGULANT (ELIQUIS) 2.5 MG tablet     Sig: Take 1 tablet (2.5 mg) by mouth 2 times daily     Dispense:  60 tablet     Refill:  11     Added more refills     Medications Discontinued During This Encounter   Medication Reason     apixaban ANTICOAGULANT (ELIQUIS) 2.5 MG tablet Reorder     Clopidogrel Bisulfate (PLAVIX PO)          Encounter Diagnoses   Name Primary?     Prinzmetal's angina (H)      Paroxysmal supraventricular tachycardia (H)      Essential hypertension, benign Yes     Paroxysmal atrial fibrillation (H)        CURRENT MEDICATIONS:  Current Outpatient Prescriptions   Medication Sig Dispense Refill     apixaban ANTICOAGULANT (ELIQUIS) 2.5 MG tablet Take 1 tablet (2.5 mg) by mouth 2 times daily 60 tablet 11     aspirin 81 MG tablet Take 81 mg by mouth daily       Cholecalciferol (VITAMIN D3 PO) Take by mouth daily       citalopram (CELEXA) 10 MG tablet Take 10 mg by mouth daily       diltiazem 240 MG 24 hr ER capsule Take 240 mg by mouth daily       levothyroxine (SYNTHROID) 75 MCG tablet Take 75 mcg by mouth daily       LORAZEPAM PO Take 0.5 mg by mouth daily as needed        losartan (COZAAR) 50 MG tablet Take 1 tablet (50 mg) by mouth daily 90 tablet 3     metFORMIN (GLUCOPHAGE) 500 MG tablet Take 500 mg by mouth 2 times daily (with meals)       Multiple Vitamin (DAILY MULTIVITAMIN PO) Take by mouth  daily        nitroglycerin (NITROSTAT) 0.4 MG sublingual tablet Place 0.4 mg under the tongue every 5 minutes as needed for chest pain For chest pain place 1 tablet under the tongue every 5 minutes for 3 doses. If symptoms persist 5 minutes after 1st dose call 911.       OMEPRAZOLE PO Take 20 mg by mouth       pravastatin (PRAVACHOL) 40 MG tablet Take 1 tablet (40 mg) by mouth At Bedtime 90 tablet 3     diltiazem (CARDIZEM) 60 MG tablet Take 1 tablet (60 mg) by mouth as needed Take 1 tablet only as needed for increased palpitatons (Patient not taking: Reported on 7/25/2018) 10 tablet 2       ALLERGIES     Allergies   Allergen Reactions     Diovan [Valsartan] Other (See Comments) and GI Disturbance     Arthralgia     Hmg-Coa-R Inhibitors Other (See Comments)     Statin Medications give patient Myalgias---simvastatin     Sulfa Drugs Rash       PAST MEDICAL HISTORY:  Past Medical History:   Diagnosis Date     CVA (cerebral vascular accident) (H) 02/2018    ANW-details unk, treated with t-pa (dizziness presenting sx)     Degenerative disk disease     C 6/7     Diabetes mellitus (H)      Diabetic neuropathy (H)      Essential hypertension, benign      Generalized osteoarthrosis, unspecified site (aka ARTHRITIS)     osteoporosis and osteoarthritis     Hyperlipidemia      Hypothyroid      Mitral valve disorder     mild/mod     PAT (paroxysmal atrial tachycardia) (H)      Prinzmetal angina (H)     no cad 2000     PUD (peptic ulcer disease)      Rheumatic fever      Tricuspid regurgitation     mod     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       PAST SURGICAL HISTORY:  Past Surgical History:   Procedure Laterality Date     APPENDECTOMY       C RAD RESEC TONSIL/PILLARS       CORONARY ANGIOGRAPHY ADULT ORDER  2002    normal Coronary arteries, Vasospasms     HYSTERECTOMY      total     rotator cuff surgery      right     TONSILLECTOMY         FAMILY HISTORY:  Family History   Problem  "Relation Age of Onset     Coronary Artery Disease Mother      Myocardial Infarction Mother      Heart Failure Father      Unknown/Adopted Maternal Grandmother      Unknown/Adopted Maternal Grandfather      Unknown/Adopted Paternal Grandmother      Unknown/Adopted Paternal Grandfather        SOCIAL HISTORY:  Social History     Social History     Marital status:      Spouse name: N/A     Number of children: N/A     Years of education: N/A     Social History Main Topics     Smoking status: Never Smoker     Smokeless tobacco: Never Used     Alcohol use Yes      Comment: occ. wine     Drug use: None     Sexual activity: Not Asked     Other Topics Concern     Parent/Sibling W/ Cabg, Mi Or Angioplasty Before 65f 55m? No     Caffeine Concern No     coffee: 3-4 cups a day     Sleep Concern No     Stress Concern No     Weight Concern No     Special Diet Yes     diabetic diet, healthy     Exercise Yes     walking x3 a week     Seat Belt Yes     Social History Narrative       Review of Systems:  Skin:  Positive for bruising     Eyes:  Positive for glasses    ENT:  Negative      Respiratory:  Negative       Cardiovascular:    Positive for;palpitations    Gastroenterology:        Genitourinary:  not assessed      Musculoskeletal:  Positive for joint pain;arthritis;joint swelling    Neurologic:  Positive for headaches;numbness or tingling of feet;numbness or tingling of hands;stroke    Psychiatric:  Negative      Heme/Lymph/Imm:  Positive for allergies;easy bruising    Endocrine:  Positive for thyroid disorder;diabetes      Physical Exam:  Vitals: /60  Pulse 68  Ht 1.613 m (5' 3.5\")  Wt 56.2 kg (124 lb)  BMI 21.62 kg/m2    Constitutional:  cooperative, alert and oriented, well developed, well nourished, in no acute distress appears younger than stated age      Skin:  warm and dry to the touch, no apparent skin lesions or masses noted          Head:  normocephalic, no masses or lesions        Eyes:  pupils equal " and round, conjunctivae and lids unremarkable, sclera white, no xanthalasma, EOMS intact, no nystagmus        Lymph:No Cervical lymphadenopathy present     ENT:  no pallor or cyanosis, dentition good        Neck:  carotid pulses are full and equal bilaterally, JVP normal, no carotid bruit        Respiratory:  normal breath sounds, clear to auscultation, normal A-P diameter, normal symmetry, normal respiratory excursion, no use of accessory muscles         Cardiac: regular rhythm, normal S1/S2, no S3 or S4, apical impulse not displaced, no murmurs, gallops or rubs       grade 1;systolic murmur     minimal syst murmru at apex ( echo mild mr, mild+ tr)  pulses full and equal, no bruits auscultated                                        GI:  abdomen soft, non-tender, BS normoactive, no mass, no HSM, no bruits        Extremities and Muscular Skeletal:  no deformities, clubbing, cyanosis, erythema observed;no edema   bilateral LE edema;trace;R greater than L          Neurological:  no gross motor deficits        Psych:  Alert and Oriented x 3      Recent Lab Results:  LIPID RESULTS:  Lab Results   Component Value Date    CHOL 155 06/18/2018    HDL 69 06/18/2018    LDL 73 06/18/2018    TRIG 63 06/18/2018    CHOLHDLRATIO 3.1 01/12/2011       LIVER ENZYME RESULTS:  Lab Results   Component Value Date    AST 23 04/22/2016    ALT <5 (L) 04/18/2017       CBC RESULTS:  Lab Results   Component Value Date    WBC 7.0 03/31/2015    RBC 3.97 03/31/2015    HGB 12.1 03/31/2015    HCT 36.7 03/31/2015    MCV 92 03/31/2015    MCH 30.5 03/31/2015    MCHC 33 03/31/2015    RDW 13.2 03/31/2015     03/31/2015       BMP RESULTS:  Lab Results   Component Value Date     06/18/2018    POTASSIUM 4.7 06/18/2018    CHLORIDE 103 06/18/2018    CO2 24 06/18/2018    ANIONGAP 13.7 06/18/2018     (H) 06/18/2018    BUN 24 06/18/2018    CR 1.11 06/18/2018    GFRESTIMATED 46 (L) 06/18/2018    GFRESTBLACK 56 (L) 06/18/2018    CHUN 10.1  06/18/2018        A1C RESULTS:  Lab Results   Component Value Date    A1C 6.9 (A) 04/22/2016       INR RESULTS:  No results found for: INR        CC  Nnamdi De Jesus MD  [id number]  [place] [id number]

## 2018-07-25 NOTE — PATIENT INSTRUCTIONS
Stop plavix    On Saturday start Eliquis 2.5mg twice daily    Call us or your primary MD if you want to switch to warfarin

## 2018-07-25 NOTE — LETTER
7/25/2018    NOLBEROT DURON  [address]  [facility] [address]    RE: Ralph Whitten       Dear Colleague,    I had the pleasure of seeing Ralph Whitten in the [facility].    History of Present Illness:     Ralph Whitten is a 86 year old female followed here by Dr. De Jesus. She is one of her most delightful patients. She returns today to follow-up on her event recorder which actually has now shown episodes of paroxysmal atrial fibrillation.  Interestingly she had a CVA in February of this year which started with dizziness.  When she got to the emergency room she had right leg weakness and was given lytic therapy.  It was felt that this was due to small vessel ischemic disease.  But now that we have seen paroxysmal  atrial fibrillation it certainly possible it could have been from an arrhythmia that was undetected.     She also has Prinzmetal's angina but her chest discomfort has been quite stable lately.  Her shoulder pain improved with an injection.      She had an invasive angiogram in 2000 showing no significant coronary artery disease but confirmed diagnosis of Prinzmetal angina. She has done well on diltiazem. Stress testing in 2009 was unremarkable for ischemia. Based on her change in symptoms of left shoulder pain she underwent a CT coronary in May of 2017 showing a 25-40% stenosis in the LAD, 25% proximal LAD with poor visualization of the distal LAD. The circumflex and right coronary had no stenosis. Total calcium score was 31. There was an incidental finding of a 5 mm right lower lung lower lobe lung nodule; repeat scan was done in May and showed a few new small nodules.  She does not likely meet criteria to have these rescreened and we sent the primary care physician these results to follow-up.  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     She has a history of paroxysmal atrial tachycardia.  Recent event recorder  documented self-limited episodes of paroxysmal atrial fibrillation.  Her [id number] score is 6 if you do not include vascular disease.  However she has an LAD lesion of approximately for 40% by CT angiogram so her [id number] could be 7 which gives her an 9% plus risk of stroke without higher level anticoagulation.  I have instructed her to come off of her omega-3 as we start Eliquis.  We will also stop her Plavix that was started after her stroke.  I have price checked Eliquis and this seems to be affordable for her.  If not, she would gladly switch to warfarin which we would have primary care manage.     She has significant social stressors in her life. 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 This is limited their activity, socialization, and caused stress and anxiety for her.  She recently sold her house and is moving to a senior building that has the ability to transition to assisted living or memory care.     Blood pressures well controlled after increase in losartan.   She has no other complaints today and is not aware of her atrial fibrillation.        Impression/Plan:      1. Prinzmetal angina currently under good control. We had considered an invasive coronary angiogram but her shoulder pain has responded to an injection of steroids and physical therapy via the orthopedic clinic. We will continue her medical management.  2. Hypertension improved control continue losartan 50 mg per day.   3. Situational depression on Celexa--this is likely in part due to the stress being a caregiver for her ; this medication adds an additional blood thinner effect.  4.Parosxymal atrial fibrillation-asymptomatic with elevated [id number] score. Given age and weight, start Eliquis 2.5mg twice daily; if this becomes unaffordable, she would  be willing to change to Warfarin and have this managed at [facility]D as he  takes warfarin and goes to the INR clinic there.  5. Dyslipidemia treated with LDL 73--continue pravastatin 40 mg day.    Follow up with me in the fall and Dr. De Jesus in the spring.    As, always it has been pleasure.  Greater than 50% of this 30 minute visit was spent in counseling    Gwyn Camara, MSN, APRN-BC, CNP  Cardiology    Orders Placed This Encounter   Procedures     Follow-Up with Cardiologist     Follow-Up with Cardiac Advanced Practice Provider     Orders Placed This Encounter   Medications     DISCONTD: Clopidogrel Bisulfate (PLAVIX PO)     Sig: Take 75 mg by mouth daily     DISCONTD: apixaban ANTICOAGULANT (ELIQUIS) 2.5 MG tablet     Sig: Take 1 tablet (2.5 mg) by mouth 2 times daily     Dispense:  60 tablet     Refill:  3     apixaban ANTICOAGULANT (ELIQUIS) 2.5 MG tablet     Sig: Take 1 tablet (2.5 mg) by mouth 2 times daily     Dispense:  60 tablet     Refill:  11     Added more refills     Medications Discontinued During This Encounter   Medication Reason     apixaban ANTICOAGULANT (ELIQUIS) 2.5 MG tablet Reorder     Clopidogrel Bisulfate (PLAVIX PO)          Encounter Diagnoses   Name Primary?     Prinzmetal's angina (H)      Paroxysmal supraventricular tachycardia (H)      Essential hypertension, benign Yes     Paroxysmal atrial fibrillation (H)        CURRENT MEDICATIONS:  Current Outpatient Prescriptions   Medication Sig Dispense Refill     apixaban ANTICOAGULANT (ELIQUIS) 2.5 MG tablet Take 1 tablet (2.5 mg) by mouth 2 times daily 60 tablet 11     aspirin 81 MG tablet Take 81 mg by mouth daily       Cholecalciferol (VITAMIN D3 PO) Take by mouth daily       citalopram (CELEXA) 10 MG tablet Take 10 mg by mouth daily       diltiazem 240 MG 24 hr ER capsule Take 240 mg by mouth daily       levothyroxine (SYNTHROID) 75 MCG tablet Take 75 mcg by mouth daily       LORAZEPAM PO Take 0.5 mg by mouth daily as needed         losartan (COZAAR) 50 MG tablet Take 1 tablet (50 mg) by mouth daily 90 tablet 3     metFORMIN (GLUCOPHAGE) 500 MG tablet Take 500 mg by mouth 2 times daily (with meals)       Multiple Vitamin (DAILY MULTIVITAMIN PO) Take by mouth daily        nitroglycerin (NITROSTAT) 0.4 MG sublingual tablet Place 0.4 mg under the tongue every 5 minutes as needed for chest pain For chest pain place 1 tablet under the tongue every 5 minutes for 3 doses. If symptoms persist 5 minutes after 1st dose call 911.       OMEPRAZOLE PO Take 20 mg by mouth       pravastatin (PRAVACHOL) 40 MG tablet Take 1 tablet (40 mg) by mouth At Bedtime 90 tablet 3     diltiazem (CARDIZEM) 60 MG tablet Take 1 tablet (60 mg) by mouth as needed Take 1 tablet only as needed for increased palpitatons (Patient not taking: Reported on 7/25/2018) 10 tablet 2       ALLERGIES     Allergies   Allergen Reactions     Diovan [Valsartan] Other (See Comments) and GI Disturbance     Arthralgia     Hmg-Coa-R Inhibitors Other (See Comments)     Statin Medications give patient Myalgias---simvastatin     Sulfa Drugs Rash       PAST MEDICAL HISTORY:  Past Medical History:   Diagnosis Date     CVA (cerebral vascular accident) (H) 02/2018    ANW-details unk, treated with t-pa (dizziness presenting sx)     Degenerative disk disease     C 6/7     Diabetes mellitus (H)      Diabetic neuropathy (H)      Essential hypertension, benign      Generalized osteoarthrosis, unspecified site (aka ARTHRITIS)     osteoporosis and osteoarthritis     Hyperlipidemia      Hypothyroid      Mitral valve disorder     mild/mod     PAT (paroxysmal atrial tachycardia) (H)      Prinzmetal angina (H)     no cad 2000     PUD (peptic ulcer disease)      Rheumatic fever      Tricuspid regurgitation     mod     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       PAST SURGICAL HISTORY:  Past Surgical History:   Procedure Laterality Date     APPENDECTOMY       C RAD RESEC  "TONSIL/PILLARS       CORONARY ANGIOGRAPHY ADULT ORDER  2002    normal Coronary arteries, Vasospasms     HYSTERECTOMY      total     rotator cuff surgery      right     TONSILLECTOMY         FAMILY HISTORY:  Family History   Problem Relation Age of Onset     Coronary Artery Disease Mother      Myocardial Infarction Mother      Heart Failure Father      Unknown/Adopted Maternal Grandmother      Unknown/Adopted Maternal Grandfather      Unknown/Adopted Paternal Grandmother      Unknown/Adopted Paternal Grandfather        SOCIAL HISTORY:  Social History     Social History     Marital status:      Spouse name: N/A     Number of children: N/A     Years of education: N/A     Social History Main Topics     Smoking status: Never Smoker     Smokeless tobacco: Never Used     Alcohol use Yes      Comment: occ. wine     Drug use: None     Sexual activity: Not Asked     Other Topics Concern     Parent/Sibling W/ Cabg, Mi Or Angioplasty Before 65f 55m? No     Caffeine Concern No     coffee: 3-4 cups a day     Sleep Concern No     Stress Concern No     Weight Concern No     Special Diet Yes     diabetic diet, healthy     Exercise Yes     walking x3 a week     Seat Belt Yes     Social History Narrative       Review of Systems:  Skin:  Positive for bruising     Eyes:  Positive for glasses    ENT:  Negative      Respiratory:  Negative       Cardiovascular:    Positive for;palpitations    Gastroenterology:        Genitourinary:  not assessed      Musculoskeletal:  Positive for joint pain;arthritis;joint swelling    Neurologic:  Positive for headaches;numbness or tingling of feet;numbness or tingling of hands;stroke    Psychiatric:  Negative      Heme/Lymph/Imm:  Positive for allergies;easy bruising    Endocrine:  Positive for thyroid disorder;diabetes      Physical Exam:  Vitals: /60  Pulse 68  Ht 1.613 m (5' 3.5\")  Wt 56.2 kg (124 lb)  BMI 21.62 kg/m2    Constitutional:  cooperative, alert and oriented, well " developed, well nourished, in no acute distress appears younger than stated age      Skin:  warm and dry to the touch, no apparent skin lesions or masses noted          Head:  normocephalic, no masses or lesions        Eyes:  pupils equal and round, conjunctivae and lids unremarkable, sclera white, no xanthalasma, EOMS intact, no nystagmus        Lymph:No Cervical lymphadenopathy present     ENT:  no pallor or cyanosis, dentition good        Neck:  carotid pulses are full and equal bilaterally, JVP normal, no carotid bruit        Respiratory:  normal breath sounds, clear to auscultation, normal A-P diameter, normal symmetry, normal respiratory excursion, no use of accessory muscles         Cardiac: regular rhythm, normal S1/S2, no S3 or S4, apical impulse not displaced, no murmurs, gallops or rubs       grade 1;systolic murmur     minimal syst murmru at apex ( echo mild mr, mild+ tr)  pulses full and equal, no bruits auscultated                                        GI:  abdomen soft, non-tender, BS normoactive, no mass, no HSM, no bruits        Extremities and Muscular Skeletal:  no deformities, clubbing, cyanosis, erythema observed;no edema   bilateral LE edema;trace;R greater than L          Neurological:  no gross motor deficits        Psych:  Alert and Oriented x 3      Recent Lab Results:  LIPID RESULTS:  Lab Results   Component Value Date    CHOL 155 06/18/2018    HDL 69 06/18/2018    LDL 73 06/18/2018    TRIG 63 06/18/2018    CHOLHDLRATIO 3.1 01/12/2011       LIVER ENZYME RESULTS:  Lab Results   Component Value Date    AST 23 04/22/2016    ALT <5 (L) 04/18/2017       CBC RESULTS:  Lab Results   Component Value Date    WBC 7.0 03/31/2015    RBC 3.97 03/31/2015    HGB 12.1 03/31/2015    HCT 36.7 03/31/2015    MCV 92 03/31/2015    MCH 30.5 03/31/2015    MCHC 33 03/31/2015    RDW 13.2 03/31/2015     03/31/2015       BMP RESULTS:  Lab Results   Component Value Date     06/18/2018    POTASSIUM 4.7  06/18/2018    CHLORIDE 103 06/18/2018    CO2 24 06/18/2018    ANIONGAP 13.7 06/18/2018     (H) 06/18/2018    BUN 24 06/18/2018    CR 1.11 06/18/2018    GFRESTIMATED 46 (L) 06/18/2018    GFRESTBLACK 56 (L) 06/18/2018    CHUN 10.1 06/18/2018        A1C RESULTS:  Lab Results   Component Value Date    A1C 6.9 (A) 04/22/2016       INR RESULTS:  No results found for: INR        CC  Nnamdi De Jesus MD  [address]  [place] [id number]                  Thank you for allowing me to participate in the care of your patient.      Sincerely,     ANDREZ Arcos CNP     [facility]    cc:   Nnamdi De Jesus MD  [address]  [place] [id number]

## 2018-10-02 ENCOUNTER — DOCUMENTATION ONLY (OUTPATIENT)
Dept: CARDIOLOGY | Facility: CLINIC | Age: 83
End: 2018-10-02

## 2018-10-02 ENCOUNTER — OFFICE VISIT (OUTPATIENT)
Dept: CARDIOLOGY | Facility: CLINIC | Age: 83
End: 2018-10-02
Attending: NURSE PRACTITIONER
Payer: MEDICARE

## 2018-10-02 VITALS
HEART RATE: 64 BPM | SYSTOLIC BLOOD PRESSURE: 112 MMHG | BODY MASS INDEX: 23 KG/M2 | DIASTOLIC BLOOD PRESSURE: 60 MMHG | WEIGHT: 125 LBS | HEIGHT: 62 IN

## 2018-10-02 DIAGNOSIS — I70.90 ATHEROSCLEROSIS: ICD-10-CM

## 2018-10-02 DIAGNOSIS — I48.0 PAROXYSMAL ATRIAL FIBRILLATION (H): Primary | ICD-10-CM

## 2018-10-02 DIAGNOSIS — I20.1 PRINZMETAL ANGINA (H): ICD-10-CM

## 2018-10-02 PROCEDURE — 99213 OFFICE O/P EST LOW 20 MIN: CPT | Performed by: NURSE PRACTITIONER

## 2018-10-02 NOTE — LETTER
10/2/2018    NOLBERTO DURON  [address]  [facility] [address]    RE: Ralph Whitten       Dear Colleague,    I had the pleasure of seeing Ralph Whitten in the [facility].    History of Present Illness:      Ralph Whitten is a 87 year old female followed here by Dr. De Jesus. She is one of her most delightful patients and looks much younger than her stated age. She returns today to follow-up. Interestingly she had a CVA in February of this year which started with dizziness.  When she got to the emergency room she had right leg weakness and was given lytic therapy.  It was felt that this was due to small vessel ischemic disease.  But now we have seen paroxysmal  atrial fibrillation on a follow up event recorder so it is certainly possible it could have been from an undetected arrhythmia.      She also has Prinzmetal's angina but her chest discomfort has been quite stable lately.  Her shoulder pain improved with an injection.       She had an invasive angiogram in 2000 showing no significant coronary artery disease but confirmed diagnosis of Prinzmetal angina. She has done well on diltiazem. Stress testing in 2009 was unremarkable for ischemia. Based on her change in symptoms of left shoulder pain she underwent a CT coronary in May of 2017 showing a 25-40% stenosis in the LAD, 25% proximal LAD with poor visualization of the distal LAD. The circumflex and right coronary had no stenosis. Total calcium score was 31. There was an incidental finding of a 5 mm right lower lung lower lobe lung nodule; repeat scan was done in May 2018 and showed a few new small nodules.  She does not likely meet criteria to have these rescreened and we sent the primary care physician these results to follow-up; however she had changed primary care and I will send these to her new PMD as well.  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      She has a  history of paroxysmal atrial tachycardia.  Recent event recorder documented self-limited episodes of paroxysmal atrial fibrillation.  Her [id number] score is 6 if you do not include vascular disease.  However she has an LAD lesion of approximately for 40% by CT angiogram so her [id number] could be 7 which gives her an 9% plus risk of stroke without higher level anticoagulation. We stopped Plavix that had been started post-CVA and began Eliquis 2.5mg twice daily based on age and weight. She remains on Aspirin and Celexa (some anti-platelet effect) but she stopped her Omega-3 fatty acids.    She has significant social stressors in her life. 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 This is limited their activity, socialization, and caused stress and anxiety for her.  She recently sold her house and moved to a senior building that has the ability to transition to assisted living or memory care. She finally feels settled and is very unburdened with this move.     Blood pressures well controlled after increase in losartan.   She has no other complaints today and is not aware of her atrial fibrillation. She had not had any palpitations or chest pain since I saw her last visit.         Impression/Plan:       1. Prinzmetal angina currently under good control. We had considered an invasive coronary angiogram but her shoulder pain has responded to an injection of steroids and physical therapy via the orthopedic clinic. We will continue her medical management.  Continue ASA 81mg daily  2. Hypertension improved control continue losartan 50 mg per day.   3. Situational depression on Celexa--this is likely in part due to the stress being a caregiver for her ; this medication adds an additional blood thinner effect but she has been stable and  had great benefit from this.  4.Parosxymal atrial fibrillation-asymptomatic with elevated [id number] score.    -Continue Eliquis 2.5mg twice daily; if this becomes unaffordable, she would be willing to change to Warfarin and have this managed at [facility] as her  takes warfarin and goes to the INR clinic there.  I have provided her with some samples  5. Dyslipidemia treated with LDL 73--continue pravastatin 40 mg day.     Follow up with Dr. De Jesus in the spring or sooner if need be.     As, always it has been pleasure.    Gwyn Camara, MSN, APRN-BC, CNP  Cardiology    Orders Placed This Encounter   Procedures     Basic metabolic panel     Lipid Profile     ALT     No orders of the defined types were placed in this encounter.    There are no discontinued medications.      Encounter Diagnoses   Name Primary?     Paroxysmal atrial fibrillation (H)      Prinzmetal angina (H) Yes     Atherosclerosis         CURRENT MEDICATIONS:  Current Outpatient Prescriptions   Medication Sig Dispense Refill     apixaban ANTICOAGULANT (ELIQUIS) 2.5 MG tablet Take 1 tablet (2.5 mg) by mouth 2 times daily 60 tablet 11     aspirin 81 MG tablet Take 81 mg by mouth daily       Cholecalciferol (VITAMIN D3 PO) Take by mouth daily       citalopram (CELEXA) 10 MG tablet Take 10 mg by mouth daily       diltiazem 240 MG 24 hr ER capsule Take 240 mg by mouth daily       levothyroxine (SYNTHROID) 75 MCG tablet Take 75 mcg by mouth daily       LORAZEPAM PO Take 0.5 mg by mouth daily as needed        losartan (COZAAR) 50 MG tablet Take 1 tablet (50 mg) by mouth daily 90 tablet 3     metFORMIN (GLUCOPHAGE) 500 MG tablet Take 500 mg by mouth 2 times daily (with meals)       Multiple Vitamin (DAILY MULTIVITAMIN PO) Take by mouth daily        nitroglycerin (NITROSTAT) 0.4 MG sublingual tablet Place 0.4 mg under the tongue every 5 minutes as needed for chest pain For chest pain place 1 tablet under the tongue every 5 minutes for 3 doses. If  symptoms persist 5 minutes after 1st dose call 911.       OMEPRAZOLE PO Take 20 mg by mouth       pravastatin (PRAVACHOL) 40 MG tablet Take 1 tablet (40 mg) by mouth At Bedtime 90 tablet 3     diltiazem (CARDIZEM) 60 MG tablet Take 1 tablet (60 mg) by mouth as needed Take 1 tablet only as needed for increased palpitatons (Patient not taking: Reported on 10/2/2018) 10 tablet 2       ALLERGIES     Allergies   Allergen Reactions     Diovan [Valsartan] Other (See Comments) and GI Disturbance     Arthralgia     Hmg-Coa-R Inhibitors Other (See Comments)     Statin Medications give patient Myalgias---simvastatin     Sulfa Drugs Rash       PAST MEDICAL HISTORY:  Past Medical History:   Diagnosis Date     CVA (cerebral vascular accident) (H) 02/2018    ANW-details unk, treated with t-pa (dizziness presenting sx)     Degenerative disk disease     C 6/7     Diabetes mellitus (H)      Diabetic neuropathy (H)      Essential hypertension, benign      Generalized osteoarthrosis, unspecified site (aka ARTHRITIS)     osteoporosis and osteoarthritis     Hyperlipidemia      Hypothyroid      Mitral valve disorder     mild/mod     PAT (paroxysmal atrial tachycardia) (H)      Prinzmetal angina (H)     no cad 2000     PUD (peptic ulcer disease)      Rheumatic fever      Tricuspid regurgitation     mod     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       PAST SURGICAL HISTORY:  Past Surgical History:   Procedure Laterality Date     APPENDECTOMY       C RAD RESEC TONSIL/PILLARS       CORONARY ANGIOGRAPHY ADULT ORDER  2002    normal Coronary arteries, Vasospasms     HYSTERECTOMY      total     rotator cuff surgery      right     TONSILLECTOMY         FAMILY HISTORY:  Family History   Problem Relation Age of Onset     Coronary Artery Disease Mother      Myocardial Infarction Mother      Heart Failure Father      Unknown/Adopted Maternal Grandmother      Unknown/Adopted Maternal Grandfather      Unknown/Adopted  "Paternal Grandmother      Unknown/Adopted Paternal Grandfather        SOCIAL HISTORY:  Social History     Social History     Marital status:      Spouse name: N/A     Number of children: N/A     Years of education: N/A     Social History Main Topics     Smoking status: Never Smoker     Smokeless tobacco: Never Used     Alcohol use Yes      Comment: occ. wine     Drug use: None     Sexual activity: Not Asked     Other Topics Concern     Parent/Sibling W/ Cabg, Mi Or Angioplasty Before 65f 55m? No     Caffeine Concern No     coffee: 3-4 cups a day     Sleep Concern No     Stress Concern No     Weight Concern No     Special Diet Yes     diabetic diet, healthy     Exercise Yes     walking x3 a week     Seat Belt Yes     Social History Narrative       Review of Systems:  Skin:  Positive for bruising     Eyes:  Positive for glasses    ENT:  Negative      Respiratory:  Negative       Cardiovascular:    Positive for;edema;dizziness swelling in ankles in the morning only  Gastroenterology: Positive for heartburn;reflux    Genitourinary:  not assessed urinary frequency;urgency    Musculoskeletal:  Positive for joint pain;arthritis;joint swelling  (slight discomfort this morning and took a nitro, pain was better)  Neurologic:  Positive for headaches;numbness or tingling of feet;numbness or tingling of hands;stroke at night  Psychiatric:  Negative      Heme/Lymph/Imm:  Positive for allergies;easy bruising    Endocrine:  Positive for thyroid disorder;diabetes      Physical Exam:  Vitals: /60  Pulse 64  Ht 1.562 m (5' 1.5\")  Wt 56.7 kg (125 lb)  BMI 23.24 kg/m2    Constitutional:  cooperative, alert and oriented, well developed, well nourished, in no acute distress appears younger than stated age      Skin:  warm and dry to the touch, no apparent skin lesions or masses noted          Head:  normocephalic, no masses or lesions        Eyes:  pupils equal and round, conjunctivae and lids unremarkable, sclera white, " no xanthalasma, EOMS intact, no nystagmus        Lymph:No Cervical lymphadenopathy present     ENT:  no pallor or cyanosis, dentition good        Neck:  carotid pulses are full and equal bilaterally, JVP normal, no carotid bruit        Respiratory:  normal breath sounds, clear to auscultation, normal A-P diameter, normal symmetry, normal respiratory excursion, no use of accessory muscles         Cardiac: regular rhythm, normal S1/S2, no S3 or S4, apical impulse not displaced, no murmurs, gallops or rubs       grade 1;systolic murmur     minimal syst murmru at apex ( echo mild mr, mild+ tr)  pulses full and equal, no bruits auscultated                                        GI:  abdomen soft, non-tender, BS normoactive, no mass, no HSM, no bruits        Extremities and Muscular Skeletal:  no deformities, clubbing, cyanosis, erythema observed;no edema   bilateral LE edema;trace;R greater than L          Neurological:  no gross motor deficits        Psych:  Alert and Oriented x 3      Recent Lab Results:  LIPID RESULTS:  Lab Results   Component Value Date    CHOL 155 06/18/2018    HDL 69 06/18/2018    LDL 73 06/18/2018    TRIG 63 06/18/2018    CHOLHDLRATIO 3.1 01/12/2011       LIVER ENZYME RESULTS:  Lab Results   Component Value Date    AST 23 04/22/2016    ALT <5 (L) 04/18/2017     CBC RESULTS:  Lab Results   Component Value Date    WBC 7.0 03/31/2015    RBC 3.97 03/31/2015    HGB 12.1 03/31/2015    HCT 36.7 03/31/2015    MCV 92 03/31/2015    MCH 30.5 03/31/2015    MCHC 33 03/31/2015    RDW 13.2 03/31/2015     03/31/2015       BMP RESULTS:  Lab Results   Component Value Date     06/18/2018    POTASSIUM 4.7 06/18/2018    CHLORIDE 103 06/18/2018    CO2 24 06/18/2018    ANIONGAP 13.7 06/18/2018     (H) 06/18/2018    BUN 24 06/18/2018    CR 1.11 06/18/2018    GFRESTIMATED 46 (L) 06/18/2018    GFRESTBLACK 56 (L) 06/18/2018    CHUN 10.1 06/18/2018      A1C RESULTS:  Lab Results   Component Value Date     A1C 6.9 (A) 04/22/2016       INR RESULTS:  No results found for: INR    Thank you for allowing me to participate in the care of your patient.    Sincerely,     ANDREZ Arcos [facility]

## 2018-10-02 NOTE — MR AVS SNAPSHOT
"              After Visit Summary   10/2/2018    Ralph Whitten    MRN: [id number]           Patient Information     Date Of Birth          10/16/1930        Visit Information        Provider Department      10/2/2018 2:30 PM Gwyn Camara APRN CNP [facility]        Today's Diagnoses     Prinzmetal angina (H)    -  1    Paroxysmal atrial fibrillation (H)        Atherosclerosis           Care Instructions    No changes today    See us in May with fasting labs or sooner if you need to            Follow-ups after your visit        Future tests that were ordered for you today     Open Future Orders        Priority Expected Expires Ordered    Basic metabolic panel Routine 5/22/2019 10/2/2019 10/2/2018    Lipid Profile Routine 5/29/2019 10/2/2019 10/2/2018    ALT Routine 5/22/2019 10/2/2019 10/2/2018            Who to contact     If you have questions or need follow up information about today's clinic visit or your schedule please contact [facility] directly at [phone].  Normal or non-critical lab and imaging results will be communicated to you by SE[employer] 4ahart, letter or phone within 4 business days after the clinic has received the results. If you do not hear from us within 7 days, please contact the clinic through [employer] or phone. If you have a critical or abnormal lab result, we will notify you by phone as soon as possible.  Submit refill requests through [employer] or call your pharmacy and they will forward the refill request to us. Please allow 3 business days for your refill to be completed.          Additional Information About Your Visit        SEAT 4aharWAYN Information     [employer] lets you send messages to your doctor, view your test results, renew your prescriptions, schedule appointments and more. To sign up, go to [email or web address] . Click on \"Log in\" on the left side of the screen, which will take you to the Welcome page. " "Then click on \"Sign up Now\" on the right side of the page.     You will be asked to enter the access code listed below, as well as some personal information. Please follow the directions to create your username and password.     Your access code is: [email or web address]  Expires: 2018  2:23 PM     Your access code will  in 90 days. If you need help or a new code, please call your [facility] clinic or [phone].        Care EveryWhere ID     This is your Care EveryWhere ID. This could be used by other organizations to access your [facility] medical records  [id number]        Your Vitals Were     Pulse Height BMI (Body Mass Index)             64 1.562 m (5' 1.5\") 23.24 kg/m2          Blood Pressure from Last 3 Encounters:   10/02/18 112/60   18 120/60   18 134/68    Weight from Last 3 Encounters:   10/02/18 56.7 kg (125 lb)   18 56.2 kg (124 lb)   18 56.7 kg (125 lb)              We Performed the Following     Follow-Up with Cardiac Advanced Practice Provider        Primary Care Provider Office Phone # Fax #    Peiyi Berry [phone]       [address] SMITH [address] [place] [address]        Equal Access to Services     JOBY MCDANIEL : Hadii adam ku hadasho Soomaali, waaxda luqadaha, qaybta kaalmada adeegyada, bhupinder steiner. So [facility] [phone].    ATENCIÓN: Si habla español, tiene a singh disposición servicios gratuitos de asistencia lingüística. Llame al [phone].    We comply with applicable federal civil rights laws and [place] laws. We do not discriminate on the basis of race, color, national origin, age, disability, sex, sexual orientation, or gender identity.            Thank you!     Thank you for choosing [facility]  for your care. Our goal is always to provide you with excellent care. Hearing back from our patients is one way we can continue to improve our services. Please take " a few minutes to complete the written survey that you may receive in the mail after your visit with us. Thank you!             Your Updated Medication List - Protect others around you: Learn how to safely use, store and throw away your medicines at www.disposemymeds.org.          This list is accurate as of 10/2/18  3:06 PM.  Always use your most recent med list.                   Brand Name Dispense Instructions for use Diagnosis    apixaban ANTICOAGULANT 2.5 MG tablet    ELIQUIS    60 tablet    Take 1 tablet (2.5 mg) by mouth 2 times daily    Paroxysmal atrial fibrillation (H)       aspirin 81 MG tablet      Take 81 mg by mouth daily        celeXA 10 MG tablet   Generic drug:  citalopram      Take 10 mg by mouth daily        DAILY MULTIVITAMIN PO      Take by mouth daily        * diltiazem 240 MG 24 hr capsule      Take 240 mg by mouth daily        * diltiazem 60 MG tablet    CARDIZEM    10 tablet    Take 1 tablet (60 mg) by mouth as needed Take 1 tablet only as needed for increased palpitatons    Paroxysmal supraventricular tachycardia (H)       LORAZEPAM PO      Take 0.5 mg by mouth daily as needed        losartan 50 MG tablet    COZAAR    90 tablet    Take 1 tablet (50 mg) by mouth daily    Benign essential hypertension       metFORMIN 500 MG tablet    GLUCOPHAGE     Take 500 mg by mouth 2 times daily (with meals)        nitroGLYcerin 0.4 MG sublingual tablet    NITROSTAT     Place 0.4 mg under the tongue every 5 minutes as needed for chest pain For chest pain place 1 tablet under the tongue every 5 minutes for 3 doses. If symptoms persist 5 minutes after 1st dose call 911.        OMEPRAZOLE PO      Take 20 mg by mouth        pravastatin 40 MG tablet    PRAVACHOL    90 tablet    Take 1 tablet (40 mg) by mouth At Bedtime    Prinzmetal angina (H)       SYNTHROID 75 MCG tablet   Generic drug:  levothyroxine      Take 75 mcg by mouth daily        VITAMIN D3 PO      Take by mouth daily        * Notice:  This list  has 2 medication(s) that are the same as other medications prescribed for you. Read the directions carefully, and ask your doctor or other care provider to review them with you.

## 2018-10-02 NOTE — PROGRESS NOTES
This patient had CT as part of CTA in 2017 and then chest ct in 5-2018 to follow up on lung nodules    I asked for these to be sent to PMD to decide if we need additional scans as I do not think she meets criteria for another scan      She has also switched primary MD's and so I am not sure that she received these    Can you call nurse for Dr. Berry- ([facility]) and send the scans over so they can follow up please    Thanks  Gwyn

## 2018-10-02 NOTE — PROGRESS NOTES
History of Present Illness:      Ralph Whitten is a 87 year old female followed here by Dr. De Jesus. She is one of her most delightful patients and looks much younger than her stated age. She returns today to follow-up. Interestingly she had a CVA in February of this year which started with dizziness.  When she got to the emergency room she had right leg weakness and was given lytic therapy.  It was felt that this was due to small vessel ischemic disease.  But now we have seen paroxysmal  atrial fibrillation on a follow up event recorder so it is certainly possible it could have been from an undetected arrhythmia.      She also has Prinzmetal's angina but her chest discomfort has been quite stable lately.  Her shoulder pain improved with an injection.       She had an invasive angiogram in 2000 showing no significant coronary artery disease but confirmed diagnosis of Prinzmetal angina. She has done well on diltiazem. Stress testing in 2009 was unremarkable for ischemia. Based on her change in symptoms of left shoulder pain she underwent a CT coronary in May of 2017 showing a 25-40% stenosis in the LAD, 25% proximal LAD with poor visualization of the distal LAD. The circumflex and right coronary had no stenosis. Total calcium score was 31. There was an incidental finding of a 5 mm right lower lung lower lobe lung nodule; repeat scan was done in May 2018 and showed a few new small nodules.  She does not likely meet criteria to have these rescreened and we sent the primary care physician these results to follow-up; however she had changed primary care and I will send these to her new PMD as well.  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      She has a history of paroxysmal atrial tachycardia.  Recent event recorder documented self-limited episodes of paroxysmal atrial fibrillation.  Her [id number] score is 6 if you do not include vascular disease.  However she has an LAD lesion of  approximately for 40% by CT angiogram so her [id number] could be 7 which gives her an 9% plus risk of stroke without higher level anticoagulation. We stopped Plavix that had been started post-CVA and began Eliquis 2.5mg twice daily based on age and weight. She remains on Aspirin and Celexa (some anti-platelet effect) but she stopped her Omega-3 fatty acids.    She has significant social stressors in her life. 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 This is limited their activity, socialization, and caused stress and anxiety for her.  She recently sold her house and moved to a senior building that has the ability to transition to assisted living or memory care. She finally feels settled and is very unburdened with this move.     Blood pressures well controlled after increase in losartan.   She has no other complaints today and is not aware of her atrial fibrillation. She had not had any palpitations or chest pain since I saw her last visit.         Impression/Plan:       1. Prinzmetal angina currently under good control. We had considered an invasive coronary angiogram but her shoulder pain has responded to an injection of steroids and physical therapy via the orthopedic clinic. We will continue her medical management.  Continue ASA 81mg daily  2. Hypertension improved control continue losartan 50 mg per day.   3. Situational depression on Celexa--this is likely in part due to the stress being a caregiver for her ; this medication adds an additional blood thinner effect but she has been stable and had great benefit from this.  4.Parosxymal atrial fibrillation-asymptomatic with elevated [id number] score.    -Continue Eliquis 2.5mg twice daily; if this becomes unaffordable, she would be willing to change to Warfarin and have this  managed at [facility] as her  takes warfarin and goes to the INR clinic there.  I have provided her with some samples  5. Dyslipidemia treated with LDL 73--continue pravastatin 40 mg day.     Follow up with Dr. De Jesus in the spring or sooner if need be.     As, always it has been pleasure.    Gwyn Camara, MSN, APRN-BC, CNP  Cardiology    Orders Placed This Encounter   Procedures     Basic metabolic panel     Lipid Profile     ALT     No orders of the defined types were placed in this encounter.    There are no discontinued medications.      Encounter Diagnoses   Name Primary?     Paroxysmal atrial fibrillation (H)      Prinzmetal angina (H) Yes     Atherosclerosis         CURRENT MEDICATIONS:  Current Outpatient Prescriptions   Medication Sig Dispense Refill     apixaban ANTICOAGULANT (ELIQUIS) 2.5 MG tablet Take 1 tablet (2.5 mg) by mouth 2 times daily 60 tablet 11     aspirin 81 MG tablet Take 81 mg by mouth daily       Cholecalciferol (VITAMIN D3 PO) Take by mouth daily       citalopram (CELEXA) 10 MG tablet Take 10 mg by mouth daily       diltiazem 240 MG 24 hr ER capsule Take 240 mg by mouth daily       levothyroxine (SYNTHROID) 75 MCG tablet Take 75 mcg by mouth daily       LORAZEPAM PO Take 0.5 mg by mouth daily as needed        losartan (COZAAR) 50 MG tablet Take 1 tablet (50 mg) by mouth daily 90 tablet 3     metFORMIN (GLUCOPHAGE) 500 MG tablet Take 500 mg by mouth 2 times daily (with meals)       Multiple Vitamin (DAILY MULTIVITAMIN PO) Take by mouth daily        nitroglycerin (NITROSTAT) 0.4 MG sublingual tablet Place 0.4 mg under the tongue every 5 minutes as needed for chest pain For chest pain place 1 tablet under the tongue every 5 minutes for 3 doses. If symptoms persist 5 minutes after 1st dose call 911.       OMEPRAZOLE PO Take 20 mg by mouth       pravastatin (PRAVACHOL) 40 MG tablet Take 1 tablet (40 mg) by mouth At Bedtime 90 tablet 3     diltiazem (CARDIZEM) 60 MG tablet Take 1  tablet (60 mg) by mouth as needed Take 1 tablet only as needed for increased palpitatons (Patient not taking: Reported on 10/2/2018) 10 tablet 2       ALLERGIES     Allergies   Allergen Reactions     Diovan [Valsartan] Other (See Comments) and GI Disturbance     Arthralgia     Hmg-Coa-R Inhibitors Other (See Comments)     Statin Medications give patient Myalgias---simvastatin     Sulfa Drugs Rash       PAST MEDICAL HISTORY:  Past Medical History:   Diagnosis Date     CVA (cerebral vascular accident) (H) 02/2018    ANW-details unk, treated with t-pa (dizziness presenting sx)     Degenerative disk disease     C 6/7     Diabetes mellitus (H)      Diabetic neuropathy (H)      Essential hypertension, benign      Generalized osteoarthrosis, unspecified site (aka ARTHRITIS)     osteoporosis and osteoarthritis     Hyperlipidemia      Hypothyroid      Mitral valve disorder     mild/mod     PAT (paroxysmal atrial tachycardia) (H)      Prinzmetal angina (H)     no cad 2000     PUD (peptic ulcer disease)      Rheumatic fever      Tricuspid regurgitation     mod     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       PAST SURGICAL HISTORY:  Past Surgical History:   Procedure Laterality Date     APPENDECTOMY       C RAD RESEC TONSIL/PILLARS       CORONARY ANGIOGRAPHY ADULT ORDER  2002    normal Coronary arteries, Vasospasms     HYSTERECTOMY      total     rotator cuff surgery      right     TONSILLECTOMY         FAMILY HISTORY:  Family History   Problem Relation Age of Onset     Coronary Artery Disease Mother      Myocardial Infarction Mother      Heart Failure Father      Unknown/Adopted Maternal Grandmother      Unknown/Adopted Maternal Grandfather      Unknown/Adopted Paternal Grandmother      Unknown/Adopted Paternal Grandfather        SOCIAL HISTORY:  Social History     Social History     Marital status:      Spouse name: N/A     Number of children: N/A     Years of education: N/A  "    Social History Main Topics     Smoking status: Never Smoker     Smokeless tobacco: Never Used     Alcohol use Yes      Comment: occ. wine     Drug use: None     Sexual activity: Not Asked     Other Topics Concern     Parent/Sibling W/ Cabg, Mi Or Angioplasty Before 65f 55m? No     Caffeine Concern No     coffee: 3-4 cups a day     Sleep Concern No     Stress Concern No     Weight Concern No     Special Diet Yes     diabetic diet, healthy     Exercise Yes     walking x3 a week     Seat Belt Yes     Social History Narrative       Review of Systems:  Skin:  Positive for bruising     Eyes:  Positive for glasses    ENT:  Negative      Respiratory:  Negative       Cardiovascular:    Positive for;edema;dizziness swelling in ankles in the morning only  Gastroenterology: Positive for heartburn;reflux    Genitourinary:  not assessed urinary frequency;urgency    Musculoskeletal:  Positive for joint pain;arthritis;joint swelling  (slight discomfort this morning and took a nitro, pain was better)  Neurologic:  Positive for headaches;numbness or tingling of feet;numbness or tingling of hands;stroke at night  Psychiatric:  Negative      Heme/Lymph/Imm:  Positive for allergies;easy bruising    Endocrine:  Positive for thyroid disorder;diabetes      Physical Exam:  Vitals: /60  Pulse 64  Ht 1.562 m (5' 1.5\")  Wt 56.7 kg (125 lb)  BMI 23.24 kg/m2    Constitutional:  cooperative, alert and oriented, well developed, well nourished, in no acute distress appears younger than stated age      Skin:  warm and dry to the touch, no apparent skin lesions or masses noted          Head:  normocephalic, no masses or lesions        Eyes:  pupils equal and round, conjunctivae and lids unremarkable, sclera white, no xanthalasma, EOMS intact, no nystagmus        Lymph:No Cervical lymphadenopathy present     ENT:  no pallor or cyanosis, dentition good        Neck:  carotid pulses are full and equal bilaterally, JVP normal, no carotid " bruit        Respiratory:  normal breath sounds, clear to auscultation, normal A-P diameter, normal symmetry, normal respiratory excursion, no use of accessory muscles         Cardiac: regular rhythm, normal S1/S2, no S3 or S4, apical impulse not displaced, no murmurs, gallops or rubs       grade 1;systolic murmur     minimal syst murmru at apex ( echo mild mr, mild+ tr)  pulses full and equal, no bruits auscultated                                        GI:  abdomen soft, non-tender, BS normoactive, no mass, no HSM, no bruits        Extremities and Muscular Skeletal:  no deformities, clubbing, cyanosis, erythema observed;no edema   bilateral LE edema;trace;R greater than L          Neurological:  no gross motor deficits        Psych:  Alert and Oriented x 3      Recent Lab Results:  LIPID RESULTS:  Lab Results   Component Value Date    CHOL 155 06/18/2018    HDL 69 06/18/2018    LDL 73 06/18/2018    TRIG 63 06/18/2018    CHOLHDLRATIO 3.1 01/12/2011       LIVER ENZYME RESULTS:  Lab Results   Component Value Date    AST 23 04/22/2016    ALT <5 (L) 04/18/2017       CBC RESULTS:  Lab Results   Component Value Date    WBC 7.0 03/31/2015    RBC 3.97 03/31/2015    HGB 12.1 03/31/2015    HCT 36.7 03/31/2015    MCV 92 03/31/2015    MCH 30.5 03/31/2015    MCHC 33 03/31/2015    RDW 13.2 03/31/2015     03/31/2015       BMP RESULTS:  Lab Results   Component Value Date     06/18/2018    POTASSIUM 4.7 06/18/2018    CHLORIDE 103 06/18/2018    CO2 24 06/18/2018    ANIONGAP 13.7 06/18/2018     (H) 06/18/2018    BUN 24 06/18/2018    CR 1.11 06/18/2018    GFRESTIMATED 46 (L) 06/18/2018    GFRESTBLACK 56 (L) 06/18/2018    CHUN 10.1 06/18/2018        A1C RESULTS:  Lab Results   Component Value Date    A1C 6.9 (A) 04/22/2016       INR RESULTS:  No results found for: INR        CC  Gwyn Camara, APRN CNP  [id number]  [place] [address]

## 2018-10-02 NOTE — PROGRESS NOTES
Per DTK's request, called and spoke with Dr. Berry's nurse, Marla, at [facility], reviewed that pt's provider is requesting Dr. Berry review CT results from 2017 and 5/2018 to determine if further follow up for lung nodules is needed.  Marla verbalized understanding and requested CT results be faxed to [phone].  CT results faxed as requested.      DONNY Rosenbaum 3:29 PM 10/2/2018

## 2018-10-02 NOTE — LETTER
10/2/2018    NOLBERTO DURON  [address]  [facility] [address]    RE: Ralph Whitten       Dear Colleague,    I had the pleasure of seeing Ralph Whitten in the [facility].    History of Present Illness:      Ralph Whitten is a 87 year old female followed here by Dr. De Jesus. She is one of her most delightful patients and looks much younger than her stated age. She returns today to follow-up. Interestingly she had a CVA in February of this year which started with dizziness.  When she got to the emergency room she had right leg weakness and was given lytic therapy.  It was felt that this was due to small vessel ischemic disease.  But now we have seen paroxysmal  atrial fibrillation on a follow up event recorder so it is certainly possible it could have been from an undetected arrhythmia.      She also has Prinzmetal's angina but her chest discomfort has been quite stable lately.  Her shoulder pain improved with an injection.       She had an invasive angiogram in 2000 showing no significant coronary artery disease but confirmed diagnosis of Prinzmetal angina. She has done well on diltiazem. Stress testing in 2009 was unremarkable for ischemia. Based on her change in symptoms of left shoulder pain she underwent a CT coronary in May of 2017 showing a 25-40% stenosis in the LAD, 25% proximal LAD with poor visualization of the distal LAD. The circumflex and right coronary had no stenosis. Total calcium score was 31. There was an incidental finding of a 5 mm right lower lung lower lobe lung nodule; repeat scan was done in May 2018 and showed a few new small nodules.  She does not likely meet criteria to have these rescreened and we sent the primary care physician these results to follow-up; however she had changed primary care and I will send these to her new PMD as well.  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      She has a  history of paroxysmal atrial tachycardia.  Recent event recorder documented self-limited episodes of paroxysmal atrial fibrillation.  Her [id number] score is 6 if you do not include vascular disease.  However she has an LAD lesion of approximately for 40% by CT angiogram so her [id number] could be 7 which gives her an 9% plus risk of stroke without higher level anticoagulation. We stopped Plavix that had been started post-CVA and began Eliquis 2.5mg twice daily based on age and weight. She remains on Aspirin and Celexa (some anti-platelet effect) but she stopped her Omega-3 fatty acids.    She has significant social stressors in her life. 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 This is limited their activity, socialization, and caused stress and anxiety for her.  She recently sold her house and moved to a senior building that has the ability to transition to assisted living or memory care. She finally feels settled and is very unburdened with this move.     Blood pressures well controlled after increase in losartan.   She has no other complaints today and is not aware of her atrial fibrillation. She had not had any palpitations or chest pain since I saw her last visit.         Impression/Plan:       1. Prinzmetal angina currently under good control. We had considered an invasive coronary angiogram but her shoulder pain has responded to an injection of steroids and physical therapy via the orthopedic clinic. We will continue her medical management.  Continue ASA 81mg daily  2. Hypertension improved control continue losartan 50 mg per day.   3. Situational depression on Celexa--this is likely in part due to the stress being a caregiver for her ; this medication adds an additional blood thinner effect but she has been stable and  had great benefit from this.  4.Parosxymal atrial fibrillation-asymptomatic with elevated [id number] score.    -Continue Eliquis 2.5mg twice daily; if this becomes unaffordable, she would be willing to change to Warfarin and have this managed at [facility] as her  takes warfarin and goes to the INR clinic there.  I have provided her with some samples  5. Dyslipidemia treated with LDL 73--continue pravastatin 40 mg day.     Follow up with Dr. De Jesus in the spring or sooner if need be.     As, always it has been pleasure.    Gwyn Camara, MSN, APRN-BC, CNP  Cardiology    Orders Placed This Encounter   Procedures     Basic metabolic panel     Lipid Profile     ALT     No orders of the defined types were placed in this encounter.    There are no discontinued medications.      Encounter Diagnoses   Name Primary?     Paroxysmal atrial fibrillation (H)      Prinzmetal angina (H) Yes     Atherosclerosis         CURRENT MEDICATIONS:  Current Outpatient Prescriptions   Medication Sig Dispense Refill     apixaban ANTICOAGULANT (ELIQUIS) 2.5 MG tablet Take 1 tablet (2.5 mg) by mouth 2 times daily 60 tablet 11     aspirin 81 MG tablet Take 81 mg by mouth daily       Cholecalciferol (VITAMIN D3 PO) Take by mouth daily       citalopram (CELEXA) 10 MG tablet Take 10 mg by mouth daily       diltiazem 240 MG 24 hr ER capsule Take 240 mg by mouth daily       levothyroxine (SYNTHROID) 75 MCG tablet Take 75 mcg by mouth daily       LORAZEPAM PO Take 0.5 mg by mouth daily as needed        losartan (COZAAR) 50 MG tablet Take 1 tablet (50 mg) by mouth daily 90 tablet 3     metFORMIN (GLUCOPHAGE) 500 MG tablet Take 500 mg by mouth 2 times daily (with meals)       Multiple Vitamin (DAILY MULTIVITAMIN PO) Take by mouth daily        nitroglycerin (NITROSTAT) 0.4 MG sublingual tablet Place 0.4 mg under the tongue every 5 minutes as needed for chest pain For chest pain place 1 tablet under the tongue every 5 minutes for 3 doses. If  symptoms persist 5 minutes after 1st dose call 911.       OMEPRAZOLE PO Take 20 mg by mouth       pravastatin (PRAVACHOL) 40 MG tablet Take 1 tablet (40 mg) by mouth At Bedtime 90 tablet 3     diltiazem (CARDIZEM) 60 MG tablet Take 1 tablet (60 mg) by mouth as needed Take 1 tablet only as needed for increased palpitatons (Patient not taking: Reported on 10/2/2018) 10 tablet 2       ALLERGIES     Allergies   Allergen Reactions     Diovan [Valsartan] Other (See Comments) and GI Disturbance     Arthralgia     Hmg-Coa-R Inhibitors Other (See Comments)     Statin Medications give patient Myalgias---simvastatin     Sulfa Drugs Rash       PAST MEDICAL HISTORY:  Past Medical History:   Diagnosis Date     CVA (cerebral vascular accident) (H) 02/2018    ANW-details unk, treated with t-pa (dizziness presenting sx)     Degenerative disk disease     C 6/7     Diabetes mellitus (H)      Diabetic neuropathy (H)      Essential hypertension, benign      Generalized osteoarthrosis, unspecified site (aka ARTHRITIS)     osteoporosis and osteoarthritis     Hyperlipidemia      Hypothyroid      Mitral valve disorder     mild/mod     PAT (paroxysmal atrial tachycardia) (H)      Prinzmetal angina (H)     no cad 2000     PUD (peptic ulcer disease)      Rheumatic fever      Tricuspid regurgitation     mod     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       PAST SURGICAL HISTORY:  Past Surgical History:   Procedure Laterality Date     APPENDECTOMY       C RAD RESEC TONSIL/PILLARS       CORONARY ANGIOGRAPHY ADULT ORDER  2002    normal Coronary arteries, Vasospasms     HYSTERECTOMY      total     rotator cuff surgery      right     TONSILLECTOMY         FAMILY HISTORY:  Family History   Problem Relation Age of Onset     Coronary Artery Disease Mother      Myocardial Infarction Mother      Heart Failure Father      Unknown/Adopted Maternal Grandmother      Unknown/Adopted Maternal Grandfather      Unknown/Adopted  "Paternal Grandmother      Unknown/Adopted Paternal Grandfather        SOCIAL HISTORY:  Social History     Social History     Marital status:      Spouse name: N/A     Number of children: N/A     Years of education: N/A     Social History Main Topics     Smoking status: Never Smoker     Smokeless tobacco: Never Used     Alcohol use Yes      Comment: occ. wine     Drug use: None     Sexual activity: Not Asked     Other Topics Concern     Parent/Sibling W/ Cabg, Mi Or Angioplasty Before 65f 55m? No     Caffeine Concern No     coffee: 3-4 cups a day     Sleep Concern No     Stress Concern No     Weight Concern No     Special Diet Yes     diabetic diet, healthy     Exercise Yes     walking x3 a week     Seat Belt Yes     Social History Narrative       Review of Systems:  Skin:  Positive for bruising     Eyes:  Positive for glasses    ENT:  Negative      Respiratory:  Negative       Cardiovascular:    Positive for;edema;dizziness swelling in ankles in the morning only  Gastroenterology: Positive for heartburn;reflux    Genitourinary:  not assessed urinary frequency;urgency    Musculoskeletal:  Positive for joint pain;arthritis;joint swelling  (slight discomfort this morning and took a nitro, pain was better)  Neurologic:  Positive for headaches;numbness or tingling of feet;numbness or tingling of hands;stroke at night  Psychiatric:  Negative      Heme/Lymph/Imm:  Positive for allergies;easy bruising    Endocrine:  Positive for thyroid disorder;diabetes      Physical Exam:  Vitals: /60  Pulse 64  Ht 1.562 m (5' 1.5\")  Wt 56.7 kg (125 lb)  BMI 23.24 kg/m2    Constitutional:  cooperative, alert and oriented, well developed, well nourished, in no acute distress appears younger than stated age      Skin:  warm and dry to the touch, no apparent skin lesions or masses noted          Head:  normocephalic, no masses or lesions        Eyes:  pupils equal and round, conjunctivae and lids unremarkable, sclera white, " no xanthalasma, EOMS intact, no nystagmus        Lymph:No Cervical lymphadenopathy present     ENT:  no pallor or cyanosis, dentition good        Neck:  carotid pulses are full and equal bilaterally, JVP normal, no carotid bruit        Respiratory:  normal breath sounds, clear to auscultation, normal A-P diameter, normal symmetry, normal respiratory excursion, no use of accessory muscles         Cardiac: regular rhythm, normal S1/S2, no S3 or S4, apical impulse not displaced, no murmurs, gallops or rubs       grade 1;systolic murmur     minimal syst murmru at apex ( echo mild mr, mild+ tr)  pulses full and equal, no bruits auscultated                                        GI:  abdomen soft, non-tender, BS normoactive, no mass, no HSM, no bruits        Extremities and Muscular Skeletal:  no deformities, clubbing, cyanosis, erythema observed;no edema   bilateral LE edema;trace;R greater than L          Neurological:  no gross motor deficits        Psych:  Alert and Oriented x 3      Recent Lab Results:  LIPID RESULTS:  Lab Results   Component Value Date    CHOL 155 06/18/2018    HDL 69 06/18/2018    LDL 73 06/18/2018    TRIG 63 06/18/2018    CHOLHDLRATIO 3.1 01/12/2011       LIVER ENZYME RESULTS:  Lab Results   Component Value Date    AST 23 04/22/2016    ALT <5 (L) 04/18/2017       CBC RESULTS:  Lab Results   Component Value Date    WBC 7.0 03/31/2015    RBC 3.97 03/31/2015    HGB 12.1 03/31/2015    HCT 36.7 03/31/2015    MCV 92 03/31/2015    MCH 30.5 03/31/2015    MCHC 33 03/31/2015    RDW 13.2 03/31/2015     03/31/2015       BMP RESULTS:  Lab Results   Component Value Date     06/18/2018    POTASSIUM 4.7 06/18/2018    CHLORIDE 103 06/18/2018    CO2 24 06/18/2018    ANIONGAP 13.7 06/18/2018     (H) 06/18/2018    BUN 24 06/18/2018    CR 1.11 06/18/2018    GFRESTIMATED 46 (L) 06/18/2018    GFRESTBLACK 56 (L) 06/18/2018    CHUN 10.1 06/18/2018        A1C RESULTS:  Lab Results   Component Value Date     A1C 6.9 (A) 04/22/2016       INR RESULTS:  No results found for: INR        CC  ANDREZ Arcos CNP  [address]  [place] [id number]                  Thank you for allowing me to participate in the care of your patient.      Sincerely,     ANDREZ Arcos CNP     [facility]    cc:   ANDREZ Arcos CNP  [address]  [place] [id number]

## 2019-05-10 ENCOUNTER — TELEPHONE (OUTPATIENT)
Dept: CARDIOLOGY | Facility: CLINIC | Age: 84
End: 2019-05-10

## 2019-05-10 NOTE — TELEPHONE ENCOUNTER
Pt left a message stating she needed an appt with Dr. De Jesus or DANIELLE Santillan. RN called pt back and left a voicemail to call back to discuss.

## 2019-05-13 NOTE — TELEPHONE ENCOUNTER
Pt given preliminary report of CTa,  Pt asked to keep his OV with NP for further review. ISAAC Martinez RN

## 2019-06-19 DIAGNOSIS — I70.90 ATHEROSCLEROSIS: ICD-10-CM

## 2019-06-19 DIAGNOSIS — I48.0 PAROXYSMAL ATRIAL FIBRILLATION (H): ICD-10-CM

## 2019-06-19 LAB
ALT SERPL W P-5'-P-CCNC: 9 U/L (ref 5–30)
ANION GAP SERPL CALCULATED.3IONS-SCNC: 13 MMOL/L (ref 6–17)
BUN SERPL-MCNC: 21 MG/DL (ref 7–30)
CALCIUM SERPL-MCNC: 9.7 MG/DL (ref 8.5–10.5)
CHLORIDE SERPL-SCNC: 100 MMOL/L (ref 98–107)
CHOLEST SERPL-MCNC: 176 MG/DL
CO2 SERPL-SCNC: 26 MMOL/L (ref 23–29)
CREAT SERPL-MCNC: 1.12 MG/DL (ref 0.7–1.3)
GFR SERPL CREATININE-BSD FRML MDRD: 46 ML/MIN/{1.73_M2}
GLUCOSE SERPL-MCNC: 114 MG/DL (ref 70–105)
HDLC SERPL-MCNC: 72 MG/DL
LDLC SERPL CALC-MCNC: 88 MG/DL
NONHDLC SERPL-MCNC: 104 MG/DL
POTASSIUM SERPL-SCNC: 4 MMOL/L (ref 3.5–5.1)
SODIUM SERPL-SCNC: 135 MMOL/L (ref 136–145)
TRIGL SERPL-MCNC: 78 MG/DL

## 2019-06-19 PROCEDURE — 80048 BASIC METABOLIC PNL TOTAL CA: CPT | Performed by: NURSE PRACTITIONER

## 2019-06-19 PROCEDURE — 36415 COLL VENOUS BLD VENIPUNCTURE: CPT | Performed by: NURSE PRACTITIONER

## 2019-06-19 PROCEDURE — 84460 ALANINE AMINO (ALT) (SGPT): CPT | Performed by: NURSE PRACTITIONER

## 2019-06-19 PROCEDURE — 80061 LIPID PANEL: CPT | Performed by: NURSE PRACTITIONER

## 2019-06-19 NOTE — PROGRESS NOTES
History of Present Illness:      Ralph Whitten is a 88 year old female followed here by Dr. De Jesus. She is one of her most delightful patients and looks much younger than her stated age. She returns today for follow-up. She had a CVA in February of 2018 which started with dizziness.  When she got to the emergency room she had right leg weakness and was given lytic therapy.  It was felt that this was due to small vessel ischemic disease.  But now we have seen paroxysmal  atrial fibrillation on a follow up event recorder so it is certainly possible it could have been from an undetected arrhythmia. Eliquis had been started for stroke prophylaxis.    She has a history of paroxysmal atrial tachycardia. Her [id number] score is 6 if you do not include vascular disease.  However she has an LAD lesion of approximately for 40% by CT angiogram so her [id number] could be 7 which gives her an 9% plus risk of stroke without higher level anticoagulation. We stopped Plavix that had been started post-CVA and began Eliquis 2.5mg twice daily based on age and weight. She remains on Aspirin and Celexa (some anti-platelet effect) but she stopped her Omega-3 fatty acids. She notes some bruising so will reduce ASA to every other day.    She  has Prinzmetal's angina but her chest discomfort has been quite stable.       She had an invasive angiogram in 2000 showing no significant coronary artery disease but confirmed diagnosis of Prinzmetal angina with hands placed into cold water. She has done well on diltiazem. Stress testing in 2009 was unremarkable for ischemia.     Based on her change in symptoms of left shoulder pain in 2017,  she underwent a CT coronary in May of 2017 showing a 25-40% stenosis in the LAD, 25% proximal LAD with poor visualization of the distal LAD. The circumflex and right coronary had no stenosis. Total calcium score was 31. There was an incidental finding of a 5 mm right lower lung lower lobe lung nodule;  repeat scan was done in May 2018 and showed a few new small nodules.  She does not likely meet criteria to have these rescreened and we sent the primary care physician these results to follow-up. 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       She has significant social stressors in her life. 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 This is limited their activity, socialization, and caused stress and anxiety for her.  She recently sold her house and moved to a senior building that has the ability to transition to assisted living or memory care. Her  currently is at [facility] with an MI and stents. He suffered a post procedure stroke with left sided weakness and now may have a GI bleed; he will be going to TCU for recovery. Her 58 year old daughter has been diagnosed with ovarian cancer and is undergoing chemotherapy followed by eventual surgery.     Blood pressures is  well controlled after increase in losartan however she notes slight orthostatic dizziness at times. She has about a 10mm orthostatic drop today and has been placed on Celexa as well. We talked about options and she will try moving Losartan to bedtime for now to see if that helps.    She is not aware of her atrial fibrillation when she has it. She had not had any palpitations or chest pain.     Last echocardiogram in 2017 noted 2+ TR and she has had variable levels of MR up to 1-2+ in the past. EF is preserved.    BMP: sodium 135 (previous 136; on Celexa now), potassium 4.0 BUN 21 Creatinine 1.12.  Lipid panel: total cholesterol 176 HDL 72 LDL 88 TG normal  Glucose is mildly elevated and she remains on Metformin.         Impression/Plan:       1. Prinzmetal angina currently under good control.   - her shoulder pain has  responded to an injection of steroids and physical therapy via the orthopedic clinic. We will continue her medical management.  Continue ASA 81mg but every other day  -continue Diltiazem  2. Hypertension improved control   -wide pulse pressure  -continue losartan 50 mg per day but move to bedtime dosing to see if mild orthostasic symptoms improve; if not she will call and schedule a return visit.  3. Situational depression on Celexa--this is likely in part due to the stress being a caregiver for her ; this medication adds an additional blood thinner effect but she has been stable and had great benefit from this.Omega 3 was stopped when Eliquis was started.  4.Parosxymal atrial fibrillation-asymptomatic with elevated [id number] score.    -Continue Eliquis 2.5mg twice daily; if this becomes unaffordable, she would be willing to change to Warfarin and have this managed at [facility] as her  takes warfarin and goes to the INR clinic there.  I have provided her with some samples  5. Dyslipidemia treated with LDL 88--continue pravastatin 40 mg day.  6. Mild valvular heart disease; moderate TR; variable MR--check echo at her follow up in 6 months     As, always it has been pleasure.     She will see Dr. De Jesus in 6 months    Greater than 50% of this 30 minute visit was spent in counseling    Gwyn Camara, MSN, APRN-BC, CNP  Cardiology    Orders Placed This Encounter   Procedures     Basic metabolic panel     Follow-Up with Cardiologist     Echocardiogram Complete     Orders Placed This Encounter   Medications     traZODone (DESYREL) 50 MG tablet     Sig: Take 50 mg by mouth At Bedtime     losartan (COZAAR) 50 MG tablet     Sig: Take 1 tablet (50 mg) by mouth     Dispense:  90 tablet     Refill:  3     apixaban ANTICOAGULANT (ELIQUIS) 2.5 MG tablet     Sig: Take 1 tablet (2.5 mg) by mouth 2 times daily     Dispense:  60 tablet     Refill:  11     nitroGLYcerin (NITROSTAT) 0.4 MG sublingual tablet     Sig:  Place 1 tablet (0.4 mg) under the tongue every 5 minutes as needed for chest pain     Dispense:  25 tablet     Refill:  3     Medications Discontinued During This Encounter   Medication Reason     losartan (COZAAR) 50 MG tablet Reorder     apixaban ANTICOAGULANT (ELIQUIS) 2.5 MG tablet Reorder     nitroglycerin (NITROSTAT) 0.4 MG sublingual tablet Reorder         Encounter Diagnoses   Name Primary?     Coronary vasospasm (H) Yes     Benign essential hypertension      Paroxysmal atrial fibrillation (H)        CURRENT MEDICATIONS:  Current Outpatient Medications   Medication Sig Dispense Refill     apixaban ANTICOAGULANT (ELIQUIS) 2.5 MG tablet Take 1 tablet (2.5 mg) by mouth 2 times daily 60 tablet 11     aspirin 81 MG tablet Take 81 mg by mouth every other day       Cholecalciferol (VITAMIN D3 PO) Take by mouth daily       citalopram (CELEXA) 10 MG tablet Take 10 mg by mouth daily       diltiazem (CARDIZEM) 60 MG tablet Take 1 tablet (60 mg) by mouth as needed Take 1 tablet only as needed for increased palpitatons 10 tablet 2     diltiazem 240 MG 24 hr ER capsule Take 240 mg by mouth daily       levothyroxine (SYNTHROID) 75 MCG tablet Take 75 mcg by mouth daily       LORAZEPAM PO Take 0.5 mg by mouth daily as needed        losartan (COZAAR) 50 MG tablet Take 1 tablet (50 mg) by mouth 90 tablet 3     metFORMIN (GLUCOPHAGE) 500 MG tablet Take 500 mg by mouth 2 times daily (with meals)       Multiple Vitamin (DAILY MULTIVITAMIN PO) Take by mouth daily        nitroGLYcerin (NITROSTAT) 0.4 MG sublingual tablet Place 1 tablet (0.4 mg) under the tongue every 5 minutes as needed for chest pain 25 tablet 3     OMEPRAZOLE PO Take 20 mg by mouth       pravastatin (PRAVACHOL) 40 MG tablet Take 1 tablet (40 mg) by mouth At Bedtime 90 tablet 3     traZODone (DESYREL) 50 MG tablet Take 50 mg by mouth At Bedtime         ALLERGIES     Allergies   Allergen Reactions     Diovan [Valsartan] Other (See Comments) and GI Disturbance      Arthralgia     Hmg-Coa-R Inhibitors Other (See Comments)     Statin Medications give patient Myalgias---simvastatin     Sulfa Drugs Rash       PAST MEDICAL HISTORY:  Past Medical History:   Diagnosis Date     CVA (cerebral vascular accident) (H) 02/2018    ANW-details unk, treated with t-pa (dizziness presenting sx)     Degenerative disk disease     C 6/7     Diabetes mellitus (H)      Diabetic neuropathy (H)      Essential hypertension, benign      Generalized osteoarthrosis, unspecified site (aka ARTHRITIS)     osteoporosis and osteoarthritis     Hyperlipidemia      Hypothyroid      Mitral valve disorder     mild/mod     PAT (paroxysmal atrial tachycardia) (H)      Prinzmetal angina (H)     no cad 2000     PUD (peptic ulcer disease)      Rheumatic fever      Tricuspid regurgitation     mod     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       PAST SURGICAL HISTORY:  Past Surgical History:   Procedure Laterality Date     APPENDECTOMY       C RAD RESEC TONSIL/PILLARS       CORONARY ANGIOGRAPHY ADULT ORDER  2002    normal Coronary arteries, Vasospasms     HYSTERECTOMY      total     rotator cuff surgery      right     TONSILLECTOMY         FAMILY HISTORY:  Family History   Problem Relation Age of Onset     Coronary Artery Disease Mother      Myocardial Infarction Mother      Heart Failure Father      Unknown/Adopted Maternal Grandmother      Unknown/Adopted Maternal Grandfather      Unknown/Adopted Paternal Grandmother      Unknown/Adopted Paternal Grandfather        SOCIAL HISTORY:  Social History     Socioeconomic History     Marital status:      Spouse name: None     Number of children: None     Years of education: None     Highest education level: None   Occupational History     None   Social Needs     Financial resource strain: None     Food insecurity:     Worry: None     Inability: None     Transportation needs:     Medical: None     Non-medical: None   Tobacco Use      Smoking status: Never Smoker     Smokeless tobacco: Never Used   Substance and Sexual Activity     Alcohol use: Yes     Comment: occ. wine     Drug use: None     Sexual activity: None   Lifestyle     Physical activity:     Days per week: None     Minutes per session: None     Stress: None   Relationships     Social connections:     Talks on phone: None     Gets together: None     Attends [employer] service: None     Active member of club or organization: None     Attends meetings of clubs or organizations: None     Relationship status: None     Intimate partner violence:     Fear of current or ex partner: None     Emotionally abused: None     Physically abused: None     Forced sexual activity: None   Other Topics Concern     Parent/sibling w/ CABG, MI or angioplasty before 65F 55M? No      Service Not Asked     Blood Transfusions Not Asked     Caffeine Concern No     Comment: coffee: 3-4 cups a day     Occupational Exposure Not Asked     Hobby Hazards Not Asked     Sleep Concern No     Stress Concern No     Weight Concern No     Special Diet Yes     Comment: diabetic diet, healthy     Back Care Not Asked     Exercise Yes     Comment: walking x3 a week     Bike Helmet Not Asked     Seat Belt Yes     Self-Exams Not Asked   Social History Narrative     None       Review of Systems:  Skin:  Positive for bruising     Eyes:  Positive for glasses    ENT:  Negative      Respiratory:  Negative       Cardiovascular:    Positive for;dizziness;edema swelling in ankles in the morning only  Gastroenterology: Positive for heartburn;reflux    Genitourinary:  Negative      Musculoskeletal:  Positive for joint pain;arthritis;joint swelling    Neurologic:  Positive for headaches;numbness or tingling of feet;numbness or tingling of hands;stroke at night  Psychiatric:  Negative      Heme/Lymph/Imm:  Positive for allergies;easy bruising    Endocrine:  Positive for thyroid disorder;diabetes      Physical Exam:  Vitals: /58    "Pulse 76   Ht 1.562 m (5' 1.5\")   Wt 55 kg (121 lb 4.8 oz)   BMI 22.55 kg/m      Constitutional:  cooperative, alert and oriented, well developed, well nourished, in no acute distress appears younger than stated age      Skin:  warm and dry to the touch, no apparent skin lesions or masses noted          Head:  normocephalic, no masses or lesions        Eyes:  pupils equal and round, conjunctivae and lids unremarkable, sclera white, no xanthalasma, EOMS intact, no nystagmus        Lymph:No Cervical lymphadenopathy present     ENT:  no pallor or cyanosis, dentition good        Neck:  carotid pulses are full and equal bilaterally, JVP normal, no carotid bruit        Respiratory:  normal breath sounds, clear to auscultation, normal A-P diameter, normal symmetry, normal respiratory excursion, no use of accessory muscles         Cardiac: regular rhythm, normal S1/S2, no S3 or S4, apical impulse not displaced, no murmurs, gallops or rubs       grade 1;systolic murmur     minimal syst murmru at apex ( echo mild mr, mild+ tr)  pulses full and equal, no bruits auscultated                                        GI:  abdomen soft, non-tender, BS normoactive, no mass, no HSM, no bruits        Extremities and Muscular Skeletal:  no deformities, clubbing, cyanosis, erythema observed;no edema   bilateral LE edema;trace;R greater than L          Neurological:  no gross motor deficits        Psych:  Alert and Oriented x 3      Recent Lab Results:  LIPID RESULTS:  Lab Results   Component Value Date    CHOL 176 06/19/2019    HDL 72 06/19/2019    LDL 88 06/19/2019    TRIG 78 06/19/2019    CHOLHDLRATIO 3.1 01/12/2011       LIVER ENZYME RESULTS:  Lab Results   Component Value Date    AST 23 04/22/2016    ALT 9 06/19/2019       CBC RESULTS:  Lab Results   Component Value Date    WBC 7.0 03/31/2015    RBC 3.97 03/31/2015    HGB 12.1 03/31/2015    HCT 36.7 03/31/2015    MCV 92 03/31/2015    MCH 30.5 03/31/2015    MCHC 33 03/31/2015    RDW " 13.2 03/31/2015     03/31/2015       BMP RESULTS:  Lab Results   Component Value Date     (L) 06/19/2019    POTASSIUM 4.0 06/19/2019    CHLORIDE 100 06/19/2019    CO2 26 06/19/2019    ANIONGAP 13 06/19/2019     (H) 06/19/2019    BUN 21 06/19/2019    CR 1.12 06/19/2019    GFRESTIMATED 46 (L) 06/19/2019    GFRESTBLACK 56 (L) 06/19/2019    CHUN 9.7 06/19/2019        A1C RESULTS:  Lab Results   Component Value Date    A1C 6.9 (A) 04/22/2016       INR RESULTS:  No results found for: INR        CC  No referring provider defined for this encounter.

## 2019-06-20 ENCOUNTER — OFFICE VISIT (OUTPATIENT)
Dept: CARDIOLOGY | Facility: CLINIC | Age: 84
End: 2019-06-20
Payer: MEDICARE

## 2019-06-20 VITALS
HEART RATE: 76 BPM | SYSTOLIC BLOOD PRESSURE: 110 MMHG | DIASTOLIC BLOOD PRESSURE: 48 MMHG | WEIGHT: 121.3 LBS | BODY MASS INDEX: 22.32 KG/M2 | HEIGHT: 62 IN

## 2019-06-20 DIAGNOSIS — I48.0 PAROXYSMAL ATRIAL FIBRILLATION (H): ICD-10-CM

## 2019-06-20 DIAGNOSIS — I10 BENIGN ESSENTIAL HYPERTENSION: ICD-10-CM

## 2019-06-20 DIAGNOSIS — I20.1 CORONARY VASOSPASM (H): Primary | ICD-10-CM

## 2019-06-20 PROCEDURE — 99214 OFFICE O/P EST MOD 30 MIN: CPT | Performed by: NURSE PRACTITIONER

## 2019-06-20 RX ORDER — TRAZODONE HYDROCHLORIDE 50 MG/1
50 TABLET, FILM COATED ORAL AT BEDTIME
COMMUNITY

## 2019-06-20 RX ORDER — NITROGLYCERIN 0.4 MG/1
0.4 TABLET SUBLINGUAL EVERY 5 MIN PRN
Qty: 25 TABLET | Refills: 3 | Status: SHIPPED | OUTPATIENT
Start: 2019-06-20 | End: 2020-09-15

## 2019-06-20 RX ORDER — LOSARTAN POTASSIUM 50 MG/1
50 TABLET ORAL
Qty: 90 TABLET | Refills: 3 | COMMUNITY
Start: 2019-06-20 | End: 2019-07-30

## 2019-06-20 ASSESSMENT — MIFFLIN-ST. JEOR: SCORE: 925.52

## 2019-06-20 NOTE — PATIENT INSTRUCTIONS
Move  Losartan to bedtime    See me back if dizzyness worsens    Otherwise see us back in 6 months with labs and echocardiogram

## 2019-06-20 NOTE — LETTER
6/20/2019    NOLBERTO DURON  [address]  [facility] [id number]    RE: Ralph Whitten       Dear Colleague,    I had the pleasure of seeing Ralph Whitten in the [facility].    History of Present Illness:      Ralph Whitten is a 88 year old female followed here by Dr. De Jesus. She is one of her most delightful patients and looks much younger than her stated age. She returns today for follow-up. She had a CVA in February of 2018 which started with dizziness.  When she got to the emergency room she had right leg weakness and was given lytic therapy.  It was felt that this was due to small vessel ischemic disease.  But now we have seen paroxysmal  atrial fibrillation on a follow up event recorder so it is certainly possible it could have been from an undetected arrhythmia.  Eliquis had been started for stroke prophylaxis.    She has a history of paroxysmal atrial tachycardia. Her [id number] score is 6 if you do not include vascular disease.  However she has an LAD lesion of approximately for 40% by CT angiogram so her [id number] could be 7 which gives her an 9% plus risk of stroke without higher level anticoagulation. We stopped Plavix that had been started post-CVA and began Eliquis 2.5mg twice daily based on age and weight. She remains on Aspirin and Celexa (some anti-platelet effect) but she stopped her Omega-3 fatty acids. She notes some bruising so will reduce ASA to every other day.    She  has Prinzmetal's angina but her chest discomfort has been quite stable.       She had an invasive angiogram in 2000 showing no significant coronary artery disease but confirmed diagnosis of Prinzmetal angina with hands placed into cold water. She has done well on diltiazem. Stress testing in 2009 was unremarkable for ischemia.     Based on her change in symptoms of left shoulder pain in 2017,  she underwent a CT coronary in May of 2017 showing a 25-40% stenosis  in the LAD, 25% proximal LAD with poor visualization of the distal LAD. The circumflex and right coronary had no stenosis. Total calcium score was 31. There was an incidental finding of a 5 mm right lower lung lower lobe lung nodule; repeat scan was done in May 2018 and showed a few new small nodules.  She does not likely meet criteria to have these rescreened and we sent the primary care physician these results to follow-up. 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       She has significant social stressors in her life. 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 This is limited their activity, socialization, and caused stress and anxiety for her.  She recently sold her house and moved to a senior building that has the ability to transition to assisted living or memory care. Her  currently is at [facility] with an MI and stents. He suffered a post procedure stroke with left sided weakness and now may have a GI bleed; he will be going to TCU for recovery. Her 58 year old daughter has been diagnosed with ovarian cancer and is undergoing chemotherapy followed by eventual surgery.     Blood pressures is  well controlled after increase in losartan however she notes slight orthostatic dizziness at times. She has about a 10mm orthostatic drop today and has been placed on Celexa as well. We talked about options and she will try moving Losartan to bedtime for now to see if that helps.    She is not aware of her atrial fibrillation when she has it. She had not had any palpitations or chest pain.     Last echocardiogram in 2017 noted 2+ TR and she has had variable levels of MR up to 1-2+ in the past. EF is preserved.    BMP: sodium 135 (previous 136; on Celexa now), potassium 4.0 BUN 21 Creatinine  1.12.  Lipid panel: total cholesterol 176 HDL 72 LDL 88 TG normal  Glucose is mildly elevated and she remains on Metformin.         Impression/Plan:       1. Prinzmetal angina currently under good control.   - her shoulder pain has responded to an injection of steroids and physical therapy via the orthopedic clinic. We will continue her medical management.  Continue ASA 81mg but every other day  -continue Diltiazem  2. Hypertension improved control   -wide pulse pressure  -continue losartan 50 mg per day but move to bedtime dosing to see if mild orthostasic symptoms improve; if not she will call and schedule a return visit.  3. Situational depression on Celexa--this is likely in part due to the stress being a caregiver for her ; this medication adds an additional blood thinner effect but she has been stable and had great benefit from this.Omega 3 was stopped when Eliquis was started.  4.Parosxymal atrial fibrillation-asymptomatic with elevated [id number] score.    -Continue Eliquis 2.5mg twice daily; if this becomes unaffordable, she would be willing to change to Warfarin and have this managed at [facility] as her  takes warfarin and goes to the INR clinic there.  I have provided her with some samples  5. Dyslipidemia treated with LDL 88--continue pravastatin 40 mg day.  6. Mild valvular heart disease; moderate TR; variable MR--check echo at her follow up in 6 months     As, always it has been pleasure.     She will see Dr. De Jesus in 6 months    Greater than 50% of this 30 minute visit was spent in counseling    Gwyn Camara, MSN, APRN-BC, CNP  Cardiology    Orders Placed This Encounter   Procedures     Basic metabolic panel     Follow-Up with Cardiologist     Echocardiogram Complete     Orders Placed This Encounter   Medications     traZODone (DESYREL) 50 MG tablet     Sig: Take 50 mg by mouth At Bedtime     losartan (COZAAR) 50 MG tablet     Sig: Take 1 tablet (50 mg) by mouth     Dispense:  90  tablet     Refill:  3     apixaban ANTICOAGULANT (ELIQUIS) 2.5 MG tablet     Sig: Take 1 tablet (2.5 mg) by mouth 2 times daily     Dispense:  60 tablet     Refill:  11     nitroGLYcerin (NITROSTAT) 0.4 MG sublingual tablet     Sig: Place 1 tablet (0.4 mg) under the tongue every 5 minutes as needed for chest pain     Dispense:  25 tablet     Refill:  3     Medications Discontinued During This Encounter   Medication Reason     losartan (COZAAR) 50 MG tablet Reorder     apixaban ANTICOAGULANT (ELIQUIS) 2.5 MG tablet Reorder     nitroglycerin (NITROSTAT) 0.4 MG sublingual tablet Reorder         Encounter Diagnoses   Name Primary?     Coronary vasospasm (H) Yes     Benign essential hypertension      Paroxysmal atrial fibrillation (H)        CURRENT MEDICATIONS:  Current Outpatient Medications   Medication Sig Dispense Refill     apixaban ANTICOAGULANT (ELIQUIS) 2.5 MG tablet Take 1 tablet (2.5 mg) by mouth 2 times daily 60 tablet 11     aspirin 81 MG tablet Take 81 mg by mouth every other day       Cholecalciferol (VITAMIN D3 PO) Take by mouth daily       citalopram (CELEXA) 10 MG tablet Take 10 mg by mouth daily       diltiazem (CARDIZEM) 60 MG tablet Take 1 tablet (60 mg) by mouth as needed Take 1 tablet only as needed for increased palpitatons 10 tablet 2     diltiazem 240 MG 24 hr ER capsule Take 240 mg by mouth daily       levothyroxine (SYNTHROID) 75 MCG tablet Take 75 mcg by mouth daily       LORAZEPAM PO Take 0.5 mg by mouth daily as needed        losartan (COZAAR) 50 MG tablet Take 1 tablet (50 mg) by mouth 90 tablet 3     metFORMIN (GLUCOPHAGE) 500 MG tablet Take 500 mg by mouth 2 times daily (with meals)       Multiple Vitamin (DAILY MULTIVITAMIN PO) Take by mouth daily        nitroGLYcerin (NITROSTAT) 0.4 MG sublingual tablet Place 1 tablet (0.4 mg) under the tongue every 5 minutes as needed for chest pain 25 tablet 3     OMEPRAZOLE PO Take 20 mg by mouth       pravastatin (PRAVACHOL) 40 MG tablet Take 1  tablet (40 mg) by mouth At Bedtime 90 tablet 3     traZODone (DESYREL) 50 MG tablet Take 50 mg by mouth At Bedtime         ALLERGIES     Allergies   Allergen Reactions     Diovan [Valsartan] Other (See Comments) and GI Disturbance     Arthralgia     Hmg-Coa-R Inhibitors Other (See Comments)     Statin Medications give patient Myalgias---simvastatin     Sulfa Drugs Rash       PAST MEDICAL HISTORY:  Past Medical History:   Diagnosis Date     CVA (cerebral vascular accident) (H) 02/2018    ANW-details unk, treated with t-pa (dizziness presenting sx)     Degenerative disk disease     C 6/7     Diabetes mellitus (H)      Diabetic neuropathy (H)      Essential hypertension, benign      Generalized osteoarthrosis, unspecified site (aka ARTHRITIS)     osteoporosis and osteoarthritis     Hyperlipidemia      Hypothyroid      Mitral valve disorder     mild/mod     PAT (paroxysmal atrial tachycardia) (H)      Prinzmetal angina (H)     no cad 2000     PUD (peptic ulcer disease)      Rheumatic fever      Tricuspid regurgitation     mod     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       PAST SURGICAL HISTORY:  Past Surgical History:   Procedure Laterality Date     APPENDECTOMY       C RAD RESEC TONSIL/PILLARS       CORONARY ANGIOGRAPHY ADULT ORDER  2002    normal Coronary arteries, Vasospasms     HYSTERECTOMY      total     rotator cuff surgery      right     TONSILLECTOMY         FAMILY HISTORY:  Family History   Problem Relation Age of Onset     Coronary Artery Disease Mother      Myocardial Infarction Mother      Heart Failure Father      Unknown/Adopted Maternal Grandmother      Unknown/Adopted Maternal Grandfather      Unknown/Adopted Paternal Grandmother      Unknown/Adopted Paternal Grandfather        SOCIAL HISTORY:  Social History     Socioeconomic History     Marital status:      Spouse name: None     Number of children: None     Years of education: None     Highest education  level: None   Occupational History     None   Social Needs     Financial resource strain: None     Food insecurity:     Worry: None     Inability: None     Transportation needs:     Medical: None     Non-medical: None   Tobacco Use     Smoking status: Never Smoker     Smokeless tobacco: Never Used   Substance and Sexual Activity     Alcohol use: Yes     Comment: occ. wine     Drug use: None     Sexual activity: None   Lifestyle     Physical activity:     Days per week: None     Minutes per session: None     Stress: None   Relationships     Social connections:     Talks on phone: None     Gets together: None     Attends Cheondo[employer] service: None     Active member of club or organization: None     Attends meetings of clubs or organizations: None     Relationship status: None     Intimate partner violence:     Fear of current or ex partner: None     Emotionally abused: None     Physically abused: None     Forced sexual activity: None   Other Topics Concern     Parent/sibling w/ CABG, MI or angioplasty before 65F 55M? No      Service Not Asked     Blood Transfusions Not Asked     Caffeine Concern No     Comment: coffee: 3-4 cups a day     Occupational Exposure Not Asked     Hobby Hazards Not Asked     Sleep Concern No     Stress Concern No     Weight Concern No     Special Diet Yes     Comment: diabetic diet, healthy     Back Care Not Asked     Exercise Yes     Comment: walking x3 a week     Bike Helmet Not Asked     Seat Belt Yes     Self-Exams Not Asked   Social History Narrative     None       Review of Systems:  Skin:  Positive for bruising     Eyes:  Positive for glasses    ENT:  Negative      Respiratory:  Negative       Cardiovascular:    Positive for;dizziness;edema swelling in ankles in the morning only  Gastroenterology: Positive for heartburn;reflux    Genitourinary:  Negative      Musculoskeletal:  Positive for joint pain;arthritis;joint swelling    Neurologic:  Positive for headaches;numbness or  "tingling of feet;numbness or tingling of hands;stroke at night  Psychiatric:  Negative      Heme/Lymph/Imm:  Positive for allergies;easy bruising    Endocrine:  Positive for thyroid disorder;diabetes      Physical Exam:  Vitals: /58   Pulse 76   Ht 1.562 m (5' 1.5\")   Wt 55 kg (121 lb 4.8 oz)   BMI 22.55 kg/m       Constitutional:  cooperative, alert and oriented, well developed, well nourished, in no acute distress appears younger than stated age      Skin:  warm and dry to the touch, no apparent skin lesions or masses noted          Head:  normocephalic, no masses or lesions        Eyes:  pupils equal and round, conjunctivae and lids unremarkable, sclera white, no xanthalasma, EOMS intact, no nystagmus        Lymph:No Cervical lymphadenopathy present     ENT:  no pallor or cyanosis, dentition good        Neck:  carotid pulses are full and equal bilaterally, JVP normal, no carotid bruit        Respiratory:  normal breath sounds, clear to auscultation, normal A-P diameter, normal symmetry, normal respiratory excursion, no use of accessory muscles         Cardiac: regular rhythm, normal S1/S2, no S3 or S4, apical impulse not displaced, no murmurs, gallops or rubs       grade 1;systolic murmur     minimal syst murmru at apex ( echo mild mr, mild+ tr)  pulses full and equal, no bruits auscultated                                        GI:  abdomen soft, non-tender, BS normoactive, no mass, no HSM, no bruits        Extremities and Muscular Skeletal:  no deformities, clubbing, cyanosis, erythema observed;no edema   bilateral LE edema;trace;R greater than L          Neurological:  no gross motor deficits        Psych:  Alert and Oriented x 3      Recent Lab Results:  LIPID RESULTS:  Lab Results   Component Value Date    CHOL 176 06/19/2019    HDL 72 06/19/2019    LDL 88 06/19/2019    TRIG 78 06/19/2019    CHOLHDLRATIO 3.1 01/12/2011       LIVER ENZYME RESULTS:  Lab Results   Component Value Date    AST 23 " 04/22/2016    ALT 9 06/19/2019       CBC RESULTS:  Lab Results   Component Value Date    WBC 7.0 03/31/2015    RBC 3.97 03/31/2015    HGB 12.1 03/31/2015    HCT 36.7 03/31/2015    MCV 92 03/31/2015    MCH 30.5 03/31/2015    MCHC 33 03/31/2015    RDW 13.2 03/31/2015     03/31/2015       BMP RESULTS:  Lab Results   Component Value Date     (L) 06/19/2019    POTASSIUM 4.0 06/19/2019    CHLORIDE 100 06/19/2019    CO2 26 06/19/2019    ANIONGAP 13 06/19/2019     (H) 06/19/2019    BUN 21 06/19/2019    CR 1.12 06/19/2019    GFRESTIMATED 46 (L) 06/19/2019    GFRESTBLACK 56 (L) 06/19/2019    CHUN 9.7 06/19/2019        A1C RESULTS:  Lab Results   Component Value Date    A1C 6.9 (A) 04/22/2016       INR RESULTS:  No results found for: INR        CC  No referring provider defined for this encounter.                  Thank you for allowing me to participate in the care of your patient.      Sincerely,     ANDREZ Arcos CNP     [facility]    cc:   No referring provider defined for this encounter.

## 2019-06-20 NOTE — LETTER
6/20/2019    NOLBERTO DURON  [address]  [facility] [address]    RE: Ralph Whitten       Dear Colleague,    I had the pleasure of seeing Ralph Whitten in the [facility].    History of Present Illness:      Ralph Whitten is a 88 year old female followed here by Dr. De Jesus. She is one of her most delightful patients and looks much younger than her stated age. She returns today for follow-up. She had a CVA in February of 2018 which started with dizziness.  When she got to the emergency room she had right leg weakness and was given lytic therapy.  It was felt that this was due to small vessel ischemic disease.  But now we have seen paroxysmal  atrial fibrillation on a follow up event recorder so it is certainly possible it could have been from an undetected arrhythmia.  Eliquis had been started for stroke prophylaxis.    She has a history of paroxysmal atrial tachycardia. Her [id number] score is 6 if you do not include vascular disease.  However she has an LAD lesion of approximately for 40% by CT angiogram so her [id number] could be 7 which gives her an 9% plus risk of stroke without higher level anticoagulation. We stopped Plavix that had been started post-CVA and began Eliquis 2.5mg twice daily based on age and weight. She remains on Aspirin and Celexa (some anti-platelet effect) but she stopped her Omega-3 fatty acids. She notes some bruising so will reduce ASA to every other day.    She  has Prinzmetal's angina but her chest discomfort has been quite stable.       She had an invasive angiogram in 2000 showing no significant coronary artery disease but confirmed diagnosis of Prinzmetal angina with hands placed into cold water. She has done well on diltiazem. Stress testing in 2009 was unremarkable for ischemia.     Based on her change in symptoms of left shoulder pain in 2017,  she underwent a CT coronary in May of 2017 showing a 25-40%  stenosis in the LAD, 25% proximal LAD with poor visualization of the distal LAD. The circumflex and right coronary had no stenosis. Total calcium score was 31. There was an incidental finding of a 5 mm right lower lung lower lobe lung nodule; repeat scan was done in May 2018 and showed a few new small nodules.  She does not likely meet criteria to have these rescreened and we sent the primary care physician these results to follow-up. 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       She has significant social stressors in her life. 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 This is limited their activity, socialization, and caused stress and anxiety for her.  She recently sold her house and moved to a senior building that has the ability to transition to assisted living or memory care. Her  currently is at [facility] with an MI and stents. He suffered a post procedure stroke with left sided weakness and now may have a GI bleed; he will be going to TCU for recovery. Her 58 year old daughter has been diagnosed with ovarian cancer and is undergoing chemotherapy followed by eventual surgery.     Blood pressures is  well controlled after increase in losartan however she notes slight orthostatic dizziness at times. She has about a 10mm orthostatic drop today and has been placed on Celexa as well. We talked about options and she will try moving Losartan to bedtime for now to see if that helps.    She is not aware of her atrial fibrillation when she has it. She had not had any palpitations or chest pain.     Last echocardiogram in 2017 noted 2+ TR and she has had variable levels of MR up to 1-2+ in the past. EF is preserved.    BMP: sodium 135 (previous 136; on Celexa now), potassium 4.0 BUN 21  Creatinine 1.12.  Lipid panel: total cholesterol 176 HDL 72 LDL 88 TG normal  Glucose is mildly elevated and she remains on Metformin.         Impression/Plan:       1. Prinzmetal angina currently under good control.   - her shoulder pain has responded to an injection of steroids and physical therapy via the orthopedic clinic. We will continue her medical management.  Continue ASA 81mg but every other day  -continue Diltiazem  2. Hypertension improved control   -wide pulse pressure  -continue losartan 50 mg per day but move to bedtime dosing to see if mild orthostasic symptoms improve; if not she will call and schedule a return visit.  3. Situational depression on Celexa--this is likely in part due to the stress being a caregiver for her ; this medication adds an additional blood thinner effect but she has been stable and had great benefit from this.Omega 3 was stopped when Eliquis was started.  4.Parosxymal atrial fibrillation-asymptomatic with elevated [id number] score.    -Continue Eliquis 2.5mg twice daily; if this becomes unaffordable, she would be willing to change to Warfarin and have this managed at [facility] as her  takes warfarin and goes to the INR clinic there.  I have provided her with some samples  5. Dyslipidemia treated with LDL 88--continue pravastatin 40 mg day.  6. Mild valvular heart disease; moderate TR; variable MR--check echo at her follow up in 6 months     As, always it has been pleasure.     She will see Dr. De Jesus in 6 months    Greater than 50% of this 30 minute visit was spent in counseling    Gwyn Camara, MSN, APRN-BC, CNP  Cardiology    Orders Placed This Encounter   Procedures     Basic metabolic panel     Follow-Up with Cardiologist     Echocardiogram Complete     Orders Placed This Encounter   Medications     traZODone (DESYREL) 50 MG tablet     Sig: Take 50 mg by mouth At Bedtime     losartan (COZAAR) 50 MG tablet     Sig: Take 1 tablet (50 mg) by mouth      Dispense:  90 tablet     Refill:  3     apixaban ANTICOAGULANT (ELIQUIS) 2.5 MG tablet     Sig: Take 1 tablet (2.5 mg) by mouth 2 times daily     Dispense:  60 tablet     Refill:  11     nitroGLYcerin (NITROSTAT) 0.4 MG sublingual tablet     Sig: Place 1 tablet (0.4 mg) under the tongue every 5 minutes as needed for chest pain     Dispense:  25 tablet     Refill:  3     Medications Discontinued During This Encounter   Medication Reason     losartan (COZAAR) 50 MG tablet Reorder     apixaban ANTICOAGULANT (ELIQUIS) 2.5 MG tablet Reorder     nitroglycerin (NITROSTAT) 0.4 MG sublingual tablet Reorder         Encounter Diagnoses   Name Primary?     Coronary vasospasm (H) Yes     Benign essential hypertension      Paroxysmal atrial fibrillation (H)        CURRENT MEDICATIONS:  Current Outpatient Medications   Medication Sig Dispense Refill     apixaban ANTICOAGULANT (ELIQUIS) 2.5 MG tablet Take 1 tablet (2.5 mg) by mouth 2 times daily 60 tablet 11     aspirin 81 MG tablet Take 81 mg by mouth every other day       Cholecalciferol (VITAMIN D3 PO) Take by mouth daily       citalopram (CELEXA) 10 MG tablet Take 10 mg by mouth daily       diltiazem (CARDIZEM) 60 MG tablet Take 1 tablet (60 mg) by mouth as needed Take 1 tablet only as needed for increased palpitatons 10 tablet 2     diltiazem 240 MG 24 hr ER capsule Take 240 mg by mouth daily       levothyroxine (SYNTHROID) 75 MCG tablet Take 75 mcg by mouth daily       LORAZEPAM PO Take 0.5 mg by mouth daily as needed        losartan (COZAAR) 50 MG tablet Take 1 tablet (50 mg) by mouth 90 tablet 3     metFORMIN (GLUCOPHAGE) 500 MG tablet Take 500 mg by mouth 2 times daily (with meals)       Multiple Vitamin (DAILY MULTIVITAMIN PO) Take by mouth daily        nitroGLYcerin (NITROSTAT) 0.4 MG sublingual tablet Place 1 tablet (0.4 mg) under the tongue every 5 minutes as needed for chest pain 25 tablet 3     OMEPRAZOLE PO Take 20 mg by mouth       pravastatin (PRAVACHOL) 40 MG  tablet Take 1 tablet (40 mg) by mouth At Bedtime 90 tablet 3     traZODone (DESYREL) 50 MG tablet Take 50 mg by mouth At Bedtime         ALLERGIES     Allergies   Allergen Reactions     Diovan [Valsartan] Other (See Comments) and GI Disturbance     Arthralgia     Hmg-Coa-R Inhibitors Other (See Comments)     Statin Medications give patient Myalgias---simvastatin     Sulfa Drugs Rash       PAST MEDICAL HISTORY:  Past Medical History:   Diagnosis Date     CVA (cerebral vascular accident) (H) 02/2018    ANW-details unk, treated with t-pa (dizziness presenting sx)     Degenerative disk disease     C 6/7     Diabetes mellitus (H)      Diabetic neuropathy (H)      Essential hypertension, benign      Generalized osteoarthrosis, unspecified site (aka ARTHRITIS)     osteoporosis and osteoarthritis     Hyperlipidemia      Hypothyroid      Mitral valve disorder     mild/mod     PAT (paroxysmal atrial tachycardia) (H)      Prinzmetal angina (H)     no cad 2000     PUD (peptic ulcer disease)      Rheumatic fever      Tricuspid regurgitation     mod     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       PAST SURGICAL HISTORY:  Past Surgical History:   Procedure Laterality Date     APPENDECTOMY       C RAD RESEC TONSIL/PILLARS       CORONARY ANGIOGRAPHY ADULT ORDER  2002    normal Coronary arteries, Vasospasms     HYSTERECTOMY      total     rotator cuff surgery      right     TONSILLECTOMY         FAMILY HISTORY:  Family History   Problem Relation Age of Onset     Coronary Artery Disease Mother      Myocardial Infarction Mother      Heart Failure Father      Unknown/Adopted Maternal Grandmother      Unknown/Adopted Maternal Grandfather      Unknown/Adopted Paternal Grandmother      Unknown/Adopted Paternal Grandfather        SOCIAL HISTORY:  Social History     Socioeconomic History     Marital status:      Spouse name: None     Number of children: None     Years of education: None     Highest  education level: None   Occupational History     None   Social Needs     Financial resource strain: None     Food insecurity:     Worry: None     Inability: None     Transportation needs:     Medical: None     Non-medical: None   Tobacco Use     Smoking status: Never Smoker     Smokeless tobacco: Never Used   Substance and Sexual Activity     Alcohol use: Yes     Comment: occ. wine     Drug use: None     Sexual activity: None   Lifestyle     Physical activity:     Days per week: None     Minutes per session: None     Stress: None   Relationships     Social connections:     Talks on phone: None     Gets together: None     Attends Church service: None     Active member of club or organization: None     Attends meetings of clubs or organizations: None     Relationship status: None     Intimate partner violence:     Fear of current or ex partner: None     Emotionally abused: None     Physically abused: None     Forced sexual activity: None   Other Topics Concern     Parent/sibling w/ CABG, MI or angioplasty before 65F 55M? No      Service Not Asked     Blood Transfusions Not Asked     Caffeine Concern No     Comment: coffee: 3-4 cups a day     Occupational Exposure Not Asked     Hobby Hazards Not Asked     Sleep Concern No     Stress Concern No     Weight Concern No     Special Diet Yes     Comment: diabetic diet, healthy     Back Care Not Asked     Exercise Yes     Comment: walking x3 a week     Bike Helmet Not Asked     Seat Belt Yes     Self-Exams Not Asked   Social History Narrative     None       Review of Systems:  Skin:  Positive for bruising     Eyes:  Positive for glasses    ENT:  Negative      Respiratory:  Negative       Cardiovascular:    Positive for;dizziness;edema swelling in ankles in the morning only  Gastroenterology: Positive for heartburn;reflux    Genitourinary:  Negative      Musculoskeletal:  Positive for joint pain;arthritis;joint swelling    Neurologic:  Positive for  "headaches;numbness or tingling of feet;numbness or tingling of hands;stroke at night  Psychiatric:  Negative      Heme/Lymph/Imm:  Positive for allergies;easy bruising    Endocrine:  Positive for thyroid disorder;diabetes      Physical Exam:  Vitals: /58   Pulse 76   Ht 1.562 m (5' 1.5\")   Wt 55 kg (121 lb 4.8 oz)   BMI 22.55 kg/m       Constitutional:  cooperative, alert and oriented, well developed, well nourished, in no acute distress appears younger than stated age      Skin:  warm and dry to the touch, no apparent skin lesions or masses noted          Head:  normocephalic, no masses or lesions        Eyes:  pupils equal and round, conjunctivae and lids unremarkable, sclera white, no xanthalasma, EOMS intact, no nystagmus        Lymph:No Cervical lymphadenopathy present     ENT:  no pallor or cyanosis, dentition good        Neck:  carotid pulses are full and equal bilaterally, JVP normal, no carotid bruit        Respiratory:  normal breath sounds, clear to auscultation, normal A-P diameter, normal symmetry, normal respiratory excursion, no use of accessory muscles         Cardiac: regular rhythm, normal S1/S2, no S3 or S4, apical impulse not displaced, no murmurs, gallops or rubs       grade 1;systolic murmur     minimal syst murmru at apex ( echo mild mr, mild+ tr)  pulses full and equal, no bruits auscultated                                        GI:  abdomen soft, non-tender, BS normoactive, no mass, no HSM, no bruits        Extremities and Muscular Skeletal:  no deformities, clubbing, cyanosis, erythema observed;no edema   bilateral LE edema;trace;R greater than L          Neurological:  no gross motor deficits        Psych:  Alert and Oriented x 3      Recent Lab Results:  LIPID RESULTS:  Lab Results   Component Value Date    CHOL 176 06/19/2019    HDL 72 06/19/2019    LDL 88 06/19/2019    TRIG 78 06/19/2019    CHOLHDLRATIO 3.1 01/12/2011       LIVER ENZYME RESULTS:  Lab Results   Component Value " Date    AST 23 04/22/2016    ALT 9 06/19/2019       CBC RESULTS:  Lab Results   Component Value Date    WBC 7.0 03/31/2015    RBC 3.97 03/31/2015    HGB 12.1 03/31/2015    HCT 36.7 03/31/2015    MCV 92 03/31/2015    MCH 30.5 03/31/2015    MCHC 33 03/31/2015    RDW 13.2 03/31/2015     03/31/2015       BMP RESULTS:  Lab Results   Component Value Date     (L) 06/19/2019    POTASSIUM 4.0 06/19/2019    CHLORIDE 100 06/19/2019    CO2 26 06/19/2019    ANIONGAP 13 06/19/2019     (H) 06/19/2019    BUN 21 06/19/2019    CR 1.12 06/19/2019    GFRESTIMATED 46 (L) 06/19/2019    GFRESTBLACK 56 (L) 06/19/2019    CHUN 9.7 06/19/2019        A1C RESULTS:  Lab Results   Component Value Date    A1C 6.9 (A) 04/22/2016       INR RESULTS:  No results found for: INR        CC  No referring provider defined for this encounter.              Thank you for allowing me to participate in the care of your patient.  Sincerely,     ANDREZ Arcos CNP     [facility]

## 2019-07-25 ENCOUNTER — CARE COORDINATION (OUTPATIENT)
Dept: CARDIOLOGY | Facility: CLINIC | Age: 84
End: 2019-07-25

## 2019-07-25 NOTE — PROGRESS NOTES
That would be fine  I can see her sometime  Even if after my time off if she is stable or if I have something left in the next couple weeks before I leave    If I have filled up--talk to me as I would add her in somewhere

## 2019-07-25 NOTE — PROGRESS NOTES
Received VM from pt who reports she was at [facility] earlier in July with a mild TIA and BP issues.  She is scheduled for OV with Dr. De Jesus on 9/10/19 but would like to know if PEE Gage thinks she should follow up sooner, as she was told to follow up with cardiology after her hospital stay.      Per chart review in Care Everywhere, pt was at [facility] 7/2-7/5/19 with orthostatic hypotension and ?TIA after being admitted for double vision and weakness. Losartan was stopped and her diltiazem was decreased from 240mg daily to 180mg daily with improvement in her symptoms.  Amaryl was also stopped due to low blood sugars and she was discharged home. She had follow up with her PCP, Dr. Berry, on 7/10/19 and no further med changes were made.      Routed to PEE Gage for review.     DONNY Rosenbaum 5:04 PM 7/25/2019

## 2019-07-26 NOTE — PROGRESS NOTES
Next opening with [facility] is 8/20/19, placed pt on schedule for this date. Called pt, no answer. LVM letting pt know that JARRETT would like to see her prior to 9/10/19 visit with Dr. De Jesus. Explained next opening 8/20/19, and I have placed pt on the schedule at 12:30pm that day. Asked pt to call back to discuss and see how she is feeling. DONNY Larson 10:43 AM 07/26/19

## 2019-07-29 NOTE — PROGRESS NOTES
Pt returned call, she unfortunately cannot come on 8/20. No other openings with [facility]K until after already scheduled visit 9/10/19 with Dr. De Jesus. Discussed this with pt, she is feeling fine. She reports things seem to be pretty good since hospital stay. Denies dizziness. She does not check BP at home. Pt reports she is still a bit more tired than usual, but not bad. Told pt I would message [facility]K to see if there is anywhere she can find in her schedule and call pt back. DONNY Larson 1:40 PM 07/29/19

## 2019-07-29 NOTE — PROGRESS NOTES
I had a cancelation today 7-30 at 1230 if she can make it or block 1230 and put her in at 310 otherwise no    I am gone and rounding    If she cannot make 7-30 keep September with Elsie

## 2019-07-30 ENCOUNTER — OFFICE VISIT (OUTPATIENT)
Dept: CARDIOLOGY | Facility: CLINIC | Age: 84
End: 2019-07-30
Payer: MEDICARE

## 2019-07-30 VITALS
DIASTOLIC BLOOD PRESSURE: 58 MMHG | BODY MASS INDEX: 22.8 KG/M2 | WEIGHT: 123.9 LBS | SYSTOLIC BLOOD PRESSURE: 110 MMHG | HEART RATE: 82 BPM | HEIGHT: 62 IN

## 2019-07-30 DIAGNOSIS — I47.10 PAROXYSMAL SUPRAVENTRICULAR TACHYCARDIA (H): ICD-10-CM

## 2019-07-30 DIAGNOSIS — I95.1 ORTHOSTASIS: Primary | ICD-10-CM

## 2019-07-30 PROCEDURE — 99214 OFFICE O/P EST MOD 30 MIN: CPT | Performed by: NURSE PRACTITIONER

## 2019-07-30 RX ORDER — DILTIAZEM HYDROCHLORIDE 180 MG/1
180 CAPSULE, EXTENDED RELEASE ORAL DAILY
Qty: 90 CAPSULE | Refills: 3 | Status: SHIPPED | OUTPATIENT
Start: 2019-07-30 | End: 2020-09-15

## 2019-07-30 RX ORDER — DILTIAZEM HYDROCHLORIDE 180 MG/1
180 CAPSULE, EXTENDED RELEASE ORAL DAILY
Qty: 90 CAPSULE | Refills: 3 | COMMUNITY
Start: 2019-07-30 | End: 2019-07-30

## 2019-07-30 RX ORDER — DILTIAZEM HCL 60 MG
60 TABLET ORAL PRN
Qty: 10 TABLET | Refills: 3 | Status: SHIPPED | OUTPATIENT
Start: 2019-07-30 | End: 2020-09-15

## 2019-07-30 ASSESSMENT — MIFFLIN-ST. JEOR: SCORE: 937.32

## 2019-07-30 NOTE — PROGRESS NOTES
History of Present Illness:      Ralph Whitten is a 88 year old female followed here by Dr. De Jesus. She is one of her most delightful patients and looks much younger than her stated age. She returns today for follow-up on an admission to [facility] for double vision, dizzness and was found to a TIA, no stroke by head MRI and low blood sugars with orthostatic hyptoension. She has been placed on Amayrl recently which was stopped. Losartan was discontinued and Diltiazem was reduced to 180gm per day. She has not had breakthrough chest pain on these changes. No atrial fibrillation was identified.    She had a CVA in February of 2018 which started with dizziness.  When she got to the emergency room she had right leg weakness and was given lytic therapy.  It was felt that this was due to small vessel ischemic disease.  But now we have seen paroxysmal  atrial fibrillation on a follow up event recorder so it is certainly possible it could have been from an undetected arrhythmia. Eliquis had been started for stroke prophylaxis then and she continues on this.     She has a history of paroxysmal atrial tachycardia. Her [id number] score is 6 if you do not include vascular disease.  However she has an LAD lesion of approximately for 40% by CT angiogram so her [id number] could be 7 which gives her an 9% plus risk of stroke without higher level anticoagulation. We stopped Plavix that had been started post-CVA and began Eliquis 2.5mg twice daily based on age and weight. She remains on Aspirin and Celexa (some anti-platelet effect) but she stopped her Omega-3 fatty acids. She notes some bruising so I reduced ASA to every other day.     She  has Prinzmetal's angina but her chest discomfort has been quite stable. She has short acting Diltiazem available which she has not needed. I have refilled this today.      She had an invasive angiogram in 2000 showing no significant coronary artery disease but confirmed  diagnosis of Prinzmetal angina with hands placed into cold water. Stress testing in 2009 was unremarkable for ischemia.      Based on her change in symptoms of left shoulder pain in 2017,  she underwent a CT coronary in May of 2017 showing a 25-40% stenosis in the LAD, 25% proximal LAD with poor visualization of the distal LAD. The circumflex and right coronary had no stenosis. Total calcium score was 31. There was an incidental finding of a 5 mm right lower lung lower lobe lung nodule; repeat scan was done in May 2018 and showed a few new small nodules. This report was sent to [facility] for further follow up.      She has significant social stressors in her life. 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 This is limited their activity, socialization, and caused stress and anxiety for her.  She recently sold her house and moved to a senior building that has the ability to transition to assisted living or memory care. Her  recently had a large MI and stents. He suffered a post procedure stroke with left sided weakness and GI bleeding; he is in TCU recovering. Her 58 year old daughter has been diagnosed with ovarian cancer and is undergoing chemotherapy and will undergo extensive abdominal surgery for this at [facility] on 8-. Prior to her recent admission, Ralph had not been sleeping or eating well due to all of these ongoing issues. Her weight is stable.     Last echocardiogram in 2017 noted 2+ TR and she has had variable levels of MR up to 1-2+ in the past. EF is preserved. She has a visit here with Dr. De Jesus in September and I will order a repeat echo prior to that visit.     [facility] 140 potassium 4.6 BUN 16 creatinine 0.94    Lipid panel 6-2019: total cholesterol 176 HDL 72 LDL 88 TG normal  Glucose is  mildly elevated and is a know diabetic    BP today off losartan is controlled. Sitting BP is 118/62, standing 120/60         Impression/Plan:       1. Prinzmetal angina currently under good control.   - her shoulder pain has responded to an injection of steroids and physical therapy via the orthopedic clinic. We will continue her medical management.  Continue ASA 81mg but every other day  -continue Diltiazem at lower dose with 60mg short-acting for any breakthrough    2. Hypertension by history with orthostasis  -wide pulse pressure  -remain off of losartan    3. Situational depression on Celexa--this is likely in part due to the stress being a caregiver for her ; this medication adds an additional blood thinner effect but she has been stable and had great benefit from this. Omega 3 was stopped when Eliquis was started.    4.Parosxymal atrial fibrillation-asymptomatic with elevated [id number] score.    -Continue Eliquis 2.5mg twice daily; if this becomes unaffordable, she would be willing to change to Warfarin and have this managed at [facility] as her  takes warfarin and goes to the INR clinic there.  -no atrial fibrillation noted with her recent admission    5. Dyslipidemia treated with LDL 88--continue pravastatin 40 mg day.    6. Mild valvular heart disease; moderate TR; variable MR--check echo prior to her visit in September    7.recent admission for TIA/possible hypoglycemia/orthostasis  -see discussion above  -stay off losartan  -no p. afib identified     As, always it has been pleasure.   Greater than 50% of this 30 minute visit was spent in counseling           Gwyn Camara, MSN, APRN-BC, CNP  Cardiology    Orders Placed This Encounter   Procedures     Echocardiogram Complete     Orders Placed This Encounter   Medications     DISCONTD: diltiazem ER (DILT-XR) 180 MG 24 hr capsule     Sig: Take 1 capsule (180 mg) by mouth daily     Dispense:  90 capsule     Refill:  3     diltiazem (CARDIZEM)  60 MG tablet     Sig: Take 1 tablet (60 mg) by mouth as needed (breakthrough chest pain) Take 1 tablet only as needed for increased palpitatons     Dispense:  10 tablet     Refill:  3     diltiazem ER (DILT-XR) 180 MG 24 hr capsule     Sig: Take 1 capsule (180 mg) by mouth daily     Dispense:  90 capsule     Refill:  3     Medications Discontinued During This Encounter   Medication Reason     losartan (COZAAR) 50 MG tablet      diltiazem 240 MG 24 hr ER capsule      diltiazem (CARDIZEM) 60 MG tablet Reorder     diltiazem ER (DILT-XR) 180 MG 24 hr capsule Reorder         Encounter Diagnosis   Name Primary?     Paroxysmal supraventricular tachycardia (H)        CURRENT MEDICATIONS:  Current Outpatient Medications   Medication Sig Dispense Refill     apixaban ANTICOAGULANT (ELIQUIS) 2.5 MG tablet Take 1 tablet (2.5 mg) by mouth 2 times daily 60 tablet 11     aspirin 81 MG tablet Take 81 mg by mouth every other day       Cholecalciferol (VITAMIN D3 PO) Take by mouth daily       citalopram (CELEXA) 10 MG tablet Take 10 mg by mouth daily       diltiazem (CARDIZEM) 60 MG tablet Take 1 tablet (60 mg) by mouth as needed (breakthrough chest pain) Take 1 tablet only as needed for increased palpitatons 10 tablet 3     diltiazem ER (DILT-XR) 180 MG 24 hr capsule Take 1 capsule (180 mg) by mouth daily 90 capsule 3     levothyroxine (SYNTHROID) 75 MCG tablet Take 50 mcg by mouth daily        LORAZEPAM PO Take 0.5 mg by mouth daily as needed        metFORMIN (GLUCOPHAGE) 500 MG tablet Take 500 mg by mouth daily (with breakfast)        Multiple Vitamin (DAILY MULTIVITAMIN PO) Take by mouth daily        nitroGLYcerin (NITROSTAT) 0.4 MG sublingual tablet Place 1 tablet (0.4 mg) under the tongue every 5 minutes as needed for chest pain 25 tablet 3     OMEPRAZOLE PO Take 20 mg by mouth       pravastatin (PRAVACHOL) 40 MG tablet Take 1 tablet (40 mg) by mouth At Bedtime 90 tablet 3     traZODone (DESYREL) 50 MG tablet Take 50 mg by mouth  At Bedtime         ALLERGIES     Allergies   Allergen Reactions     Diovan [Valsartan] Other (See Comments) and GI Disturbance     Arthralgia     Hmg-Coa-R Inhibitors Other (See Comments)     Statin Medications give patient Myalgias---simvastatin     Sulfa Drugs Rash       PAST MEDICAL HISTORY:  Past Medical History:   Diagnosis Date     CVA (cerebral vascular accident) (H) 02/2018    ANW-details unk, treated with t-pa (dizziness presenting sx)     Degenerative disk disease     C 6/7     Diabetes mellitus (H)      Diabetic neuropathy (H)      Essential hypertension, benign      Generalized osteoarthrosis, unspecified site (aka ARTHRITIS)     osteoporosis and osteoarthritis     Hyperlipidemia      Hypothyroid      Mitral valve disorder     mild/mod     PAT (paroxysmal atrial tachycardia) (H)      Prinzmetal angina (H)     no cad 2000     PUD (peptic ulcer disease)      Rheumatic fever      Tricuspid regurgitation     mod     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       PAST SURGICAL HISTORY:  Past Surgical History:   Procedure Laterality Date     APPENDECTOMY       C RAD RESEC TONSIL/PILLARS       CORONARY ANGIOGRAPHY ADULT ORDER  2002    normal Coronary arteries, Vasospasms     HYSTERECTOMY      total     rotator cuff surgery      right     TONSILLECTOMY         FAMILY HISTORY:  Family History   Problem Relation Age of Onset     Coronary Artery Disease Mother      Myocardial Infarction Mother      Heart Failure Father      Unknown/Adopted Maternal Grandmother      Unknown/Adopted Maternal Grandfather      Unknown/Adopted Paternal Grandmother      Unknown/Adopted Paternal Grandfather        SOCIAL HISTORY:  Social History     Socioeconomic History     Marital status:      Spouse name: None     Number of children: None     Years of education: None     Highest education level: None   Occupational History     None   Social Needs     Financial resource strain: None     Food  insecurity:     Worry: None     Inability: None     Transportation needs:     Medical: None     Non-medical: None   Tobacco Use     Smoking status: Never Smoker     Smokeless tobacco: Never Used   Substance and Sexual Activity     Alcohol use: Yes     Comment: occ. wine     Drug use: None     Sexual activity: None   Lifestyle     Physical activity:     Days per week: None     Minutes per session: None     Stress: None   Relationships     Social connections:     Talks on phone: None     Gets together: None     Attends [employer] service: None     Active member of club or organization: None     Attends meetings of clubs or organizations: None     Relationship status: None     Intimate partner violence:     Fear of current or ex partner: None     Emotionally abused: None     Physically abused: None     Forced sexual activity: None   Other Topics Concern     Parent/sibling w/ CABG, MI or angioplasty before 65F 55M? No      Service Not Asked     Blood Transfusions Not Asked     Caffeine Concern No     Comment: coffee: 3-4 cups a day     Occupational Exposure Not Asked     Hobby Hazards Not Asked     Sleep Concern No     Stress Concern No     Weight Concern No     Special Diet Yes     Comment: diabetic diet, healthy     Back Care Not Asked     Exercise Yes     Comment: walking x3 a week     Bike Helmet Not Asked     Seat Belt Yes     Self-Exams Not Asked   Social History Narrative     None       Review of Systems:  Skin:  Negative       Eyes:  Positive for glasses    ENT:  Negative      Respiratory:  Negative       Cardiovascular:  Negative      Gastroenterology: Positive for heartburn;reflux    Genitourinary:  Negative      Musculoskeletal:  Positive for joint pain;arthritis;joint swelling    Neurologic:  Positive for headaches;numbness or tingling of feet;numbness or tingling of hands;stroke    Psychiatric:  Negative      Heme/Lymph/Imm:  Positive for allergies;easy bruising    Endocrine:  Positive for thyroid  "disorder;diabetes      Physical Exam:  Vitals: /58   Pulse 82   Ht 1.562 m (5' 1.5\")   Wt 56.2 kg (123 lb 14.4 oz)   BMI 23.03 kg/m      Constitutional:  cooperative, alert and oriented, well developed, well nourished, in no acute distress appears younger than stated age      Skin:  warm and dry to the touch, no apparent skin lesions or masses noted          Head:  normocephalic, no masses or lesions        Eyes:  pupils equal and round, conjunctivae and lids unremarkable, sclera white, no xanthalasma, EOMS intact, no nystagmus        Lymph:No Cervical lymphadenopathy present     ENT:  no pallor or cyanosis, dentition good        Neck:  carotid pulses are full and equal bilaterally, JVP normal, no carotid bruit        Respiratory:  normal breath sounds, clear to auscultation, normal A-P diameter, normal symmetry, normal respiratory excursion, no use of accessory muscles         Cardiac: regular rhythm, normal S1/S2, no S3 or S4, apical impulse not displaced, no murmurs, gallops or rubs       grade 1;systolic murmur     minimal syst murmru at apex ( echo mild mr, mild+ tr)  pulses full and equal, no bruits auscultated                                        GI:  abdomen soft, non-tender, BS normoactive, no mass, no HSM, no bruits        Extremities and Muscular Skeletal:  no deformities, clubbing, cyanosis, erythema observed;no edema   bilateral LE edema;trace;R greater than L          Neurological:  no gross motor deficits        Psych:  Alert and Oriented x 3      Recent Lab Results:  LIPID RESULTS:  Lab Results   Component Value Date    CHOL 176 06/19/2019    HDL 72 06/19/2019    LDL 88 06/19/2019    TRIG 78 06/19/2019    CHOLHDLRATIO 3.1 01/12/2011       LIVER ENZYME RESULTS:  Lab Results   Component Value Date    AST 23 04/22/2016    ALT 9 06/19/2019       CBC RESULTS:  Lab Results   Component Value Date    WBC 7.0 03/31/2015    RBC 3.97 03/31/2015    HGB 12.1 03/31/2015    HCT 36.7 03/31/2015    MCV 92 " 03/31/2015    MCH 30.5 03/31/2015    MCHC 33 03/31/2015    RDW 13.2 03/31/2015     03/31/2015       BMP RESULTS:  Lab Results   Component Value Date     (L) 06/19/2019    POTASSIUM 4.0 06/19/2019    CHLORIDE 100 06/19/2019    CO2 26 06/19/2019    ANIONGAP 13 06/19/2019     (H) 06/19/2019    BUN 21 06/19/2019    CR 1.12 06/19/2019    GFRESTIMATED 46 (L) 06/19/2019    GFRESTBLACK 56 (L) 06/19/2019    CHUN 9.7 06/19/2019        A1C RESULTS:  Lab Results   Component Value Date    A1C 6.9 (A) 04/22/2016       INR RESULTS:  No results found for: INR        CC  No referring provider defined for this encounter.

## 2019-07-30 NOTE — LETTER
7/30/2019    NOLBERTO DURON  [address]  [facility] [address]    RE: Ralph Whitten       Dear Colleague,    I had the pleasure of seeing Raplh Whitten in the [facility].    History of Present Illness:      Ralph Whitten is a 88 year old female followed here by Dr. De Jesus. She is one of her most delightful patients and looks much younger than her stated age. She returns today for follow-up  on an admission to [facility] for double vision, dizzness and was found to a TIA, no stroke by head MRI and low blood sugars with orthostatic hyptoension. She has been placed on Amayrl recently which was stopped. Losartan was discontinued and Diltiazem was reduced to 180gm per day. She has not had breakthrough chest pain on these changes. No atrial fibrillation was identified.    She had a CVA in February of 2018 which started with dizziness.  When she got to the emergency room she had right leg weakness and was given lytic therapy.  It was felt that this was due to small vessel ischemic disease.  But now we have seen paroxysmal  atrial fibrillation on a follow up event recorder so it is certainly possible it could have been from an undetected arrhythmia. Eliquis had been started for stroke prophylaxis then and she continues on this.     She has a history of paroxysmal atrial tachycardia. Her [id number] score is 6 if you do not include vascular disease.  However she has an LAD lesion of approximately for 40% by CT angiogram so her [id number] could be 7 which gives her an 9% plus risk of stroke without higher level anticoagulation. We stopped Plavix that had been started post-CVA and began Eliquis 2.5mg twice daily based on age and weight. She remains on Aspirin and Celexa (some anti-platelet effect) but she stopped her Omega-3 fatty acids. She notes some bruising so I reduced ASA to every other day.     She  has Prinzmetal's angina but her  chest discomfort has been quite stable. She has short acting Diltiazem available which she has not needed. I have refilled this today.      She had an invasive angiogram in 2000 showing no significant coronary artery disease but confirmed diagnosis of Prinzmetal angina with hands placed into cold water. Stress testing in 2009 was unremarkable for ischemia.      Based on her change in symptoms of left shoulder pain in 2017,  she underwent a CT coronary in May of 2017 showing a 25-40% stenosis in the LAD, 25% proximal LAD with poor visualization of the distal LAD. The circumflex and right coronary had no stenosis. Total calcium score was 31. There was an incidental finding of a 5 mm right lower lung lower lobe lung nodule; repeat scan was done in May 2018 and showed a few new small nodules. This report was sent to [facility] for further follow up.      She has significant social stressors in her life. 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 This is limited their activity, socialization, and caused stress and anxiety for her.  She recently sold her house and moved to a senior building that has the ability to transition to assisted living or memory care. Her  recently had a large MI and stents. He suffered a post procedure stroke with left sided weakness and GI bleeding; he is in TCU recovering. Her 58 year old daughter has been diagnosed with ovarian cancer and is undergoing chemotherapy and will undergo extensive abdominal surgery for this at [facility] on 8-. Prior to her recent admission, Ralph had not been sleeping or eating well due to all of these ongoing issues. Her weight is stable.     Last echocardiogram in 2017 noted 2+ TR and she has had variable levels of MR up to 1-2+ in the past. EF is preserved. She has a visit here  with Dr. De Jesus in September and I will order a repeat echo prior to that visit.     BMP: from [facility] admit: sodium 140 potassium 4.6 BUN 16 creatinine 0.94    Lipid panel 6-2019: total cholesterol 176 HDL 72 LDL 88 TG normal  Glucose is mildly elevated and is a know diabetic    BP today off losartan is controlled. Sitting BP is 118/62, standing 120/60         Impression/Plan:       1. Prinzmetal angina currently under good control.   - her shoulder pain has responded to an injection of steroids and physical therapy via the orthopedic clinic. We will continue her medical management.  Continue ASA 81mg but every other day  -continue Diltiazem at lower dose with 60mg short-acting for any breakthrough    2. Hypertension by history with orthostasis  -wide pulse pressure  -remain off of losartan    3. Situational depression on Celexa--this is likely in part due to the stress being a caregiver for her ; this medication adds an additional blood thinner effect but she has been stable and had great benefit from this. Omega 3 was stopped when Eliquis was started.    4.Parosxymal atrial fibrillation-asymptomatic with elevated [id number] score.    -Continue Eliquis 2.5mg twice daily; if this becomes unaffordable, she would be willing to change to Warfarin and have this managed at [facility] as her  takes warfarin and goes to the INR clinic there.  -no atrial fibrillation noted with her recent admission    5. Dyslipidemia treated with LDL 88--continue pravastatin 40 mg day.    6. Mild valvular heart disease; moderate TR; variable MR--check echo prior to her visit in September    7.recent admission for TIA/possible hypoglycemia/orthostasis  -see discussion above  -stay off losartan  -no p. afib identified     As, always it has been pleasure.   Greater than 50% of this 30 minute visit was spent in counseling           Gwyn Camara, MSN, APRN-BC, CNP  Cardiology    Orders Placed This Encounter   Procedures      Echocardiogram Complete     Orders Placed This Encounter   Medications     DISCONTD: diltiazem ER (DILT-XR) 180 MG 24 hr capsule     Sig: Take 1 capsule (180 mg) by mouth daily     Dispense:  90 capsule     Refill:  3     diltiazem (CARDIZEM) 60 MG tablet     Sig: Take 1 tablet (60 mg) by mouth as needed (breakthrough chest pain) Take 1 tablet only as needed for increased palpitatons     Dispense:  10 tablet     Refill:  3     diltiazem ER (DILT-XR) 180 MG 24 hr capsule     Sig: Take 1 capsule (180 mg) by mouth daily     Dispense:  90 capsule     Refill:  3     Medications Discontinued During This Encounter   Medication Reason     losartan (COZAAR) 50 MG tablet      diltiazem 240 MG 24 hr ER capsule      diltiazem (CARDIZEM) 60 MG tablet Reorder     diltiazem ER (DILT-XR) 180 MG 24 hr capsule Reorder         Encounter Diagnosis   Name Primary?     Paroxysmal supraventricular tachycardia (H)        CURRENT MEDICATIONS:  Current Outpatient Medications   Medication Sig Dispense Refill     apixaban ANTICOAGULANT (ELIQUIS) 2.5 MG tablet Take 1 tablet (2.5 mg) by mouth 2 times daily 60 tablet 11     aspirin 81 MG tablet Take 81 mg by mouth every other day       Cholecalciferol (VITAMIN D3 PO) Take by mouth daily       citalopram (CELEXA) 10 MG tablet Take 10 mg by mouth daily       diltiazem (CARDIZEM) 60 MG tablet Take 1 tablet (60 mg) by mouth as needed (breakthrough chest pain) Take 1 tablet only as needed for increased palpitatons 10 tablet 3     diltiazem ER (DILT-XR) 180 MG 24 hr capsule Take 1 capsule (180 mg) by mouth daily 90 capsule 3     levothyroxine (SYNTHROID) 75 MCG tablet Take 50 mcg by mouth daily        LORAZEPAM PO Take 0.5 mg by mouth daily as needed        metFORMIN (GLUCOPHAGE) 500 MG tablet Take 500 mg by mouth daily (with breakfast)        Multiple Vitamin (DAILY MULTIVITAMIN PO) Take by mouth daily        nitroGLYcerin (NITROSTAT) 0.4 MG sublingual tablet Place 1 tablet (0.4 mg) under the tongue  every 5 minutes as needed for chest pain 25 tablet 3     OMEPRAZOLE PO Take 20 mg by mouth       pravastatin (PRAVACHOL) 40 MG tablet Take 1 tablet (40 mg) by mouth At Bedtime 90 tablet 3     traZODone (DESYREL) 50 MG tablet Take 50 mg by mouth At Bedtime         ALLERGIES     Allergies   Allergen Reactions     Diovan [Valsartan] Other (See Comments) and GI Disturbance     Arthralgia     Hmg-Coa-R Inhibitors Other (See Comments)     Statin Medications give patient Myalgias---simvastatin     Sulfa Drugs Rash       PAST MEDICAL HISTORY:  Past Medical History:   Diagnosis Date     CVA (cerebral vascular accident) (H) 02/2018    ANW-details unk, treated with t-pa (dizziness presenting sx)     Degenerative disk disease     C 6/7     Diabetes mellitus (H)      Diabetic neuropathy (H)      Essential hypertension, benign      Generalized osteoarthrosis, unspecified site (aka ARTHRITIS)     osteoporosis and osteoarthritis     Hyperlipidemia      Hypothyroid      Mitral valve disorder     mild/mod     PAT (paroxysmal atrial tachycardia) (H)      Prinzmetal angina (H)     no cad 2000     PUD (peptic ulcer disease)      Rheumatic fever      Tricuspid regurgitation     mod     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       PAST SURGICAL HISTORY:  Past Surgical History:   Procedure Laterality Date     APPENDECTOMY       C RAD RESEC TONSIL/PILLARS       CORONARY ANGIOGRAPHY ADULT ORDER  2002    normal Coronary arteries, Vasospasms     HYSTERECTOMY      total     rotator cuff surgery      right     TONSILLECTOMY         FAMILY HISTORY:  Family History   Problem Relation Age of Onset     Coronary Artery Disease Mother      Myocardial Infarction Mother      Heart Failure Father      Unknown/Adopted Maternal Grandmother      Unknown/Adopted Maternal Grandfather      Unknown/Adopted Paternal Grandmother      Unknown/Adopted Paternal Grandfather        SOCIAL HISTORY:  Social History     Socioeconomic  History     Marital status:      Spouse name: None     Number of children: None     Years of education: None     Highest education level: None   Occupational History     None   Social Needs     Financial resource strain: None     Food insecurity:     Worry: None     Inability: None     Transportation needs:     Medical: None     Non-medical: None   Tobacco Use     Smoking status: Never Smoker     Smokeless tobacco: Never Used   Substance and Sexual Activity     Alcohol use: Yes     Comment: occ. wine     Drug use: None     Sexual activity: None   Lifestyle     Physical activity:     Days per week: None     Minutes per session: None     Stress: None   Relationships     Social connections:     Talks on phone: None     Gets together: None     Attends Orthodoxy service: None     Active member of club or organization: None     Attends meetings of clubs or organizations: None     Relationship status: None     Intimate partner violence:     Fear of current or ex partner: None     Emotionally abused: None     Physically abused: None     Forced sexual activity: None   Other Topics Concern     Parent/sibling w/ CABG, MI or angioplasty before 65F 55M? No      Service Not Asked     Blood Transfusions Not Asked     Caffeine Concern No     Comment: coffee: 3-4 cups a day     Occupational Exposure Not Asked     Hobby Hazards Not Asked     Sleep Concern No     Stress Concern No     Weight Concern No     Special Diet Yes     Comment: diabetic diet, healthy     Back Care Not Asked     Exercise Yes     Comment: walking x3 a week     Bike Helmet Not Asked     Seat Belt Yes     Self-Exams Not Asked   Social History Narrative     None       Review of Systems:  Skin:  Negative       Eyes:  Positive for glasses    ENT:  Negative      Respiratory:  Negative       Cardiovascular:  Negative      Gastroenterology: Positive for heartburn;reflux    Genitourinary:  Negative      Musculoskeletal:  Positive for joint  "pain;arthritis;joint swelling    Neurologic:  Positive for headaches;numbness or tingling of feet;numbness or tingling of hands;stroke    Psychiatric:  Negative      Heme/Lymph/Imm:  Positive for allergies;easy bruising    Endocrine:  Positive for thyroid disorder;diabetes      Physical Exam:  Vitals: /58   Pulse 82   Ht 1.562 m (5' 1.5\")   Wt 56.2 kg (123 lb 14.4 oz)   BMI 23.03 kg/m       Constitutional:  cooperative, alert and oriented, well developed, well nourished, in no acute distress appears younger than stated age      Skin:  warm and dry to the touch, no apparent skin lesions or masses noted          Head:  normocephalic, no masses or lesions        Eyes:  pupils equal and round, conjunctivae and lids unremarkable, sclera white, no xanthalasma, EOMS intact, no nystagmus        Lymph:No Cervical lymphadenopathy present     ENT:  no pallor or cyanosis, dentition good        Neck:  carotid pulses are full and equal bilaterally, JVP normal, no carotid bruit        Respiratory:  normal breath sounds, clear to auscultation, normal A-P diameter, normal symmetry, normal respiratory excursion, no use of accessory muscles         Cardiac: regular rhythm, normal S1/S2, no S3 or S4, apical impulse not displaced, no murmurs, gallops or rubs       grade 1;systolic murmur     minimal syst murmru at apex ( echo mild mr, mild+ tr)  pulses full and equal, no bruits auscultated                                        GI:  abdomen soft, non-tender, BS normoactive, no mass, no HSM, no bruits        Extremities and Muscular Skeletal:  no deformities, clubbing, cyanosis, erythema observed;no edema   bilateral LE edema;trace;R greater than L          Neurological:  no gross motor deficits        Psych:  Alert and Oriented x 3      Recent Lab Results:  LIPID RESULTS:  Lab Results   Component Value Date    CHOL 176 06/19/2019    HDL 72 06/19/2019    LDL 88 06/19/2019    TRIG 78 06/19/2019    CHOLHDLRATIO 3.1 01/12/2011 "       LIVER ENZYME RESULTS:  Lab Results   Component Value Date    AST 23 04/22/2016    ALT 9 06/19/2019       CBC RESULTS:  Lab Results   Component Value Date    WBC 7.0 03/31/2015    RBC 3.97 03/31/2015    HGB 12.1 03/31/2015    HCT 36.7 03/31/2015    MCV 92 03/31/2015    MCH 30.5 03/31/2015    MCHC 33 03/31/2015    RDW 13.2 03/31/2015     03/31/2015       BMP RESULTS:  Lab Results   Component Value Date     (L) 06/19/2019    POTASSIUM 4.0 06/19/2019    CHLORIDE 100 06/19/2019    CO2 26 06/19/2019    ANIONGAP 13 06/19/2019     (H) 06/19/2019    BUN 21 06/19/2019    CR 1.12 06/19/2019    GFRESTIMATED 46 (L) 06/19/2019    GFRESTBLACK 56 (L) 06/19/2019    CHUN 9.7 06/19/2019        A1C RESULTS:  Lab Results   Component Value Date    A1C 6.9 (A) 04/22/2016       INR RESULTS:  No results found for: INR        Thank you for allowing me to participate in the care of your patient.    Sincerely,     ANDREZ Arcos CNP     [facility]

## 2019-07-30 NOTE — PATIENT INSTRUCTIONS
Stay off of Losartan    Stay on lower dose of Diltiazem    I refilled your short acting Diltiazem    See Dr. De Jesus back in September as scheduled and have echo prior to that visit

## 2019-07-30 NOTE — PROGRESS NOTES
Called pt, offered her a visit today at 12:30pm or 3:10pm with [facility] in [place]. Pt stated she could come at 12:30pm. Scheduled pt for visit. GALO to DONNAK. DONNY Larson 7:37 AM 07/30/19

## 2019-09-09 ENCOUNTER — HOSPITAL ENCOUNTER (OUTPATIENT)
Dept: CARDIOLOGY | Facility: CLINIC | Age: 84
Discharge: HOME | End: 2019-09-09
Attending: NURSE PRACTITIONER | Admitting: NURSE PRACTITIONER
Payer: MEDICARE

## 2019-09-09 DIAGNOSIS — I47.10 PAROXYSMAL SUPRAVENTRICULAR TACHYCARDIA (H): ICD-10-CM

## 2019-09-09 PROCEDURE — 93306 TTE W/DOPPLER COMPLETE: CPT

## 2019-09-09 PROCEDURE — 93306 TTE W/DOPPLER COMPLETE: CPT | Mod: 26 | Performed by: INTERNAL MEDICINE

## 2019-09-10 ENCOUNTER — OFFICE VISIT (OUTPATIENT)
Dept: CARDIOLOGY | Facility: CLINIC | Age: 84
End: 2019-09-10
Payer: MEDICARE

## 2019-09-10 VITALS
DIASTOLIC BLOOD PRESSURE: 68 MMHG | HEART RATE: 66 BPM | WEIGHT: 123 LBS | BODY MASS INDEX: 23.22 KG/M2 | SYSTOLIC BLOOD PRESSURE: 151 MMHG | HEIGHT: 61 IN

## 2019-09-10 DIAGNOSIS — I10 BENIGN ESSENTIAL HYPERTENSION: Primary | ICD-10-CM

## 2019-09-10 DIAGNOSIS — E78.5 HYPERLIPIDEMIA LDL GOAL <70: ICD-10-CM

## 2019-09-10 DIAGNOSIS — I25.10 CORONARY ARTERY DISEASE INVOLVING NATIVE HEART, ANGINA PRESENCE UNSPECIFIED, UNSPECIFIED VESSEL OR LESION TYPE: ICD-10-CM

## 2019-09-10 PROCEDURE — 99214 OFFICE O/P EST MOD 30 MIN: CPT | Performed by: INTERNAL MEDICINE

## 2019-09-10 RX ORDER — LOSARTAN POTASSIUM 25 MG/1
25 TABLET ORAL DAILY
Qty: 90 TABLET | Refills: 3 | Status: SHIPPED | OUTPATIENT
Start: 2019-09-10 | End: 2020-09-15

## 2019-09-10 ASSESSMENT — MIFFLIN-ST. JEOR: SCORE: 933.17

## 2019-09-10 NOTE — PROGRESS NOTES
Service Date: 09/10/2019      HISTORY OF PRESENT ILLNESS:  I had the pleasure of following up on our mutual patient, Ralph Whitten.  She is a delightful, very fit, pleasant 88-year-old woman.  She has documented Prinzmetal's angina by angiography.  She has mild underlying coronary disease.  She also had SVT and paroxysmal atrial fib.  She had a presumptive stroke.  She received tPA, but I was never certain if it was actually a thrombotic stroke or not.  She was seen at [facility] in July of this year with what they called a TIA, but the way the neurologist note is written, it makes me wonder if it was actually orthostatic hypotension.  She has been maintained on Eliquis.  We had placed her on aspirin every other day at 81 mg daily.  They may have upped it to 81 a day.  However, the patient asked if she could come off the aspirin.  She is already on Celexa, which is a slight blood thinner.  We have already stopped the fish oil, and she is still bruising on her arms.  Given that the last episode was not clearly a TIA and she is on Eliquis and she is an elderly woman, I told her it would probably be reasonable to stop the aspirin and just continue the Eliquis.  Had she not been bruising and there really was a thought it was a TIA, I would have continued the aspirin indefinitely.  With regard to the blood pressure at the other hospital when they identified orthostatic hypotension, they dropped her diltiazem dose down and stopped her losartan, but it turns out her home blood pressure is now in the 150s.  I checked orthostatics here today.  She was 134/50 sitting and 141/62 standing.  I am going to actually put her back on losartan, but I will do it at 25 mg daily rather than 50.  She is doing well otherwise.  I reviewed her echocardiogram.  Her LV and RV function are normal.  She has moderate TR, which has been unchanged over the last 5 years.  I will see her back in 1 year.  I will do lipids and  electrolytes.      Thank you for allowing me to see Ms. Whitten.  Today's visit was 25 minutes, greater than 50% counseling.      Nnamdi Hannon MD       cc:   Chad Berry MD    [address], [facility] [address]    [place] [address]         NNAMDI HANNON MD             D: 09/10/2019   T: 09/10/2019   MT: CHIKI      Name:     PARADISE WHITTEN   MRN:      [id number]        Account:      [id number]   :      10/16/1930           Service Date: 09/10/2019      Document: [id number]

## 2019-09-10 NOTE — LETTER
9/10/2019      NOLBERTO DURON  [address]  [facility] [address]      RE: Ralph Whitten       Dear Colleague,    I had the pleasure of seeing Ralph Whitten in the [facility].    Service Date: 09/10/2019      HISTORY OF PRESENT ILLNESS:  I had the pleasure of following up on our mutual patient, Ralph Whitten.  She is a delightful, very fit, pleasant 88-year-old woman.  She has documented Prinzmetal's angina by angiography.  She has mild underlying coronary disease.  She also had SVT and paroxysmal atrial fib.  She had a presumptive stroke.  She received tPA, but I was never certain if it was actually a thrombotic stroke or not.  She was seen at [facility] in July of this year with what they called a TIA, but the way the neurologist note is written, it makes me wonder if it was actually orthostatic hypotension.  She has been maintained on Eliquis.  We had placed her on aspirin every other day at 81 mg daily.  They may have upped it to 81 a day.  However, the patient asked if she could come off the aspirin.  She is already on Celexa, which is a slight blood thinner.  We have already stopped the fish oil, and she is still bruising on her arms.  Given that the last episode was not clearly a TIA and she is on Eliquis and she is an elderly woman, I told her it would probably be reasonable to stop the aspirin and just continue the Eliquis.  Had she not been bruising and there really was a thought it was a TIA, I would have continued the aspirin indefinitely.  With regard to the blood pressure at the other hospital when they identified orthostatic hypotension, they dropped her diltiazem dose down and stopped her losartan, but it turns out her home blood pressure is now in the 150s.  I checked orthostatics here today.  She was 134/50 sitting and 141/62 standing.  I am going to actually put her back on losartan, but I will do it at 25 mg daily  rather than 50.  She is doing well otherwise.  I reviewed her echocardiogram.  Her LV and RV function are normal.  She has moderate TR, which has been unchanged over the last 5 years.  I will see her back in 1 year.  I will do lipids and electrolytes.      Thank you for allowing me to see Ms. Whitten.  Today's visit was 25 minutes, greater than 50% counseling.      Nnamdi Hannon MD       cc:   Chad Berry MD    [address] [facility], [address]    [place] [address]         NNAMDI HANNON MD             D: 09/10/2019   T: 09/10/2019   MT: CHIKI      Name:     PARADISE WHITTEN   MRN:      [id number]        Account:      [id number]   :      10/16/1930           Service Date: 09/10/2019      Document: [id number]         Outpatient Encounter Medications as of 9/10/2019   Medication Sig Dispense Refill     apixaban ANTICOAGULANT (ELIQUIS) 2.5 MG tablet Take 1 tablet (2.5 mg) by mouth 2 times daily 60 tablet 11     Cholecalciferol (VITAMIN D3 PO) Take by mouth daily       citalopram (CELEXA) 10 MG tablet Take 10 mg by mouth daily       diltiazem (CARDIZEM) 60 MG tablet Take 1 tablet (60 mg) by mouth as needed (breakthrough chest pain) Take 1 tablet only as needed for increased palpitatons 10 tablet 3     levothyroxine (SYNTHROID) 75 MCG tablet Take 50 mcg by mouth daily        LORAZEPAM PO Take 0.5 mg by mouth daily as needed        losartan (COZAAR) 25 MG tablet Take 1 tablet (25 mg) by mouth daily 90 tablet 3     metFORMIN (GLUCOPHAGE) 500 MG tablet Take 500 mg by mouth daily (with breakfast)        Multiple Vitamin (DAILY MULTIVITAMIN PO) Take by mouth daily        OMEPRAZOLE PO Take 20 mg by mouth       pravastatin (PRAVACHOL) 40 MG tablet Take 1 tablet (40 mg) by mouth At Bedtime 90 tablet 3     diltiazem ER (DILT-XR) 180 MG 24 hr capsule Take 1 capsule (180 mg) by mouth daily (Patient not taking: Reported on 9/10/2019) 90 capsule 3     nitroGLYcerin (NITROSTAT) 0.4 MG sublingual tablet  Place 1 tablet (0.4 mg) under the tongue every 5 minutes as needed for chest pain 25 tablet 3     traZODone (DESYREL) 50 MG tablet Take 50 mg by mouth At Bedtime       [DISCONTINUED] aspirin 81 MG tablet Take 81 mg by mouth every other day       No facility-administered encounter medications on file as of 9/10/2019.        Again, thank you for allowing me to participate in the care of your patient.      Sincerely,    Nnamdi De Jesus MD     [facility]

## 2019-09-10 NOTE — LETTER
9/10/2019    NOLBERTO DURON  [address]  [facility] [address]    RE: Ralph Whitten       Dear Colleague,    I had the pleasure of seeing Ralph Whitten in the [facility].    HPI and Plan:   See dictation    Orders Placed This Encounter   Procedures     Basic metabolic panel     Lipid Profile     Follow-Up with Cardiologist     Orders Placed This Encounter   Medications     losartan (COZAAR) 25 MG tablet     Sig: Take 1 tablet (25 mg) by mouth daily     Dispense:  90 tablet     Refill:  3     Medications Discontinued During This Encounter   Medication Reason     aspirin 81 MG tablet          Encounter Diagnoses   Name Primary?     Benign essential hypertension Yes     Coronary artery disease involving native heart, angina presence unspecified, unspecified vessel or lesion type      Hyperlipidemia LDL goal <70        CURRENT MEDICATIONS:  Current Outpatient Medications   Medication Sig Dispense Refill     apixaban ANTICOAGULANT (ELIQUIS) 2.5 MG tablet Take 1 tablet (2.5 mg) by mouth 2 times daily 60 tablet 11     Cholecalciferol (VITAMIN D3 PO) Take by mouth daily       citalopram (CELEXA) 10 MG tablet Take 10 mg by mouth daily       diltiazem (CARDIZEM) 60 MG tablet Take 1 tablet (60 mg) by mouth as needed (breakthrough chest pain) Take 1 tablet only as needed for increased palpitatons 10 tablet 3     levothyroxine (SYNTHROID) 75 MCG tablet Take 50 mcg by mouth daily        LORAZEPAM PO Take 0.5 mg by mouth daily as needed        losartan (COZAAR) 25 MG tablet Take 1 tablet (25 mg) by mouth daily 90 tablet 3     metFORMIN (GLUCOPHAGE) 500 MG tablet Take 500 mg by mouth daily (with breakfast)        Multiple Vitamin (DAILY MULTIVITAMIN PO) Take by mouth daily        OMEPRAZOLE PO Take 20 mg by mouth       pravastatin (PRAVACHOL) 40 MG tablet Take 1 tablet (40 mg) by mouth At Bedtime 90 tablet 3     diltiazem ER (DILT-XR) 180 MG 24 hr capsule Take 1  capsule (180 mg) by mouth daily (Patient not taking: Reported on 9/10/2019) 90 capsule 3     nitroGLYcerin (NITROSTAT) 0.4 MG sublingual tablet Place 1 tablet (0.4 mg) under the tongue every 5 minutes as needed for chest pain 25 tablet 3     traZODone (DESYREL) 50 MG tablet Take 50 mg by mouth At Bedtime         ALLERGIES     Allergies   Allergen Reactions     Diovan [Valsartan] Other (See Comments) and GI Disturbance     Arthralgia     Hmg-Coa-R Inhibitors Other (See Comments)     Statin Medications give patient Myalgias---simvastatin     Sulfa Drugs Rash       PAST MEDICAL HISTORY:  Past Medical History:   Diagnosis Date     CVA (cerebral vascular accident) (H) 02/2018    ANW-details unk, treated with t-pa (dizziness presenting sx)     Degenerative disk disease     C 6/7     Diabetes mellitus (H)      Diabetic neuropathy (H)      Essential hypertension, benign      Generalized osteoarthrosis, unspecified site (aka ARTHRITIS)     osteoporosis and osteoarthritis     Hyperlipidemia      Hypothyroid      Mitral valve disorder     mild/mod     Orthostasis      PAT (paroxysmal atrial tachycardia) (H)      Prinzmetal angina (H)     no cad 2000     PUD (peptic ulcer disease)      Rheumatic fever      TIA (transient ischemic attack)     2019 at [facility]-may have been orthostasis head MRI negative for stroke     Tricuspid regurgitation     mod     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       PAST SURGICAL HISTORY:  Past Surgical History:   Procedure Laterality Date     APPENDECTOMY       C RAD RESEC TONSIL/PILLARS       CORONARY ANGIOGRAPHY ADULT ORDER  2002    normal Coronary arteries, Vasospasms     HYSTERECTOMY      total     rotator cuff surgery      right     TONSILLECTOMY         FAMILY HISTORY:  Family History   Problem Relation Age of Onset     Coronary Artery Disease Mother      Myocardial Infarction Mother      Heart Failure Father      Unknown/Adopted Maternal Grandmother       Unknown/Adopted Maternal Grandfather      Unknown/Adopted Paternal Grandmother      Unknown/Adopted Paternal Grandfather        SOCIAL HISTORY:  Social History     Socioeconomic History     Marital status:      Spouse name: None     Number of children: None     Years of education: None     Highest education level: None   Occupational History     None   Social Needs     Financial resource strain: None     Food insecurity:     Worry: None     Inability: None     Transportation needs:     Medical: None     Non-medical: None   Tobacco Use     Smoking status: Never Smoker     Smokeless tobacco: Never Used   Substance and Sexual Activity     Alcohol use: Yes     Comment: occ. wine     Drug use: None     Sexual activity: None   Lifestyle     Physical activity:     Days per week: None     Minutes per session: None     Stress: None   Relationships     Social connections:     Talks on phone: None     Gets together: None     Attends Baptism service: None     Active member of club or organization: None     Attends meetings of clubs or organizations: None     Relationship status: None     Intimate partner violence:     Fear of current or ex partner: None     Emotionally abused: None     Physically abused: None     Forced sexual activity: None   Other Topics Concern     Parent/sibling w/ CABG, MI or angioplasty before 65F 55M? No      Service Not Asked     Blood Transfusions Not Asked     Caffeine Concern No     Comment: coffee: 3-4 cups a day     Occupational Exposure Not Asked     Hobby Hazards Not Asked     Sleep Concern No     Stress Concern No     Weight Concern No     Special Diet Yes     Comment: diabetic diet, healthy     Back Care Not Asked     Exercise Yes     Comment: walking x3 a week     Bike Helmet Not Asked     Seat Belt Yes     Self-Exams Not Asked   Social History Narrative     None       Review of Systems:  Skin:  Negative     Eyes:  Positive for glasses  ENT:  Negative    Respiratory:  Negative  "   Cardiovascular:    lightheadedness;Positive for  Gastroenterology: Positive for heartburn;reflux  Genitourinary:  Negative    Musculoskeletal:  Positive for joint pain;arthritis;joint swelling  Neurologic:  Positive for headaches;numbness or tingling of feet;numbness or tingling of hands;stroke  Psychiatric:  Negative    Heme/Lymph/Imm:  Positive for allergies;easy bruising  Endocrine:  Positive for thyroid disorder;diabetes    Physical Exam:  Vitals: BP (!) 151/68   Pulse 66   Ht 1.562 m (5' 1.5\")   Wt 55.8 kg (123 lb)   BMI 22.87 kg/m       Constitutional:           Skin:             Head:           Eyes:           Lymph:      ENT:           Neck:           Respiratory:            Cardiac:                                                           GI:           Extremities and Muscular Skeletal:                 Neurological:           Psych:         Recent Lab Results:  LIPID RESULTS:  Lab Results   Component Value Date    CHOL 176 06/19/2019    HDL 72 06/19/2019    LDL 88 06/19/2019    TRIG 78 06/19/2019    CHOLHDLRATIO 3.1 01/12/2011       LIVER ENZYME RESULTS:  Lab Results   Component Value Date    AST 23 04/22/2016    ALT 9 06/19/2019       CBC RESULTS:  Lab Results   Component Value Date    WBC 7.0 03/31/2015    RBC 3.97 03/31/2015    HGB 12.1 03/31/2015    HCT 36.7 03/31/2015    MCV 92 03/31/2015    MCH 30.5 03/31/2015    MCHC 33 03/31/2015    RDW 13.2 03/31/2015     03/31/2015       BMP RESULTS:  Lab Results   Component Value Date     (L) 06/19/2019    POTASSIUM 4.0 06/19/2019    CHLORIDE 100 06/19/2019    CO2 26 06/19/2019    ANIONGAP 13 06/19/2019     (H) 06/19/2019    BUN 21 06/19/2019    CR 1.12 06/19/2019    GFRESTIMATED 46 (L) 06/19/2019    GFRESTBLACK 56 (L) 06/19/2019    CHUN 9.7 06/19/2019        A1C RESULTS:  Lab Results   Component Value Date    A1C 6.9 (A) 04/22/2016       INR RESULTS:  No results found for: INR        CC  No referring provider defined for this " encounter.    Thank you for allowing me to participate in the care of your patient.      Sincerely,     Nnamdi De Jesus MD     [facility]    cc:   No referring provider defined for this encounter.

## 2019-09-10 NOTE — PROGRESS NOTES
HPI and Plan:   See dictation    Orders Placed This Encounter   Procedures     Basic metabolic panel     Lipid Profile     Follow-Up with Cardiologist     Orders Placed This Encounter   Medications     losartan (COZAAR) 25 MG tablet     Sig: Take 1 tablet (25 mg) by mouth daily     Dispense:  90 tablet     Refill:  3     Medications Discontinued During This Encounter   Medication Reason     aspirin 81 MG tablet          Encounter Diagnoses   Name Primary?     Benign essential hypertension Yes     Coronary artery disease involving native heart, angina presence unspecified, unspecified vessel or lesion type      Hyperlipidemia LDL goal <70        CURRENT MEDICATIONS:  Current Outpatient Medications   Medication Sig Dispense Refill     apixaban ANTICOAGULANT (ELIQUIS) 2.5 MG tablet Take 1 tablet (2.5 mg) by mouth 2 times daily 60 tablet 11     Cholecalciferol (VITAMIN D3 PO) Take by mouth daily       citalopram (CELEXA) 10 MG tablet Take 10 mg by mouth daily       diltiazem (CARDIZEM) 60 MG tablet Take 1 tablet (60 mg) by mouth as needed (breakthrough chest pain) Take 1 tablet only as needed for increased palpitatons 10 tablet 3     levothyroxine (SYNTHROID) 75 MCG tablet Take 50 mcg by mouth daily        LORAZEPAM PO Take 0.5 mg by mouth daily as needed        losartan (COZAAR) 25 MG tablet Take 1 tablet (25 mg) by mouth daily 90 tablet 3     metFORMIN (GLUCOPHAGE) 500 MG tablet Take 500 mg by mouth daily (with breakfast)        Multiple Vitamin (DAILY MULTIVITAMIN PO) Take by mouth daily        OMEPRAZOLE PO Take 20 mg by mouth       pravastatin (PRAVACHOL) 40 MG tablet Take 1 tablet (40 mg) by mouth At Bedtime 90 tablet 3     diltiazem ER (DILT-XR) 180 MG 24 hr capsule Take 1 capsule (180 mg) by mouth daily (Patient not taking: Reported on 9/10/2019) 90 capsule 3     nitroGLYcerin (NITROSTAT) 0.4 MG sublingual tablet Place 1 tablet (0.4 mg) under the tongue every 5 minutes as needed for chest pain 25 tablet 3      traZODone (DESYREL) 50 MG tablet Take 50 mg by mouth At Bedtime         ALLERGIES     Allergies   Allergen Reactions     Diovan [Valsartan] Other (See Comments) and GI Disturbance     Arthralgia     Hmg-Coa-R Inhibitors Other (See Comments)     Statin Medications give patient Myalgias---simvastatin     Sulfa Drugs Rash       PAST MEDICAL HISTORY:  Past Medical History:   Diagnosis Date     CVA (cerebral vascular accident) (H) 02/2018    ANW-details unk, treated with t-pa (dizziness presenting sx)     Degenerative disk disease     C 6/7     Diabetes mellitus (H)      Diabetic neuropathy (H)      Essential hypertension, benign      Generalized osteoarthrosis, unspecified site (aka ARTHRITIS)     osteoporosis and osteoarthritis     Hyperlipidemia      Hypothyroid      Mitral valve disorder     mild/mod     Orthostasis      PAT (paroxysmal atrial tachycardia) (H)      Prinzmetal angina (H)     no cad 2000     PUD (peptic ulcer disease)      Rheumatic fever      TIA (transient ischemic attack)     2019 at [facility]-may have been orthostasis head MRI negative for stroke     Tricuspid regurgitation     mod     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       PAST SURGICAL HISTORY:  Past Surgical History:   Procedure Laterality Date     APPENDECTOMY       C RAD RESEC TONSIL/PILLARS       CORONARY ANGIOGRAPHY ADULT ORDER  2002    normal Coronary arteries, Vasospasms     HYSTERECTOMY      total     rotator cuff surgery      right     TONSILLECTOMY         FAMILY HISTORY:  Family History   Problem Relation Age of Onset     Coronary Artery Disease Mother      Myocardial Infarction Mother      Heart Failure Father      Unknown/Adopted Maternal Grandmother      Unknown/Adopted Maternal Grandfather      Unknown/Adopted Paternal Grandmother      Unknown/Adopted Paternal Grandfather        SOCIAL HISTORY:  Social History     Socioeconomic History     Marital status:      Spouse name: None      Number of children: None     Years of education: None     Highest education level: None   Occupational History     None   Social Needs     Financial resource strain: None     Food insecurity:     Worry: None     Inability: None     Transportation needs:     Medical: None     Non-medical: None   Tobacco Use     Smoking status: Never Smoker     Smokeless tobacco: Never Used   Substance and Sexual Activity     Alcohol use: Yes     Comment: occ. wine     Drug use: None     Sexual activity: None   Lifestyle     Physical activity:     Days per week: None     Minutes per session: None     Stress: None   Relationships     Social connections:     Talks on phone: None     Gets together: None     Attends Voodoo service: None     Active member of club or organization: None     Attends meetings of clubs or organizations: None     Relationship status: None     Intimate partner violence:     Fear of current or ex partner: None     Emotionally abused: None     Physically abused: None     Forced sexual activity: None   Other Topics Concern     Parent/sibling w/ CABG, MI or angioplasty before 65F 55M? No      Service Not Asked     Blood Transfusions Not Asked     Caffeine Concern No     Comment: coffee: 3-4 cups a day     Occupational Exposure Not Asked     Hobby Hazards Not Asked     Sleep Concern No     Stress Concern No     Weight Concern No     Special Diet Yes     Comment: diabetic diet, healthy     Back Care Not Asked     Exercise Yes     Comment: walking x3 a week     Bike Helmet Not Asked     Seat Belt Yes     Self-Exams Not Asked   Social History Narrative     None       Review of Systems:  Skin:  Negative     Eyes:  Positive for glasses  ENT:  Negative    Respiratory:  Negative    Cardiovascular:    lightheadedness;Positive for  Gastroenterology: Positive for heartburn;reflux  Genitourinary:  Negative    Musculoskeletal:  Positive for joint pain;arthritis;joint swelling  Neurologic:  Positive for  "headaches;numbness or tingling of feet;numbness or tingling of hands;stroke  Psychiatric:  Negative    Heme/Lymph/Imm:  Positive for allergies;easy bruising  Endocrine:  Positive for thyroid disorder;diabetes    Physical Exam:  Vitals: BP (!) 151/68   Pulse 66   Ht 1.562 m (5' 1.5\")   Wt 55.8 kg (123 lb)   BMI 22.87 kg/m      Constitutional:           Skin:             Head:           Eyes:           Lymph:      ENT:           Neck:           Respiratory:            Cardiac:                                                           GI:           Extremities and Muscular Skeletal:                 Neurological:           Psych:         Recent Lab Results:  LIPID RESULTS:  Lab Results   Component Value Date    CHOL 176 06/19/2019    HDL 72 06/19/2019    LDL 88 06/19/2019    TRIG 78 06/19/2019    CHOLHDLRATIO 3.1 01/12/2011       LIVER ENZYME RESULTS:  Lab Results   Component Value Date    AST 23 04/22/2016    ALT 9 06/19/2019       CBC RESULTS:  Lab Results   Component Value Date    WBC 7.0 03/31/2015    RBC 3.97 03/31/2015    HGB 12.1 03/31/2015    HCT 36.7 03/31/2015    MCV 92 03/31/2015    MCH 30.5 03/31/2015    MCHC 33 03/31/2015    RDW 13.2 03/31/2015     03/31/2015       BMP RESULTS:  Lab Results   Component Value Date     (L) 06/19/2019    POTASSIUM 4.0 06/19/2019    CHLORIDE 100 06/19/2019    CO2 26 06/19/2019    ANIONGAP 13 06/19/2019     (H) 06/19/2019    BUN 21 06/19/2019    CR 1.12 06/19/2019    GFRESTIMATED 46 (L) 06/19/2019    GFRESTBLACK 56 (L) 06/19/2019    CHUN 9.7 06/19/2019        A1C RESULTS:  Lab Results   Component Value Date    A1C 6.9 (A) 04/22/2016       INR RESULTS:  No results found for: INR        CC  No referring provider defined for this encounter.  "

## 2020-09-14 ENCOUNTER — TELEPHONE (OUTPATIENT)
Dept: CARDIOLOGY | Facility: CLINIC | Age: 85
End: 2020-09-14

## 2020-09-14 DIAGNOSIS — I10 BENIGN ESSENTIAL HYPERTENSION: ICD-10-CM

## 2020-09-14 DIAGNOSIS — E78.5 HYPERLIPIDEMIA LDL GOAL <70: ICD-10-CM

## 2020-09-14 DIAGNOSIS — I25.10 CORONARY ARTERY DISEASE INVOLVING NATIVE HEART, ANGINA PRESENCE UNSPECIFIED, UNSPECIFIED VESSEL OR LESION TYPE: ICD-10-CM

## 2020-09-14 LAB
ANION GAP SERPL CALCULATED.3IONS-SCNC: 12.5 MMOL/L (ref 6–17)
BUN SERPL-MCNC: 21 MG/DL (ref 7–30)
CALCIUM SERPL-MCNC: 10.1 MG/DL (ref 8.5–10.5)
CHLORIDE SERPL-SCNC: 102 MMOL/L (ref 98–107)
CHOLEST SERPL-MCNC: 172 MG/DL
CO2 SERPL-SCNC: 25 MMOL/L (ref 23–29)
CREAT SERPL-MCNC: 1.15 MG/DL (ref 0.7–1.3)
GFR SERPL CREATININE-BSD FRML MDRD: 44 ML/MIN/{1.73_M2}
GLUCOSE SERPL-MCNC: 134 MG/DL (ref 70–105)
HDLC SERPL-MCNC: 75 MG/DL
LDLC SERPL CALC-MCNC: 79 MG/DL
NONHDLC SERPL-MCNC: 97 MG/DL
POTASSIUM SERPL-SCNC: 4.5 MMOL/L (ref 3.5–5.1)
SODIUM SERPL-SCNC: 135 MMOL/L (ref 136–145)
TRIGL SERPL-MCNC: 90 MG/DL

## 2020-09-14 PROCEDURE — 36415 COLL VENOUS BLD VENIPUNCTURE: CPT | Performed by: INTERNAL MEDICINE

## 2020-09-14 PROCEDURE — 80061 LIPID PANEL: CPT | Performed by: INTERNAL MEDICINE

## 2020-09-14 PROCEDURE — 80048 BASIC METABOLIC PNL TOTAL CA: CPT | Performed by: INTERNAL MEDICINE

## 2020-09-14 NOTE — TELEPHONE ENCOUNTER
Wellness Screening Tool    Symptom Screening:    Do you have one of the following NEW symptoms:      Fever (subjective or >100.0)?  No    New cough? No    Shortness of breath? No    Chills? No    New loss of taste or smell? No    Generalized body aches? No    New persistent headache? No    New sore throat? No    Nausea, vomiting or diarrhea? No    Within the past 2 weeks, have you been exposed to someone with a known positive illness below?      COVID - 19 (known or suspected) No    Chicken pox?  No    Measles? No    Pertussis? No    Have you had a positive COVID-19 diagnostic test (nasal swab test) in the last 14 days or are you currently   on self-quarantine restrictions (i.e.travel restriction, exposure, etc?) No        Patient notified of visitor restriction: Yes  Patient informed to wear a mask: Yes    Patient's appointment status: Patient will be seen in clinic as scheduled on 09/15/20  Reviewed:  NONI Velasquez 09/14/20

## 2020-09-15 ENCOUNTER — OFFICE VISIT (OUTPATIENT)
Dept: CARDIOLOGY | Facility: CLINIC | Age: 85
End: 2020-09-15
Payer: MEDICARE

## 2020-09-15 VITALS
WEIGHT: 122 LBS | HEIGHT: 62 IN | BODY MASS INDEX: 22.45 KG/M2 | DIASTOLIC BLOOD PRESSURE: 74 MMHG | SYSTOLIC BLOOD PRESSURE: 122 MMHG | HEART RATE: 78 BPM

## 2020-09-15 DIAGNOSIS — I47.10 PAROXYSMAL SUPRAVENTRICULAR TACHYCARDIA (H): ICD-10-CM

## 2020-09-15 DIAGNOSIS — I20.1 CORONARY VASOSPASM (H): Primary | ICD-10-CM

## 2020-09-15 DIAGNOSIS — I10 BENIGN ESSENTIAL HYPERTENSION: ICD-10-CM

## 2020-09-15 PROCEDURE — 99214 OFFICE O/P EST MOD 30 MIN: CPT | Performed by: NURSE PRACTITIONER

## 2020-09-15 RX ORDER — DILTIAZEM HYDROCHLORIDE 180 MG/1
180 CAPSULE, EXTENDED RELEASE ORAL DAILY
Qty: 90 CAPSULE | Refills: 3 | COMMUNITY
Start: 2020-09-15

## 2020-09-15 RX ORDER — LOSARTAN POTASSIUM 25 MG/1
25 TABLET ORAL DAILY
Qty: 90 TABLET | Refills: 3 | Status: SHIPPED | OUTPATIENT
Start: 2020-09-15 | End: 2022-06-10

## 2020-09-15 RX ORDER — NITROGLYCERIN 0.4 MG/1
0.4 TABLET SUBLINGUAL EVERY 5 MIN PRN
Qty: 25 TABLET | Refills: 3 | Status: SHIPPED | OUTPATIENT
Start: 2020-09-15

## 2020-09-15 RX ORDER — DILTIAZEM HCL 60 MG
60 TABLET ORAL PRN
Qty: 10 TABLET | Refills: 3 | COMMUNITY
Start: 2020-09-15

## 2020-09-15 ASSESSMENT — MIFFLIN-ST. JEOR: SCORE: 923.7

## 2020-09-15 NOTE — LETTER
9/15/2020    NOLBERTO DURON  [address]  [facility] [place] [address]    RE: Ralph Guthrierenato       Dear Colleague,    I had the pleasure of seeing Ralph Whitten in the [facility].    History of Present Illness:    Ralph Whitten is a 89 year old female followed here by Dr. De Jesus who returns today for her annual visit. 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 She has a history of SVT and paroxysmal atrial fibrillation.    She had a stroke treated at [facility] with tPA but it is uncertain if this was actually a thrombotic stroke. She is on Eliquis with ASA 81mg every other day. She was complaining of bruising at her visit a year ago, Dr. De Jesus stopped her Aspirin.     In July of last year, she was seen in the ED at [facility] with a TIA but could have partially been orthostasis. Her Diltiazem was reduced and Losartan was was stopped. At her visit one year ago, her Losartan was restarted at 25mg daily which was half of her usual dose.    Echocardiogram last year: normal biventricular function, moderate TR unchanged for 5 years.    Labs 9-:  Total cholesterol 172 HDL 75 LDL down from 88 to 79 TG 90  BMP: sodium 135 potassium 4.5 BUN 21 Creatinine 1.15 GFR 44-all stable      She overall is doing well but has much stress as her  is in a care center and has been moved; her daughter's ovarian cancer is back and she needs surgery. COVID isolation is starting to take a toll on her mentally. She feels more anxious than she used to and remains on Celexa.    Impression/Plan:     1. Prinzmetal angina  Mild CAD  No increase chest pain  Nitroglycerine refilled      2. HTN-reasonable control  Known orthostasis so avoid overtreating her BP  Tolerating losartan well  Bmp stable    3. Paroxysmal atrial fibrillation/SVT  -continue Eliquis    4. Dyslipidemia  Overall controlled\continue pravastatin    5.  TR-moderate and stable  Last echo one year ago    6. TIA versus orthostasis    7. Previous history of stroke    It has been a pleasure seeing Ralph Whitten in follow up. We will see her in 6 months. She was very emotionally upset today over her daughter and COVID not allowing her to see her  of 70 years as he is in a care center. She is also more isolated at home    Gwyn Camara, MSN, APRN-BC, CNP  Cardiology    Orders Placed This Encounter   Procedures     Follow-Up with Cardiologist     Orders Placed This Encounter   Medications     diltiazem (CARDIZEM) 60 MG tablet     Sig: Take 1 tablet (60 mg) by mouth as needed (breakthrough chest pain) Take 1 tablet only as needed for increased palpitatons     Dispense:  10 tablet     Refill:  3     diltiazem ER (DILT-XR) 180 MG 24 hr capsule     Sig: Take 1 capsule (180 mg) by mouth daily     Dispense:  90 capsule     Refill:  3     losartan (COZAAR) 25 MG tablet     Sig: Take 1 tablet (25 mg) by mouth daily     Dispense:  90 tablet     Refill:  3     nitroGLYcerin (NITROSTAT) 0.4 MG sublingual tablet     Sig: Place 1 tablet (0.4 mg) under the tongue every 5 minutes as needed for chest pain     Dispense:  25 tablet     Refill:  3     Medications Discontinued During This Encounter   Medication Reason     diltiazem (CARDIZEM) 60 MG tablet Reorder     diltiazem ER (DILT-XR) 180 MG 24 hr capsule Reorder     losartan (COZAAR) 25 MG tablet Reorder     nitroGLYcerin (NITROSTAT) 0.4 MG sublingual tablet Reorder         Encounter Diagnoses   Name Primary?     Benign essential hypertension      Paroxysmal supraventricular tachycardia (H)      Coronary vasospasm (H) Yes       CURRENT MEDICATIONS:  Current Outpatient Medications   Medication Sig Dispense Refill     apixaban ANTICOAGULANT (ELIQUIS) 2.5 MG tablet Take 1 tablet (2.5 mg) by mouth 2 times daily 60 tablet 11     Cholecalciferol (VITAMIN D3 PO) Take by mouth daily       citalopram (CELEXA) 10 MG tablet Take 10  mg by mouth daily       diltiazem (CARDIZEM) 60 MG tablet Take 1 tablet (60 mg) by mouth as needed (breakthrough chest pain) Take 1 tablet only as needed for increased palpitatons 10 tablet 3     diltiazem ER (DILT-XR) 180 MG 24 hr capsule Take 1 capsule (180 mg) by mouth daily 90 capsule 3     levothyroxine (SYNTHROID) 75 MCG tablet Take 50 mcg by mouth daily        LORAZEPAM PO Take 0.5 mg by mouth daily as needed        losartan (COZAAR) 25 MG tablet Take 1 tablet (25 mg) by mouth daily 90 tablet 3     metFORMIN (GLUCOPHAGE) 500 MG tablet Take 500 mg by mouth daily (with breakfast)        Multiple Vitamin (DAILY MULTIVITAMIN PO) Take by mouth daily        nitroGLYcerin (NITROSTAT) 0.4 MG sublingual tablet Place 1 tablet (0.4 mg) under the tongue every 5 minutes as needed for chest pain 25 tablet 3     pravastatin (PRAVACHOL) 40 MG tablet Take 1 tablet (40 mg) by mouth At Bedtime 90 tablet 3     traZODone (DESYREL) 50 MG tablet Take 50 mg by mouth At Bedtime       OMEPRAZOLE PO Take 20 mg by mouth         ALLERGIES     Allergies   Allergen Reactions     Diovan [Valsartan] Other (See Comments) and GI Disturbance     Arthralgia     Hmg-Coa-R Inhibitors Other (See Comments)     Statin Medications give patient Myalgias---simvastatin     Sulfa Drugs Rash       PAST MEDICAL HISTORY:  Past Medical History:   Diagnosis Date     CVA (cerebral vascular accident) (H) 02/2018    ANW-details unk, treated with t-pa (dizziness presenting sx)     Degenerative disk disease     C 6/7     Diabetes mellitus (H)      Diabetic neuropathy (H)      Essential hypertension, benign      Generalized osteoarthrosis, unspecified site (aka ARTHRITIS)     osteoporosis and osteoarthritis     Hyperlipidemia      Hypothyroid      Mitral valve disorder     mild/mod     Orthostasis      PAT (paroxysmal atrial tachycardia) (H)      Prinzmetal angina (H)     no cad 2000     PUD (peptic ulcer disease)      Rheumatic fever      TIA (transient ischemic  attack)     2019 at [facility]-may have been orthostasis head MRI negative for stroke     Tricuspid regurgitation     mod     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       PAST SURGICAL HISTORY:  Past Surgical History:   Procedure Laterality Date     APPENDECTOMY       C RAD RESEC TONSIL/PILLARS       CORONARY ANGIOGRAPHY ADULT ORDER  2002    normal Coronary arteries, Vasospasms     HYSTERECTOMY      total     rotator cuff surgery      right     TONSILLECTOMY         FAMILY HISTORY:  Family History   Problem Relation Age of Onset     Coronary Artery Disease Mother      Myocardial Infarction Mother      Heart Failure Father      Unknown/Adopted Maternal Grandmother      Unknown/Adopted Maternal Grandfather      Unknown/Adopted Paternal Grandmother      Unknown/Adopted Paternal Grandfather        SOCIAL HISTORY:  Social History     Socioeconomic History     Marital status:      Spouse name: None     Number of children: None     Years of education: None     Highest education level: None   Occupational History     None   Social Needs     Financial resource strain: None     Food insecurity     Worry: None     Inability: None     Transportation needs     Medical: None     Non-medical: None   Tobacco Use     Smoking status: Never Smoker     Smokeless tobacco: Never Used   Substance and Sexual Activity     Alcohol use: Yes     Comment: occ. wine     Drug use: None     Sexual activity: None   Lifestyle     Physical activity     Days per week: None     Minutes per session: None     Stress: None   Relationships     Social connections     Talks on phone: None     Gets together: None     Attends Worship service: None     Active member of club or organization: None     Attends meetings of clubs or organizations: None     Relationship status: None     Intimate partner violence     Fear of current or ex partner: None     Emotionally abused: None     Physically abused: None     Forced sexual  "activity: None   Other Topics Concern     Parent/sibling w/ CABG, MI or angioplasty before 65F 55M? No      Service Not Asked     Blood Transfusions Not Asked     Caffeine Concern No     Comment: coffee: 3-4 cups a day     Occupational Exposure Not Asked     Hobby Hazards Not Asked     Sleep Concern No     Stress Concern No     Weight Concern No     Special Diet Yes     Comment: diabetic diet, healthy     Back Care Not Asked     Exercise Yes     Comment: walking x3 a week     Bike Helmet Not Asked     Seat Belt Yes     Self-Exams Not Asked   Social History Narrative     None       Review of Systems:  Skin:  Negative       Eyes:  Positive for glasses    ENT:  Negative      Respiratory:  Negative       Cardiovascular:    lightheadedness;Positive for;dizziness    Gastroenterology: Positive for heartburn;reflux    Genitourinary:  Negative      Musculoskeletal:  Positive for joint pain;arthritis;joint swelling    Neurologic:  Positive for headaches;numbness or tingling of feet;numbness or tingling of hands;stroke    Psychiatric:  Positive for anxiety    Heme/Lymph/Imm:  Positive for allergies;easy bruising    Endocrine:  Positive for thyroid disorder;diabetes      Physical Exam:  Vitals: /74   Pulse 78   Ht 1.562 m (5' 1.5\")   Wt 55.3 kg (122 lb)   BMI 22.68 kg/m      Constitutional:  cooperative, alert and oriented, well developed, well nourished, in no acute distress appears younger than stated age      Skin:  warm and dry to the touch, no apparent skin lesions or masses noted          Head:  normocephalic, no masses or lesions        Eyes:  pupils equal and round, conjunctivae and lids unremarkable, sclera white, no xanthalasma, EOMS intact, no nystagmus        Lymph:No Cervical lymphadenopathy present     ENT:  no pallor or cyanosis, dentition good        Neck:  carotid pulses are full and equal bilaterally, JVP normal, no carotid bruit        Respiratory:  normal breath sounds, clear to " auscultation, normal A-P diameter, normal symmetry, normal respiratory excursion, no use of accessory muscles         Cardiac: regular rhythm, normal S1/S2, no S3 or S4, apical impulse not displaced, no murmurs, gallops or rubs       grade 1;systolic murmur     minimal syst murmru at apex ( echo mild mr, mild+ tr)  pulses full and equal, no bruits auscultated                                        GI:  abdomen soft, non-tender, BS normoactive, no mass, no HSM, no bruits        Extremities and Muscular Skeletal:  no deformities, clubbing, cyanosis, erythema observed;no edema   bilateral LE edema;trace;R greater than L          Neurological:  no gross motor deficits        Psych:  Alert and Oriented x 3      Recent Lab Results:  LIPID RESULTS:  Lab Results   Component Value Date    CHOL 172 09/14/2020    HDL 75 09/14/2020    LDL 79 09/14/2020    TRIG 90 09/14/2020    CHOLHDLRATIO 3.1 01/12/2011       LIVER ENZYME RESULTS:  Lab Results   Component Value Date    AST 23 04/22/2016    ALT 9 06/19/2019       CBC RESULTS:  Lab Results   Component Value Date    WBC 7.0 03/31/2015    RBC 3.97 03/31/2015    HGB 12.1 03/31/2015    HCT 36.7 03/31/2015    MCV 92 03/31/2015    MCH 30.5 03/31/2015    MCHC 33 03/31/2015    RDW 13.2 03/31/2015     03/31/2015       BMP RESULTS:  Lab Results   Component Value Date     (L) 09/14/2020    POTASSIUM 4.5 09/14/2020    CHLORIDE 102 09/14/2020    CO2 25 09/14/2020    ANIONGAP 12.5 09/14/2020     (H) 09/14/2020    BUN 21 09/14/2020    CR 1.15 09/14/2020    GFRESTIMATED 44 (L) 09/14/2020    GFRESTBLACK 54 (L) 09/14/2020    CHUN 10.1 09/14/2020        A1C RESULTS:  Lab Results   Component Value Date    A1C 6.9 (A) 04/22/2016       INR RESULTS:  No results found for: INR        CC  No referring provider defined for this encounter.                  Thank you for allowing me to participate in the care of your patient.      Sincerely,     ANDREZ Arcos CNP      [facility]    cc:   No referring provider defined for this encounter.

## 2020-09-15 NOTE — PROGRESS NOTES
History of Present Illness:    Ralph Whitten is a 89 year old female followed here by Dr. De Jesus who returns today for her annual visit. 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 She has a history of SVT and paroxysmal atrial fibrillation.    She had a stroke treated at [facility] with tPA but it is uncertain if this was actually a thrombotic stroke. She is on Eliquis with ASA 81mg every other day. She was complaining of bruising at her visit a year ago, Dr. De Jesus stopped her Aspirin.     In July of last year, she was seen in the ED at [facility] with a TIA but could have partially been orthostasis. Her Diltiazem was reduced and Losartan was was stopped. At her visit one year ago, her Losartan was restarted at 25mg daily which was half of her usual dose.    Echocardiogram last year: normal biventricular function, moderate TR unchanged for 5 years.    Labs 9-:  Total cholesterol 172 HDL 75 LDL down from 88 to 79 TG 90  BMP: sodium 135 potassium 4.5 BUN 21 Creatinine 1.15 GFR 44-all stable      She overall is doing well but has much stress as her  is in a care center and has been moved; her daughter's ovarian cancer is back and she needs surgery. COVID isolation is starting to take a toll on her mentally. She feels more anxious than she used to and remains on Celexa.    Impression/Plan:     1. Prinzmetal angina  Mild CAD  No increase chest pain  Nitroglycerine refilled      2. HTN-reasonable control  Known orthostasis so avoid overtreating her BP  Tolerating losartan well  Bmp stable    3. Paroxysmal atrial fibrillation/SVT  -continue Eliquis    4. Dyslipidemia  Overall controlled\continue pravastatin    5. TR-moderate and stable  Last echo one year ago    6. TIA versus orthostasis    7. Previous history of stroke    It has been a pleasure seeing Ralph Whitten in follow up. We will see her in 6 months. She was very emotionally upset today over  her daughter and COVID not allowing her to see her  of 70 years as he is in a care center. She is also more isolated at home    Gwyn Camara, MSN, APRN-BC, CNP  Cardiology    Orders Placed This Encounter   Procedures     Follow-Up with Cardiologist     Orders Placed This Encounter   Medications     diltiazem (CARDIZEM) 60 MG tablet     Sig: Take 1 tablet (60 mg) by mouth as needed (breakthrough chest pain) Take 1 tablet only as needed for increased palpitatons     Dispense:  10 tablet     Refill:  3     diltiazem ER (DILT-XR) 180 MG 24 hr capsule     Sig: Take 1 capsule (180 mg) by mouth daily     Dispense:  90 capsule     Refill:  3     losartan (COZAAR) 25 MG tablet     Sig: Take 1 tablet (25 mg) by mouth daily     Dispense:  90 tablet     Refill:  3     nitroGLYcerin (NITROSTAT) 0.4 MG sublingual tablet     Sig: Place 1 tablet (0.4 mg) under the tongue every 5 minutes as needed for chest pain     Dispense:  25 tablet     Refill:  3     Medications Discontinued During This Encounter   Medication Reason     diltiazem (CARDIZEM) 60 MG tablet Reorder     diltiazem ER (DILT-XR) 180 MG 24 hr capsule Reorder     losartan (COZAAR) 25 MG tablet Reorder     nitroGLYcerin (NITROSTAT) 0.4 MG sublingual tablet Reorder         Encounter Diagnoses   Name Primary?     Benign essential hypertension      Paroxysmal supraventricular tachycardia (H)      Coronary vasospasm (H) Yes       CURRENT MEDICATIONS:  Current Outpatient Medications   Medication Sig Dispense Refill     apixaban ANTICOAGULANT (ELIQUIS) 2.5 MG tablet Take 1 tablet (2.5 mg) by mouth 2 times daily 60 tablet 11     Cholecalciferol (VITAMIN D3 PO) Take by mouth daily       citalopram (CELEXA) 10 MG tablet Take 10 mg by mouth daily       diltiazem (CARDIZEM) 60 MG tablet Take 1 tablet (60 mg) by mouth as needed (breakthrough chest pain) Take 1 tablet only as needed for increased palpitatons 10 tablet 3     diltiazem ER (DILT-XR) 180 MG 24 hr capsule  Take 1 capsule (180 mg) by mouth daily 90 capsule 3     levothyroxine (SYNTHROID) 75 MCG tablet Take 50 mcg by mouth daily        LORAZEPAM PO Take 0.5 mg by mouth daily as needed        losartan (COZAAR) 25 MG tablet Take 1 tablet (25 mg) by mouth daily 90 tablet 3     metFORMIN (GLUCOPHAGE) 500 MG tablet Take 500 mg by mouth daily (with breakfast)        Multiple Vitamin (DAILY MULTIVITAMIN PO) Take by mouth daily        nitroGLYcerin (NITROSTAT) 0.4 MG sublingual tablet Place 1 tablet (0.4 mg) under the tongue every 5 minutes as needed for chest pain 25 tablet 3     pravastatin (PRAVACHOL) 40 MG tablet Take 1 tablet (40 mg) by mouth At Bedtime 90 tablet 3     traZODone (DESYREL) 50 MG tablet Take 50 mg by mouth At Bedtime       OMEPRAZOLE PO Take 20 mg by mouth         ALLERGIES     Allergies   Allergen Reactions     Diovan [Valsartan] Other (See Comments) and GI Disturbance     Arthralgia     Hmg-Coa-R Inhibitors Other (See Comments)     Statin Medications give patient Myalgias---simvastatin     Sulfa Drugs Rash       PAST MEDICAL HISTORY:  Past Medical History:   Diagnosis Date     CVA (cerebral vascular accident) (H) 02/2018    ANW-details unk, treated with t-pa (dizziness presenting sx)     Degenerative disk disease     C 6/7     Diabetes mellitus (H)      Diabetic neuropathy (H)      Essential hypertension, benign      Generalized osteoarthrosis, unspecified site (aka ARTHRITIS)     osteoporosis and osteoarthritis     Hyperlipidemia      Hypothyroid      Mitral valve disorder     mild/mod     Orthostasis      PAT (paroxysmal atrial tachycardia) (H)      Prinzmetal angina (H)     no cad 2000     PUD (peptic ulcer disease)      Rheumatic fever      TIA (transient ischemic attack)     2019 at [facility]-may have been orthostasis head MRI negative for stroke     Tricuspid regurgitation     mod     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       PAST SURGICAL HISTORY:  Past Surgical  History:   Procedure Laterality Date     APPENDECTOMY       C RAD RESEC TONSIL/PILLARS       CORONARY ANGIOGRAPHY ADULT ORDER  2002    normal Coronary arteries, Vasospasms     HYSTERECTOMY      total     rotator cuff surgery      right     TONSILLECTOMY         FAMILY HISTORY:  Family History   Problem Relation Age of Onset     Coronary Artery Disease Mother      Myocardial Infarction Mother      Heart Failure Father      Unknown/Adopted Maternal Grandmother      Unknown/Adopted Maternal Grandfather      Unknown/Adopted Paternal Grandmother      Unknown/Adopted Paternal Grandfather        SOCIAL HISTORY:  Social History     Socioeconomic History     Marital status:      Spouse name: None     Number of children: None     Years of education: None     Highest education level: None   Occupational History     None   Social Needs     Financial resource strain: None     Food insecurity     Worry: None     Inability: None     Transportation needs     Medical: None     Non-medical: None   Tobacco Use     Smoking status: Never Smoker     Smokeless tobacco: Never Used   Substance and Sexual Activity     Alcohol use: Yes     Comment: occ. wine     Drug use: None     Sexual activity: None   Lifestyle     Physical activity     Days per week: None     Minutes per session: None     Stress: None   Relationships     Social connections     Talks on phone: None     Gets together: None     Attends Jainism service: None     Active member of club or organization: None     Attends meetings of clubs or organizations: None     Relationship status: None     Intimate partner violence     Fear of current or ex partner: None     Emotionally abused: None     Physically abused: None     Forced sexual activity: None   Other Topics Concern     Parent/sibling w/ CABG, MI or angioplasty before 65F 55M? No      Service Not Asked     Blood Transfusions Not Asked     Caffeine Concern No     Comment: coffee: 3-4 cups a day      "Occupational Exposure Not Asked     Hobby Hazards Not Asked     Sleep Concern No     Stress Concern No     Weight Concern No     Special Diet Yes     Comment: diabetic diet, healthy     Back Care Not Asked     Exercise Yes     Comment: walking x3 a week     Bike Helmet Not Asked     Seat Belt Yes     Self-Exams Not Asked   Social History Narrative     None       Review of Systems:  Skin:  Negative       Eyes:  Positive for glasses    ENT:  Negative      Respiratory:  Negative       Cardiovascular:    lightheadedness;Positive for;dizziness    Gastroenterology: Positive for heartburn;reflux    Genitourinary:  Negative      Musculoskeletal:  Positive for joint pain;arthritis;joint swelling    Neurologic:  Positive for headaches;numbness or tingling of feet;numbness or tingling of hands;stroke    Psychiatric:  Positive for anxiety    Heme/Lymph/Imm:  Positive for allergies;easy bruising    Endocrine:  Positive for thyroid disorder;diabetes      Physical Exam:  Vitals: /74   Pulse 78   Ht 1.562 m (5' 1.5\")   Wt 55.3 kg (122 lb)   BMI 22.68 kg/m      Constitutional:  cooperative, alert and oriented, well developed, well nourished, in no acute distress appears younger than stated age      Skin:  warm and dry to the touch, no apparent skin lesions or masses noted          Head:  normocephalic, no masses or lesions        Eyes:  pupils equal and round, conjunctivae and lids unremarkable, sclera white, no xanthalasma, EOMS intact, no nystagmus        Lymph:No Cervical lymphadenopathy present     ENT:  no pallor or cyanosis, dentition good        Neck:  carotid pulses are full and equal bilaterally, JVP normal, no carotid bruit        Respiratory:  normal breath sounds, clear to auscultation, normal A-P diameter, normal symmetry, normal respiratory excursion, no use of accessory muscles         Cardiac: regular rhythm, normal S1/S2, no S3 or S4, apical impulse not displaced, no murmurs, gallops or rubs       grade " 1;systolic murmur     minimal syst murmru at apex ( echo mild mr, mild+ tr)  pulses full and equal, no bruits auscultated                                        GI:  abdomen soft, non-tender, BS normoactive, no mass, no HSM, no bruits        Extremities and Muscular Skeletal:  no deformities, clubbing, cyanosis, erythema observed;no edema   bilateral LE edema;trace;R greater than L          Neurological:  no gross motor deficits        Psych:  Alert and Oriented x 3      Recent Lab Results:  LIPID RESULTS:  Lab Results   Component Value Date    CHOL 172 09/14/2020    HDL 75 09/14/2020    LDL 79 09/14/2020    TRIG 90 09/14/2020    CHOLHDLRATIO 3.1 01/12/2011       LIVER ENZYME RESULTS:  Lab Results   Component Value Date    AST 23 04/22/2016    ALT 9 06/19/2019       CBC RESULTS:  Lab Results   Component Value Date    WBC 7.0 03/31/2015    RBC 3.97 03/31/2015    HGB 12.1 03/31/2015    HCT 36.7 03/31/2015    MCV 92 03/31/2015    MCH 30.5 03/31/2015    MCHC 33 03/31/2015    RDW 13.2 03/31/2015     03/31/2015       BMP RESULTS:  Lab Results   Component Value Date     (L) 09/14/2020    POTASSIUM 4.5 09/14/2020    CHLORIDE 102 09/14/2020    CO2 25 09/14/2020    ANIONGAP 12.5 09/14/2020     (H) 09/14/2020    BUN 21 09/14/2020    CR 1.15 09/14/2020    GFRESTIMATED 44 (L) 09/14/2020    GFRESTBLACK 54 (L) 09/14/2020    CHUN 10.1 09/14/2020        A1C RESULTS:  Lab Results   Component Value Date    A1C 6.9 (A) 04/22/2016       INR RESULTS:  No results found for: INR        CC  No referring provider defined for this encounter.

## 2020-09-15 NOTE — LETTER
9/15/2020    NOLBERTO DURON  [address]  [facility] [address]    RE: Ralph Guthrierenato       Dear Colleague,    I had the pleasure of seeing Ralph Whitten in the [facility].    History of Present Illness:    Ralph Whitten is a 89 year old female followed here by Dr. De Jesus who returns today for her annual visit. 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 She has a history of SVT and paroxysmal atrial fibrillation.    She had a stroke treated at [facility] with tPA but it is uncertain if this was actually a thrombotic stroke. She is on Eliquis with ASA 81mg every other day. She was complaining of bruising at her visit a year ago, Dr. De Jesus stopped her Aspirin.     In July of last year, she was seen in the ED at [facility] with a TIA but could have partially been orthostasis. Her Diltiazem was reduced and Losartan was was stopped. At her visit one year ago, her Losartan was restarted at 25mg daily which was half of her usual dose.    Echocardiogram last year: normal biventricular function, moderate TR unchanged for 5 years.    Labs 9-:  Total cholesterol 172 HDL 75 LDL down from 88 to 79 TG 90  BMP: sodium 135 potassium 4.5 BUN 21 Creatinine 1.15 GFR 44-all stable      She overall is doing well but has much stress as her  is in a care center and has been moved; her daughter's ovarian cancer is back and she needs surgery. COVID isolation is starting to take a toll on her mentally. She feels more anxious than she used to and remains on Celexa.    Impression/Plan:     1. Prinzmetal angina  Mild CAD  No increase chest pain  Nitroglycerine refilled      2. HTN-reasonable control  Known orthostasis so avoid overtreating her BP  Tolerating losartan well  Bmp stable    3. Paroxysmal atrial fibrillation/SVT  -continue Eliquis    4. Dyslipidemia  Overall controlled\continue pravastatin    5.  TR-moderate and stable  Last echo one year ago    6. TIA versus orthostasis    7. Previous history of stroke    It has been a pleasure seeing Ralph Whitten in follow up. We will see her in 6 months. She was very emotionally upset today over her daughter and COVID not allowing her to see her  of 70 years as he is in a care center. She is also more isolated at home    Gwyn Camara, MSN, APRN-BC, CNP  Cardiology    Orders Placed This Encounter   Procedures     Follow-Up with Cardiologist     Orders Placed This Encounter   Medications     diltiazem (CARDIZEM) 60 MG tablet     Sig: Take 1 tablet (60 mg) by mouth as needed (breakthrough chest pain) Take 1 tablet only as needed for increased palpitatons     Dispense:  10 tablet     Refill:  3     diltiazem ER (DILT-XR) 180 MG 24 hr capsule     Sig: Take 1 capsule (180 mg) by mouth daily     Dispense:  90 capsule     Refill:  3     losartan (COZAAR) 25 MG tablet     Sig: Take 1 tablet (25 mg) by mouth daily     Dispense:  90 tablet     Refill:  3     nitroGLYcerin (NITROSTAT) 0.4 MG sublingual tablet     Sig: Place 1 tablet (0.4 mg) under the tongue every 5 minutes as needed for chest pain     Dispense:  25 tablet     Refill:  3     Medications Discontinued During This Encounter   Medication Reason     diltiazem (CARDIZEM) 60 MG tablet Reorder     diltiazem ER (DILT-XR) 180 MG 24 hr capsule Reorder     losartan (COZAAR) 25 MG tablet Reorder     nitroGLYcerin (NITROSTAT) 0.4 MG sublingual tablet Reorder         Encounter Diagnoses   Name Primary?     Benign essential hypertension      Paroxysmal supraventricular tachycardia (H)      Coronary vasospasm (H) Yes       CURRENT MEDICATIONS:  Current Outpatient Medications   Medication Sig Dispense Refill     apixaban ANTICOAGULANT (ELIQUIS) 2.5 MG tablet Take 1 tablet (2.5 mg) by mouth 2 times daily 60 tablet 11     Cholecalciferol (VITAMIN D3 PO) Take by mouth daily       citalopram (CELEXA) 10 MG tablet Take 10  mg by mouth daily       diltiazem (CARDIZEM) 60 MG tablet Take 1 tablet (60 mg) by mouth as needed (breakthrough chest pain) Take 1 tablet only as needed for increased palpitatons 10 tablet 3     diltiazem ER (DILT-XR) 180 MG 24 hr capsule Take 1 capsule (180 mg) by mouth daily 90 capsule 3     levothyroxine (SYNTHROID) 75 MCG tablet Take 50 mcg by mouth daily        LORAZEPAM PO Take 0.5 mg by mouth daily as needed        losartan (COZAAR) 25 MG tablet Take 1 tablet (25 mg) by mouth daily 90 tablet 3     metFORMIN (GLUCOPHAGE) 500 MG tablet Take 500 mg by mouth daily (with breakfast)        Multiple Vitamin (DAILY MULTIVITAMIN PO) Take by mouth daily        nitroGLYcerin (NITROSTAT) 0.4 MG sublingual tablet Place 1 tablet (0.4 mg) under the tongue every 5 minutes as needed for chest pain 25 tablet 3     pravastatin (PRAVACHOL) 40 MG tablet Take 1 tablet (40 mg) by mouth At Bedtime 90 tablet 3     traZODone (DESYREL) 50 MG tablet Take 50 mg by mouth At Bedtime       OMEPRAZOLE PO Take 20 mg by mouth         ALLERGIES     Allergies   Allergen Reactions     Diovan [Valsartan] Other (See Comments) and GI Disturbance     Arthralgia     Hmg-Coa-R Inhibitors Other (See Comments)     Statin Medications give patient Myalgias---simvastatin     Sulfa Drugs Rash       PAST MEDICAL HISTORY:  Past Medical History:   Diagnosis Date     CVA (cerebral vascular accident) (H) 02/2018    ANW-details unk, treated with t-pa (dizziness presenting sx)     Degenerative disk disease     C 6/7     Diabetes mellitus (H)      Diabetic neuropathy (H)      Essential hypertension, benign      Generalized osteoarthrosis, unspecified site (aka ARTHRITIS)     osteoporosis and osteoarthritis     Hyperlipidemia      Hypothyroid      Mitral valve disorder     mild/mod     Orthostasis      PAT (paroxysmal atrial tachycardia) (H)      Prinzmetal angina (H)     no cad 2000     PUD (peptic ulcer disease)      Rheumatic fever      TIA (transient ischemic  attack)     2019 at [facility]-may have been orthostasis head MRI negative for stroke     Tricuspid regurgitation     mod     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       PAST SURGICAL HISTORY:  Past Surgical History:   Procedure Laterality Date     APPENDECTOMY       C RAD RESEC TONSIL/PILLARS       CORONARY ANGIOGRAPHY ADULT ORDER  2002    normal Coronary arteries, Vasospasms     HYSTERECTOMY      total     rotator cuff surgery      right     TONSILLECTOMY         FAMILY HISTORY:  Family History   Problem Relation Age of Onset     Coronary Artery Disease Mother      Myocardial Infarction Mother      Heart Failure Father      Unknown/Adopted Maternal Grandmother      Unknown/Adopted Maternal Grandfather      Unknown/Adopted Paternal Grandmother      Unknown/Adopted Paternal Grandfather        SOCIAL HISTORY:  Social History     Socioeconomic History     Marital status:      Spouse name: None     Number of children: None     Years of education: None     Highest education level: None   Occupational History     None   Social Needs     Financial resource strain: None     Food insecurity     Worry: None     Inability: None     Transportation needs     Medical: None     Non-medical: None   Tobacco Use     Smoking status: Never Smoker     Smokeless tobacco: Never Used   Substance and Sexual Activity     Alcohol use: Yes     Comment: occ. wine     Drug use: None     Sexual activity: None   Lifestyle     Physical activity     Days per week: None     Minutes per session: None     Stress: None   Relationships     Social connections     Talks on phone: None     Gets together: None     Attends Sabianist service: None     Active member of club or organization: None     Attends meetings of clubs or organizations: None     Relationship status: None     Intimate partner violence     Fear of current or ex partner: None     Emotionally abused: None     Physically abused: None     Forced sexual  "activity: None   Other Topics Concern     Parent/sibling w/ CABG, MI or angioplasty before 65F 55M? No      Service Not Asked     Blood Transfusions Not Asked     Caffeine Concern No     Comment: coffee: 3-4 cups a day     Occupational Exposure Not Asked     Hobby Hazards Not Asked     Sleep Concern No     Stress Concern No     Weight Concern No     Special Diet Yes     Comment: diabetic diet, healthy     Back Care Not Asked     Exercise Yes     Comment: walking x3 a week     Bike Helmet Not Asked     Seat Belt Yes     Self-Exams Not Asked   Social History Narrative     None       Review of Systems:  Skin:  Negative       Eyes:  Positive for glasses    ENT:  Negative      Respiratory:  Negative       Cardiovascular:    lightheadedness;Positive for;dizziness    Gastroenterology: Positive for heartburn;reflux    Genitourinary:  Negative      Musculoskeletal:  Positive for joint pain;arthritis;joint swelling    Neurologic:  Positive for headaches;numbness or tingling of feet;numbness or tingling of hands;stroke    Psychiatric:  Positive for anxiety    Heme/Lymph/Imm:  Positive for allergies;easy bruising    Endocrine:  Positive for thyroid disorder;diabetes      Physical Exam:  Vitals: /74   Pulse 78   Ht 1.562 m (5' 1.5\")   Wt 55.3 kg (122 lb)   BMI 22.68 kg/m      Constitutional:  cooperative, alert and oriented, well developed, well nourished, in no acute distress appears younger than stated age      Skin:  warm and dry to the touch, no apparent skin lesions or masses noted          Head:  normocephalic, no masses or lesions        Eyes:  pupils equal and round, conjunctivae and lids unremarkable, sclera white, no xanthalasma, EOMS intact, no nystagmus        Lymph:No Cervical lymphadenopathy present     ENT:  no pallor or cyanosis, dentition good        Neck:  carotid pulses are full and equal bilaterally, JVP normal, no carotid bruit        Respiratory:  normal breath sounds, clear to " auscultation, normal A-P diameter, normal symmetry, normal respiratory excursion, no use of accessory muscles         Cardiac: regular rhythm, normal S1/S2, no S3 or S4, apical impulse not displaced, no murmurs, gallops or rubs       grade 1;systolic murmur     minimal syst murmru at apex ( echo mild mr, mild+ tr)  pulses full and equal, no bruits auscultated                                        GI:  abdomen soft, non-tender, BS normoactive, no mass, no HSM, no bruits        Extremities and Muscular Skeletal:  no deformities, clubbing, cyanosis, erythema observed;no edema   bilateral LE edema;trace;R greater than L          Neurological:  no gross motor deficits        Psych:  Alert and Oriented x 3      Recent Lab Results:  LIPID RESULTS:  Lab Results   Component Value Date    CHOL 172 09/14/2020    HDL 75 09/14/2020    LDL 79 09/14/2020    TRIG 90 09/14/2020    CHOLHDLRATIO 3.1 01/12/2011       LIVER ENZYME RESULTS:  Lab Results   Component Value Date    AST 23 04/22/2016    ALT 9 06/19/2019       CBC RESULTS:  Lab Results   Component Value Date    WBC 7.0 03/31/2015    RBC 3.97 03/31/2015    HGB 12.1 03/31/2015    HCT 36.7 03/31/2015    MCV 92 03/31/2015    MCH 30.5 03/31/2015    MCHC 33 03/31/2015    RDW 13.2 03/31/2015     03/31/2015       BMP RESULTS:  Lab Results   Component Value Date     (L) 09/14/2020    POTASSIUM 4.5 09/14/2020    CHLORIDE 102 09/14/2020    CO2 25 09/14/2020    ANIONGAP 12.5 09/14/2020     (H) 09/14/2020    BUN 21 09/14/2020    CR 1.15 09/14/2020    GFRESTIMATED 44 (L) 09/14/2020    GFRESTBLACK 54 (L) 09/14/2020    CHUN 10.1 09/14/2020        A1C RESULTS:  Lab Results   Component Value Date    A1C 6.9 (A) 04/22/2016       INR RESULTS:  No results found for: INR        Thank you for allowing me to participate in the care of your patient.    Sincerely,     Gwyn Camara, ANDREZ CNP     [facility]

## 2022-06-10 ENCOUNTER — OFFICE VISIT (OUTPATIENT)
Dept: CARDIOLOGY | Facility: CLINIC | Age: 87
End: 2022-06-10
Payer: MEDICARE

## 2022-06-10 VITALS — HEIGHT: 62 IN | BODY MASS INDEX: 21.9 KG/M2 | WEIGHT: 119 LBS

## 2022-06-10 DIAGNOSIS — I10 BENIGN ESSENTIAL HYPERTENSION: Primary | ICD-10-CM

## 2022-06-10 DIAGNOSIS — I47.10 PAROXYSMAL SUPRAVENTRICULAR TACHYCARDIA (H): ICD-10-CM

## 2022-06-10 DIAGNOSIS — I20.1 CORONARY VASOSPASM (H): ICD-10-CM

## 2022-06-10 PROCEDURE — 99214 OFFICE O/P EST MOD 30 MIN: CPT | Performed by: INTERNAL MEDICINE

## 2022-06-10 NOTE — LETTER
6/10/2022    NOLBERTO DURON  [address]  [facility] [address]    RE: Ralph Whitten       Dear Colleague,     I had the pleasure of seeing Ralph Whitten in the [facility].  HPI and Plan:   See dictation    No orders of the defined types were placed in this encounter.    No orders of the defined types were placed in this encounter.    Medications Discontinued During This Encounter   Medication Reason     losartan (COZAAR) 25 MG tablet          Encounter Diagnoses   Name Primary?     Benign essential hypertension Yes     Paroxysmal supraventricular tachycardia (H)      Coronary vasospasm (H)        CURRENT MEDICATIONS:  Current Outpatient Medications   Medication Sig Dispense Refill     apixaban ANTICOAGULANT (ELIQUIS) 2.5 MG tablet Take 1 tablet (2.5 mg) by mouth 2 times daily 60 tablet 11     Cholecalciferol (VITAMIN D3 PO) Take by mouth daily       citalopram (CELEXA) 10 MG tablet Take 10 mg by mouth daily       diltiazem (CARDIZEM) 60 MG tablet Take 1 tablet (60 mg) by mouth as needed (breakthrough chest pain) Take 1 tablet only as needed for increased palpitatons 10 tablet 3     diltiazem ER (DILT-XR) 180 MG 24 hr capsule Take 1 capsule (180 mg) by mouth daily 90 capsule 3     levothyroxine (SYNTHROID/LEVOTHROID) 75 MCG tablet Take 50 mcg by mouth daily        LORAZEPAM PO Take 0.5 mg by mouth daily as needed        metFORMIN (GLUCOPHAGE) 500 MG tablet Take 500 mg by mouth daily (with breakfast)        Multiple Vitamin (DAILY MULTIVITAMIN PO) Take by mouth daily        nitroGLYcerin (NITROSTAT) 0.4 MG sublingual tablet Place 1 tablet (0.4 mg) under the tongue every 5 minutes as needed for chest pain 25 tablet 3     OMEPRAZOLE PO Take 20 mg by mouth       pravastatin (PRAVACHOL) 40 MG tablet Take 1 tablet (40 mg) by mouth At Bedtime 90 tablet 3     traZODone (DESYREL) 50 MG tablet Take 50 mg by mouth At Bedtime         ALLERGIES     Allergies   Allergen Reactions     Diovan [Valsartan]  Other (See Comments) and GI Disturbance     Arthralgia     Hmg-Coa-R Inhibitors Other (See Comments)     Statin Medications give patient Myalgias---simvastatin     Sulfa Drugs Rash       PAST MEDICAL HISTORY:  Past Medical History:   Diagnosis Date     CVA (cerebral vascular accident) (H) 02/2018    ANW-details unk, treated with t-pa (dizziness presenting sx)     Degenerative disk disease     C 6/7     Diabetes mellitus (H)      Diabetic neuropathy (H)      Essential hypertension, benign      Generalized osteoarthrosis, unspecified site (aka ARTHRITIS)     osteoporosis and osteoarthritis     Hyperlipidemia      Hypothyroid      Mitral valve disorder     mild/mod     Orthostasis      PAT (paroxysmal atrial tachycardia) (H)      Prinzmetal angina (H)     no cad 2000     PUD (peptic ulcer disease)      Rheumatic fever      TIA (transient ischemic attack)     2019 at [facility]-may have been orthostasis head MRI negative for stroke     Tricuspid regurgitation     mod     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       PAST SURGICAL HISTORY:  Past Surgical History:   Procedure Laterality Date     APPENDECTOMY       CORONARY ANGIOGRAPHY ADULT ORDER  2002    normal Coronary arteries, Vasospasms     HYSTERECTOMY      total     rotator cuff surgery      right     TONSILLECTOMY       ZZC RAD RESEC TONSIL/PILLARS         FAMILY HISTORY:  Family History   Problem Relation Age of Onset     Coronary Artery Disease Mother      Myocardial Infarction Mother      Heart Failure Father      Unknown/Adopted Maternal Grandmother      Unknown/Adopted Maternal Grandfather      Unknown/Adopted Paternal Grandmother      Unknown/Adopted Paternal Grandfather        SOCIAL HISTORY:  Social History     Socioeconomic History     Marital status:      Spouse name: None     Number of children: None     Years of education: None     Highest education level: None   Tobacco Use     Smoking status: Never Smoker      "Smokeless tobacco: Never Used   Substance and Sexual Activity     Alcohol use: Yes     Comment: occ. wine   Other Topics Concern     Parent/sibling w/ CABG, MI or angioplasty before 65F 55M? No     Caffeine Concern No     Comment: coffee: 3-4 cups a day     Sleep Concern No     Stress Concern No     Weight Concern No     Special Diet Yes     Comment: diabetic diet, healthy     Exercise Yes     Comment: walking x3 a week     Seat Belt Yes       Review of Systems:  Skin:        Eyes:       ENT:       Respiratory:       Cardiovascular:       Gastroenterology:      Genitourinary:       Musculoskeletal:       Neurologic:       Psychiatric:       Heme/Lymph/Imm:       Endocrine:         Physical Exam:  Vitals: Ht 1.562 m (5' 1.5\")   Wt 54 kg (119 lb)   BMI 22.12 kg/m      Constitutional:  cooperative, alert and oriented, well developed, well nourished, in no acute distress        Skin:  warm and dry to the touch, no apparent skin lesions or masses noted          Head:  normocephalic, no masses or lesions        Eyes:  pupils equal and round, conjunctivae and lids unremarkable, sclera white, no xanthalasma, EOMS intact, no nystagmus        Lymph:No Cervical lymphadenopathy present;No thyromegaly     ENT:           Neck:  carotid pulses are full and equal bilaterally, JVP normal, no carotid bruit        Respiratory:  normal breath sounds, clear to auscultation, normal A-P diameter, normal symmetry, normal respiratory excursion, no use of accessory muscles         Cardiac: regular rhythm, normal S1/S2, no S3 or S4, apical impulse not displaced, no murmurs, gallops or rubs                              2+             2+            GI:  abdomen soft, non-tender, BS normoactive, no mass, no HSM, no bruits        Extremities and Muscular Skeletal:  no deformities, clubbing, cyanosis, erythema observed;no edema              Neurological:  no gross motor deficits        Psych:    Anxious    Recent Lab Results:  LIPID " RESULTS:  Lab Results   Component Value Date    CHOL 172 09/14/2020    HDL 75 09/14/2020    LDL 79 09/14/2020    TRIG 90 09/14/2020    CHOLHDLRATIO 3.1 01/12/2011       LIVER ENZYME RESULTS:  Lab Results   Component Value Date    AST 23 04/22/2016    ALT 9 06/19/2019       CBC RESULTS:  Lab Results   Component Value Date    WBC 7.0 03/31/2015    RBC 3.97 03/31/2015    HGB 12.1 03/31/2015    HCT 36.7 03/31/2015    MCV 92 03/31/2015    MCH 30.5 03/31/2015    MCHC 33 03/31/2015    RDW 13.2 03/31/2015     03/31/2015       BMP RESULTS:  Lab Results   Component Value Date     (L) 09/14/2020    POTASSIUM 4.5 09/14/2020    CHLORIDE 102 09/14/2020    CO2 25 09/14/2020    ANIONGAP 12.5 09/14/2020     (H) 09/14/2020    BUN 21 09/14/2020    CR 1.15 09/14/2020    GFRESTIMATED 44 (L) 09/14/2020    GFRESTBLACK 54 (L) 09/14/2020    CHUN 10.1 09/14/2020        A1C RESULTS:  Lab Results   Component Value Date    A1C 6.9 (A) 04/22/2016       INR RESULTS:  No results found for: INR        CC  No referring provider defined for this encounter.    Service Date: 06/10/2022    CARDIOLOGY OFFICE NOTE    OFFICE NOTE:  Ralph Whitten returns for followup.  She is a delightful, very pleasant young 89 year old.  She has documented coronary vasospasm, for which she takes diltiazem and p.r.n. nitroglycerin.  She has had a history of PAT in the past and a question of paroxysmal atrial fibrillation at one point.  About 2 years ago, she had a CVA at [facility] treated with TPA.  My understanding is they were not clear if it was actually a thrombotic stroke, embolic, etc.  She was placed on Eliquis along with aspirin every other day.  She was complaining about easy bruising, and so about a year and a half or 2 years ago, I stopped the aspirin and recommended continuing on Eliquis.  She was seen again in a year plus ago, and they thought she was having a TIA, but in fact, I actually think it might have been orthostasis.  She  does note lightheadedness if she stands up quickly.  Today her blood pressure sitting was 116/66, standing 108/60.  I think that she does not need to be that well treated for hypertension, so I am going to stop the losartan, but I am going to continue the diltiazem because she is using it not only for blood pressure, but also for PAT and for coronary spasm.  I asked her to check her blood pressure at home and call us if she is much outside the range of 110-135 systolic.  She is actually more than a year past due for her office visit.  I do not have any new labs here, but through her primary care office she has diabetes, and apparently it needs to be a little fine tuned. Her hemoglobin A1c is over 8%.  Thyroid level was checked, and it was normal.  Liver panel was normal.  Basic metabolic panel was normal, including a normal creatinine of 1.07, but a slightly low GFR at 49. Her weight is 54 kg.  Her age is over 80, but her creatinine is less than 1.5, so I am going to decrease the Eliquis from 5-2.5 b.i.d.  I stopped the losartan, and I will ask her to come back and see us in 1 year.  I do not plan doing a stress test or echoes for a number of years, and it looks like her primary care doctor's office has been doing an excellent job of following her for her general medicine.  I do note separately her , unfortunately, is still in the care center and not doing well, and particularly unfortunately, her daughter apparently has metastatic ovarian cancer and not doing well, so Ralph is certainly under increased stress.    Today's visit was 32 minutes.    cc:  Chad Berry MD   [facility]  [address], [address] [id number]    Nnamdi De Jesus MD        D: 06/10/2022   T: 06/10/2022   MT: [email or web address]    Name:     RALPH TIWARI  MRN:      [id number]        Account:      [id number]   :      10/16/1930           Service Date: 06/10/2022       Document: [id number]      Thank you for allowing  me to participate in the care of your patient.      Sincerely,     Nnamdi De Jesus MD     [facility]  cc:   No referring provider defined for this encounter.

## 2022-06-10 NOTE — PROGRESS NOTES
HPI and Plan:   See dictation    No orders of the defined types were placed in this encounter.    No orders of the defined types were placed in this encounter.    Medications Discontinued During This Encounter   Medication Reason     losartan (COZAAR) 25 MG tablet          Encounter Diagnoses   Name Primary?     Benign essential hypertension Yes     Paroxysmal supraventricular tachycardia (H)      Coronary vasospasm (H)        CURRENT MEDICATIONS:  Current Outpatient Medications   Medication Sig Dispense Refill     apixaban ANTICOAGULANT (ELIQUIS) 2.5 MG tablet Take 1 tablet (2.5 mg) by mouth 2 times daily 60 tablet 11     Cholecalciferol (VITAMIN D3 PO) Take by mouth daily       citalopram (CELEXA) 10 MG tablet Take 10 mg by mouth daily       diltiazem (CARDIZEM) 60 MG tablet Take 1 tablet (60 mg) by mouth as needed (breakthrough chest pain) Take 1 tablet only as needed for increased palpitatons 10 tablet 3     diltiazem ER (DILT-XR) 180 MG 24 hr capsule Take 1 capsule (180 mg) by mouth daily 90 capsule 3     levothyroxine (SYNTHROID/LEVOTHROID) 75 MCG tablet Take 50 mcg by mouth daily        LORAZEPAM PO Take 0.5 mg by mouth daily as needed        metFORMIN (GLUCOPHAGE) 500 MG tablet Take 500 mg by mouth daily (with breakfast)        Multiple Vitamin (DAILY MULTIVITAMIN PO) Take by mouth daily        nitroGLYcerin (NITROSTAT) 0.4 MG sublingual tablet Place 1 tablet (0.4 mg) under the tongue every 5 minutes as needed for chest pain 25 tablet 3     OMEPRAZOLE PO Take 20 mg by mouth       pravastatin (PRAVACHOL) 40 MG tablet Take 1 tablet (40 mg) by mouth At Bedtime 90 tablet 3     traZODone (DESYREL) 50 MG tablet Take 50 mg by mouth At Bedtime         ALLERGIES     Allergies   Allergen Reactions     Diovan [Valsartan] Other (See Comments) and GI Disturbance     Arthralgia     Hmg-Coa-R Inhibitors Other (See Comments)     Statin Medications give patient Myalgias---simvastatin     Sulfa Drugs Rash       PAST MEDICAL  HISTORY:  Past Medical History:   Diagnosis Date     CVA (cerebral vascular accident) (H) 02/2018    ANW-details unk, treated with t-pa (dizziness presenting sx)     Degenerative disk disease     C 6/7     Diabetes mellitus (H)      Diabetic neuropathy (H)      Essential hypertension, benign      Generalized osteoarthrosis, unspecified site (aka ARTHRITIS)     osteoporosis and osteoarthritis     Hyperlipidemia      Hypothyroid      Mitral valve disorder     mild/mod     Orthostasis      PAT (paroxysmal atrial tachycardia) (H)      Prinzmetal angina (H)     no cad 2000     PUD (peptic ulcer disease)      Rheumatic fever      TIA (transient ischemic attack)     2019 at [facility]-may have been orthostasis head MRI negative for stroke     Tricuspid regurgitation     mod     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       PAST SURGICAL HISTORY:  Past Surgical History:   Procedure Laterality Date     APPENDECTOMY       CORONARY ANGIOGRAPHY ADULT ORDER  2002    normal Coronary arteries, Vasospasms     HYSTERECTOMY      total     rotator cuff surgery      right     TONSILLECTOMY       ZZC RAD RESEC TONSIL/PILLARS         FAMILY HISTORY:  Family History   Problem Relation Age of Onset     Coronary Artery Disease Mother      Myocardial Infarction Mother      Heart Failure Father      Unknown/Adopted Maternal Grandmother      Unknown/Adopted Maternal Grandfather      Unknown/Adopted Paternal Grandmother      Unknown/Adopted Paternal Grandfather        SOCIAL HISTORY:  Social History     Socioeconomic History     Marital status:      Spouse name: None     Number of children: None     Years of education: None     Highest education level: None   Tobacco Use     Smoking status: Never Smoker     Smokeless tobacco: Never Used   Substance and Sexual Activity     Alcohol use: Yes     Comment: occ. wine   Other Topics Concern     Parent/sibling w/ CABG, MI or angioplasty before 65F 55M? No      "Caffeine Concern No     Comment: coffee: 3-4 cups a day     Sleep Concern No     Stress Concern No     Weight Concern No     Special Diet Yes     Comment: diabetic diet, healthy     Exercise Yes     Comment: walking x3 a week     Seat Belt Yes       Review of Systems:  Skin:        Eyes:       ENT:       Respiratory:       Cardiovascular:       Gastroenterology:      Genitourinary:       Musculoskeletal:       Neurologic:       Psychiatric:       Heme/Lymph/Imm:       Endocrine:         Physical Exam:  Vitals: Ht 1.562 m (5' 1.5\")   Wt 54 kg (119 lb)   BMI 22.12 kg/m      Constitutional:  cooperative, alert and oriented, well developed, well nourished, in no acute distress        Skin:  warm and dry to the touch, no apparent skin lesions or masses noted          Head:  normocephalic, no masses or lesions        Eyes:  pupils equal and round, conjunctivae and lids unremarkable, sclera white, no xanthalasma, EOMS intact, no nystagmus        Lymph:No Cervical lymphadenopathy present;No thyromegaly     ENT:           Neck:  carotid pulses are full and equal bilaterally, JVP normal, no carotid bruit        Respiratory:  normal breath sounds, clear to auscultation, normal A-P diameter, normal symmetry, normal respiratory excursion, no use of accessory muscles         Cardiac: regular rhythm, normal S1/S2, no S3 or S4, apical impulse not displaced, no murmurs, gallops or rubs                              2+             2+            GI:  abdomen soft, non-tender, BS normoactive, no mass, no HSM, no bruits        Extremities and Muscular Skeletal:  no deformities, clubbing, cyanosis, erythema observed;no edema              Neurological:  no gross motor deficits        Psych:    Anxious    Recent Lab Results:  LIPID RESULTS:  Lab Results   Component Value Date    CHOL 172 09/14/2020    HDL 75 09/14/2020    LDL 79 09/14/2020    TRIG 90 09/14/2020    CHOLHDLRATIO 3.1 01/12/2011       LIVER ENZYME RESULTS:  Lab Results "   Component Value Date    AST 23 04/22/2016    ALT 9 06/19/2019       CBC RESULTS:  Lab Results   Component Value Date    WBC 7.0 03/31/2015    RBC 3.97 03/31/2015    HGB 12.1 03/31/2015    HCT 36.7 03/31/2015    MCV 92 03/31/2015    MCH 30.5 03/31/2015    MCHC 33 03/31/2015    RDW 13.2 03/31/2015     03/31/2015       BMP RESULTS:  Lab Results   Component Value Date     (L) 09/14/2020    POTASSIUM 4.5 09/14/2020    CHLORIDE 102 09/14/2020    CO2 25 09/14/2020    ANIONGAP 12.5 09/14/2020     (H) 09/14/2020    BUN 21 09/14/2020    CR 1.15 09/14/2020    GFRESTIMATED 44 (L) 09/14/2020    GFRESTBLACK 54 (L) 09/14/2020    CHUN 10.1 09/14/2020        A1C RESULTS:  Lab Results   Component Value Date    A1C 6.9 (A) 04/22/2016       INR RESULTS:  No results found for: INR        CC  No referring provider defined for this encounter.

## 2022-06-10 NOTE — PROGRESS NOTES
Service Date: 06/10/2022    CARDIOLOGY OFFICE NOTE    OFFICE NOTE:  Ralph Whitten returns for followup.  She is a delightful, very pleasant young 89 year old.  She has documented coronary vasospasm, for which she takes diltiazem and p.r.n. nitroglycerin.  She has had a history of PAT in the past and a question of paroxysmal atrial fibrillation at one point.  About 2 years ago, she had a CVA at [facility] treated with TPA.  My understanding is they were not clear if it was actually a thrombotic stroke, embolic, etc.  She was placed on Eliquis along with aspirin every other day.  She was complaining about easy bruising, and so about a year and a half or 2 years ago, I stopped the aspirin and recommended continuing on Eliquis.  She was seen again in a year plus ago, and they thought she was having a TIA, but in fact, I actually think it might have been orthostasis.  She does note lightheadedness if she stands up quickly.  Today her blood pressure sitting was 116/66, standing 108/60.  I think that she does not need to be that well treated for hypertension, so I am going to stop the losartan, but I am going to continue the diltiazem because she is using it not only for blood pressure, but also for PAT and for coronary spasm.  I asked her to check her blood pressure at home and call us if she is much outside the range of 110-135 systolic.  She is actually more than a year past due for her office visit.  I do not have any new labs here, but through her primary care office she has diabetes, and apparently it needs to be a little fine tuned. Her hemoglobin A1c is over 8%.  Thyroid level was checked, and it was normal.  Liver panel was normal.  Basic metabolic panel was normal, including a normal creatinine of 1.07, but a slightly low GFR at 49. Her weight is 54 kg.  Her age is over 80, but her creatinine is less than 1.5, so I am going to decrease the Eliquis from 5-2.5 b.i.d.  I stopped the losartan, and I will ask  her to come back and see us in 1 year.  I do not plan doing a stress test or echoes for a number of years, and it looks like her primary care doctor's office has been doing an excellent job of following her for her general medicine.  I do note separately her , unfortunately, is still in the [facility] center and not doing well, and particularly unfortunately, her daughter apparently has metastatic ovarian cancer and not doing well, so Ralph is certainly under increased stress.    Today's visit was 32 minutes.    cc:  Chad Berry MD   [facility]  [address], [facility] [place] [address]    Nnamdi De Jesus MD        D: 06/10/2022   T: 06/10/2022   MT: [email or web address]    Name:     RALPH TIWARI  MRN:      [id number]        Account:      [id number]   :      10/16/1930           Service Date: 06/10/2022       Document: [id number]

## 2025-03-27 NOTE — MR AVS SNAPSHOT
After Visit Summary   8/4/2017    Ralph Whitten    MRN: [id number]           Patient Information     Date Of Birth          10/16/1930        Visit Information        Provider Department      8/4/2017 10:10 AM Gwyn Camara APRN CNP [facility] [place]        Today's Diagnoses     Prinzmetal's angina (H)    -  1    Benign essential hypertension          Care Instructions    Increase Losartan to 50mg daily    See me back in 4-6 weeks with nonfasting labs    Check a few blood pressure at home and bring those          Follow-ups after your visit        Additional Services     Follow-Up with Cardiac Advanced Practice Provider       4-6 weeks                  Future tests that were ordered for you today     Open Future Orders        Priority Expected Expires Ordered    Basic metabolic panel Routine 9/3/2017 8/4/2018 8/4/2017    Follow-Up with Cardiac Advanced Practice Provider Routine 9/3/2017 8/4/2018 8/4/2017            Who to contact     If you have questions or need follow up information about today's clinic visit or your schedule please contact [facility] [place] directly at [phone].  Normal or non-critical lab and imaging results will be communicated to you by MyChart, letter or phone within 4 business days after the clinic has received the results. If you do not hear from us within 7 days, please contact the clinic through MyChart or phone. If you have a critical or abnormal lab result, we will notify you by phone as soon as possible.  Submit refill requests through [employer] or call your pharmacy and they will forward the refill request to us. Please allow 3 business days for your refill to be completed.          Additional Information About Your Visit        Care EveryWhere ID     This is your Care EveryWhere ID. This could be used by other organizations to access your [facility] medical records  [id number]       Last appointment: 11/29/24  Next appointment: 5/30/25  Previous refill encounter(s): 11/13/24 #90 with 1 refill    Requested Prescriptions     Pending Prescriptions Disp Refills    pantoprazole (PROTONIX) 40 MG tablet 90 tablet 1     Sig: Take 1 tablet by mouth daily         For Pharmacy Admin Tracking Only    Program: Medication Refill  CPA in place:    Recommendation Provided To:   Intervention Detail: New Rx: 1, reason: Patient Preference  Intervention Accepted By:   Gap Closed?:    Time Spent (min): 5   "  Your Vitals Were     Pulse Height BMI (Body Mass Index)             64 1.562 m (5' 1.5\") 23.61 kg/m2          Blood Pressure from Last 3 Encounters:   08/04/17 137/60   05/31/17 121/61   05/24/17 115/62    Weight from Last 3 Encounters:   08/04/17 57.6 kg (127 lb)   05/31/17 57.6 kg (127 lb)   05/23/17 58 kg (127 lb 14.4 oz)              We Performed the Following     Follow-Up with Cardiac Advanced Practice Provider          Today's Medication Changes          These changes are accurate as of: 8/4/17 10:48 AM.  If you have any questions, ask your nurse or doctor.               These medicines have changed or have updated prescriptions.        Dose/Directions    * diltiazem 240 MG 24 hr capsule   This may have changed:  Another medication with the same name was changed. Make sure you understand how and when to take each.   Changed by:  Nnamdi De Jesus MD        Dose:  240 mg   Take 240 mg by mouth daily   Refills:  0       * diltiazem 60 MG tablet   Commonly known as:  CARDIZEM   This may have changed:  additional instructions   Used for:  Paroxysmal supraventricular tachycardia (H)   Changed by:  Nnamdi De Jesus MD        Dose:  60 mg   Take 1 tablet (60 mg) by mouth as needed Take 1 tablet only as needed for increased palpitatons   Quantity:  10 tablet   Refills:  2       losartan 50 MG tablet   Commonly known as:  COZAAR   This may have changed:    - medication strength  - how much to take   Used for:  Benign essential hypertension   Changed by:  Gwyn Camara, APRN CNP        Dose:  50 mg   Take 1 tablet (50 mg) by mouth daily   Quantity:  90 tablet   Refills:  3       * Notice:  This list has 2 medication(s) that are the same as other medications prescribed for you. Read the directions carefully, and ask your doctor or other care provider to review them with you.         Where to get your medicines      These medications were sent to [facility] [address] [place] [address] [address] " [facility]  [address] [facility] [address]     Phone:  [phone]     losartan 50 MG tablet                Primary Care Provider Office Phone # Fax #    Ramin Garvey [phone]       [facility] [place] [address] [facility] [address]  [place] [address]        Equal Access to Services     [facility]: Hadii aad ku hadasho Soomaali, waaxda luqadaha, qaybta kaalmada adeegyada, waxay idiin hayaan adeeg kharash la'aan . So [facility] [phone].    ATENCIÓN: Si habla español, tiene a singh disposición servicios gratuitos de asistencia lingüística. Suellen al [phone].    We comply with applicable federal civil rights laws and [place] laws. We do not discriminate on the basis of race, color, national origin, age, disability sex, sexual orientation or gender identity.            Thank you!     Thank you for choosing [facility] [place]  for your care. Our goal is always to provide you with excellent care. Hearing back from our patients is one way we can continue to improve our services. Please take a few minutes to complete the written survey that you may receive in the mail after your visit with us. Thank you!             Your Updated Medication List - Protect others around you: Learn how to safely use, store and throw away your medicines at www.disposemymeds.org.          This list is accurate as of: 8/4/17 10:48 AM.  Always use your most recent med list.                   Brand Name Dispense Instructions for use Diagnosis    aspirin 81 MG tablet      Take 81 mg by mouth daily        celeXA 10 MG tablet   Generic drug:  citalopram      Take 10 mg by mouth daily        DAILY MULTIVITAMIN PO      Take by mouth daily        * diltiazem 240 MG 24 hr capsule      Take 240 mg by mouth daily        * diltiazem 60 MG tablet    CARDIZEM    10 tablet    Take 1 tablet (60 mg) by mouth as needed Take 1 tablet only as needed for increased palpitatons    Paroxysmal  supraventricular tachycardia (H)       ESTRACE VAGINAL 0.1 MG/GM cream   Generic drug:  estradiol      Place 2 g vaginally once a week        LORAZEPAM PO      Take 0.5 mg by mouth daily as needed        losartan 50 MG tablet    COZAAR    90 tablet    Take 1 tablet (50 mg) by mouth daily    Benign essential hypertension       metFORMIN 500 MG tablet    GLUCOPHAGE     Take 500 mg by mouth 2 times daily (with meals)        metoprolol 25 MG 24 hr tablet    TOPROL-XL    1 tablet    Take 1 tablet (25 mg) by mouth At Bedtime for 1 dose    Unstable angina (H)       nitroGLYcerin 0.4 MG sublingual tablet    NITROSTAT     Place 0.4 mg under the tongue every 5 minutes as needed for chest pain For chest pain place 1 tablet under the tongue every 5 minutes for 3 doses. If symptoms persist 5 minutes after 1st dose call 911.        OMEPRAZOLE PO      Take 20 mg by mouth        pravastatin 40 MG tablet    PRAVACHOL    90 tablet    Take 1 tablet (40 mg) by mouth At Bedtime    Prinzmetal angina (H)       PREMARIN 0.3 MG tablet   Generic drug:  estrogens (conjugated)      Take 0.3 mg by mouth daily        PROBIOTIC ADVANCED Caps      Take by mouth daily        SYNTHROID 75 MCG tablet   Generic drug:  levothyroxine      Take 75 mcg by mouth daily        VITAMIN D3 PO      Take by mouth daily        * Notice:  This list has 2 medication(s) that are the same as other medications prescribed for you. Read the directions carefully, and ask your doctor or other care provider to review them with you.

## (undated) RX ORDER — NITROGLYCERIN 0.4 MG/1
TABLET SUBLINGUAL
Status: DISPENSED
Start: 2017-05-24